# Patient Record
Sex: FEMALE | Race: WHITE | Employment: OTHER | URBAN - METROPOLITAN AREA
[De-identification: names, ages, dates, MRNs, and addresses within clinical notes are randomized per-mention and may not be internally consistent; named-entity substitution may affect disease eponyms.]

---

## 2019-05-09 ENCOUNTER — HOSPITAL ENCOUNTER (OUTPATIENT)
Age: 84
Discharge: HOME | End: 2019-05-11
Payer: COMMERCIAL

## 2019-05-09 LAB
ALBUMIN SERPL-MCNC: 4.2 G/DL (ref 3.5–5.2)
ALP BLD-CCNC: 136 U/L (ref 35–104)
ALT SERPL-CCNC: 20 U/L (ref 0–32)
ANION GAP SERPL CALCULATED.3IONS-SCNC: 18 MMOL/L (ref 7–16)
AST SERPL-CCNC: 20 U/L (ref 0–31)
BACTERIA: ABNORMAL /HPF
BASOPHILS ABSOLUTE: 0.06 E9/L (ref 0–0.2)
BASOPHILS RELATIVE PERCENT: 0.7 % (ref 0–2)
BILIRUB SERPL-MCNC: 0.5 MG/DL (ref 0–1.2)
BILIRUBIN URINE: NEGATIVE
BLOOD, URINE: NEGATIVE
BUN BLDV-MCNC: 28 MG/DL (ref 8–23)
CALCIUM SERPL-MCNC: 10.3 MG/DL (ref 8.6–10.2)
CHLORIDE BLD-SCNC: 107 MMOL/L (ref 98–107)
CHOLESTEROL, TOTAL: 168 MG/DL (ref 0–199)
CLARITY: CLEAR
CO2: 18 MMOL/L (ref 22–29)
COLOR: YELLOW
CREAT SERPL-MCNC: 1.4 MG/DL (ref 0.5–1)
EOSINOPHILS ABSOLUTE: 0.29 E9/L (ref 0.05–0.5)
EOSINOPHILS RELATIVE PERCENT: 3.5 % (ref 0–6)
EPITHELIAL CELLS, UA: ABNORMAL /HPF
FOLATE: 19.9 NG/ML (ref 4.8–24.2)
GFR AFRICAN AMERICAN: 43
GFR NON-AFRICAN AMERICAN: 36 ML/MIN/1.73
GLUCOSE BLD-MCNC: 261 MG/DL (ref 74–99)
GLUCOSE URINE: NEGATIVE MG/DL
HBA1C MFR BLD: 8.2 % (ref 4–5.6)
HCT VFR BLD CALC: 34.2 % (ref 34–48)
HDLC SERPL-MCNC: 37 MG/DL
HEMOGLOBIN: 11.4 G/DL (ref 11.5–15.5)
IMMATURE GRANULOCYTES #: 0.06 E9/L
IMMATURE GRANULOCYTES %: 0.7 % (ref 0–5)
KETONES, URINE: NEGATIVE MG/DL
LDL CHOLESTEROL CALCULATED: 86 MG/DL (ref 0–99)
LEUKOCYTE ESTERASE, URINE: ABNORMAL
LYMPHOCYTES ABSOLUTE: 1.54 E9/L (ref 1.5–4)
LYMPHOCYTES RELATIVE PERCENT: 18.4 % (ref 20–42)
MCH RBC QN AUTO: 29.9 PG (ref 26–35)
MCHC RBC AUTO-ENTMCNC: 33.3 % (ref 32–34.5)
MCV RBC AUTO: 89.8 FL (ref 80–99.9)
MONOCYTES ABSOLUTE: 0.67 E9/L (ref 0.1–0.95)
MONOCYTES RELATIVE PERCENT: 8 % (ref 2–12)
NEUTROPHILS ABSOLUTE: 5.73 E9/L (ref 1.8–7.3)
NEUTROPHILS RELATIVE PERCENT: 68.7 % (ref 43–80)
NITRITE, URINE: NEGATIVE
PDW BLD-RTO: 14.2 FL (ref 11.5–15)
PH UA: 5 (ref 5–9)
PLATELET # BLD: 226 E9/L (ref 130–450)
PMV BLD AUTO: 9.9 FL (ref 7–12)
POTASSIUM SERPL-SCNC: 5 MMOL/L (ref 3.5–5)
PROTEIN UA: NEGATIVE MG/DL
RBC # BLD: 3.81 E12/L (ref 3.5–5.5)
RBC UA: ABNORMAL /HPF (ref 0–2)
SODIUM BLD-SCNC: 143 MMOL/L (ref 132–146)
SPECIFIC GRAVITY UA: 1.01 (ref 1–1.03)
TOTAL PROTEIN: 7.7 G/DL (ref 6.4–8.3)
TRIGL SERPL-MCNC: 223 MG/DL (ref 0–149)
TSH SERPL DL<=0.05 MIU/L-ACNC: 6.28 UIU/ML (ref 0.27–4.2)
UROBILINOGEN, URINE: 0.2 E.U./DL
VITAMIN B-12: 650 PG/ML (ref 211–946)
VITAMIN D 25-HYDROXY: 50 NG/ML (ref 30–100)
VLDLC SERPL CALC-MCNC: 45 MG/DL
WBC # BLD: 8.4 E9/L (ref 4.5–11.5)
WBC UA: ABNORMAL /HPF (ref 0–5)

## 2019-05-09 PROCEDURE — 80053 COMPREHEN METABOLIC PANEL: CPT

## 2019-05-09 PROCEDURE — 81001 URINALYSIS AUTO W/SCOPE: CPT

## 2019-05-09 PROCEDURE — 82607 VITAMIN B-12: CPT

## 2019-05-09 PROCEDURE — 84443 ASSAY THYROID STIM HORMONE: CPT

## 2019-05-09 PROCEDURE — 85025 COMPLETE CBC W/AUTO DIFF WBC: CPT

## 2019-05-09 PROCEDURE — 82306 VITAMIN D 25 HYDROXY: CPT

## 2019-05-09 PROCEDURE — 80061 LIPID PANEL: CPT

## 2019-05-09 PROCEDURE — 82746 ASSAY OF FOLIC ACID SERUM: CPT

## 2019-05-09 PROCEDURE — 83036 HEMOGLOBIN GLYCOSYLATED A1C: CPT

## 2020-08-23 ENCOUNTER — HOSPITAL ENCOUNTER (INPATIENT)
Age: 85
LOS: 4 days | Discharge: NURSING, REHAB OR LONG-TERM CARE | DRG: 552 | End: 2020-08-27
Attending: EMERGENCY MEDICINE | Admitting: SURGERY
Payer: COMMERCIAL

## 2020-08-23 ENCOUNTER — HOSPITAL ENCOUNTER (EMERGENCY)
Age: 85
Discharge: ANOTHER HOSPITAL | End: 2020-08-23
Attending: EMERGENCY MEDICINE
Payer: COMMERCIAL

## 2020-08-23 ENCOUNTER — APPOINTMENT (OUTPATIENT)
Dept: GENERAL RADIOLOGY | Age: 85
DRG: 552 | End: 2020-08-23
Payer: COMMERCIAL

## 2020-08-23 ENCOUNTER — APPOINTMENT (OUTPATIENT)
Dept: CT IMAGING | Age: 85
DRG: 552 | End: 2020-08-23
Payer: COMMERCIAL

## 2020-08-23 ENCOUNTER — APPOINTMENT (OUTPATIENT)
Dept: CT IMAGING | Age: 85
End: 2020-08-23
Payer: COMMERCIAL

## 2020-08-23 ENCOUNTER — APPOINTMENT (OUTPATIENT)
Dept: GENERAL RADIOLOGY | Age: 85
End: 2020-08-23
Payer: COMMERCIAL

## 2020-08-23 VITALS
SYSTOLIC BLOOD PRESSURE: 156 MMHG | WEIGHT: 150 LBS | TEMPERATURE: 98.8 F | RESPIRATION RATE: 18 BRPM | BODY MASS INDEX: 24.11 KG/M2 | DIASTOLIC BLOOD PRESSURE: 82 MMHG | HEIGHT: 66 IN | OXYGEN SATURATION: 96 % | HEART RATE: 69 BPM
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
## 2020-08-23 PROBLEM — K76.89 LIVER CYST: Status: ACTIVE | Noted: 2020-08-23

## 2020-08-23 PROBLEM — R91.1 PULMONARY NODULE, RIGHT: Status: ACTIVE | Noted: 2020-08-23

## 2020-08-23 PROBLEM — S22.43XA CLOSED FRACTURE OF MULTIPLE RIBS OF BOTH SIDES: Status: ACTIVE | Noted: 2020-08-23

## 2020-08-23 LAB
ANION GAP SERPL CALCULATED.3IONS-SCNC: 15 MMOL/L (ref 7–16)
BASOPHILS ABSOLUTE: 0.06 E9/L (ref 0–0.2)
BASOPHILS RELATIVE PERCENT: 0.6 % (ref 0–2)
BUN BLDV-MCNC: 31 MG/DL (ref 8–23)
CALCIUM SERPL-MCNC: 10.1 MG/DL (ref 8.6–10.2)
CHLORIDE BLD-SCNC: 93 MMOL/L (ref 98–107)
CO2: 20 MMOL/L (ref 22–29)
CREAT SERPL-MCNC: 1.4 MG/DL (ref 0.5–1)
EOSINOPHILS ABSOLUTE: 0.2 E9/L (ref 0.05–0.5)
EOSINOPHILS RELATIVE PERCENT: 1.9 % (ref 0–6)
GFR AFRICAN AMERICAN: 43
GFR NON-AFRICAN AMERICAN: 36 ML/MIN/1.73
GLUCOSE BLD-MCNC: 180 MG/DL (ref 74–99)
HCT VFR BLD CALC: 38.2 % (ref 34–48)
HEMOGLOBIN: 13 G/DL (ref 11.5–15.5)
IMMATURE GRANULOCYTES #: 0.11 E9/L
IMMATURE GRANULOCYTES %: 1 % (ref 0–5)
LACTIC ACID: 1.4 MMOL/L (ref 0.5–2.2)
LYMPHOCYTES ABSOLUTE: 0.9 E9/L (ref 1.5–4)
LYMPHOCYTES RELATIVE PERCENT: 8.4 % (ref 20–42)
MCH RBC QN AUTO: 29.4 PG (ref 26–35)
MCHC RBC AUTO-ENTMCNC: 34 % (ref 32–34.5)
MCV RBC AUTO: 86.4 FL (ref 80–99.9)
METER GLUCOSE: 196 MG/DL (ref 74–99)
MONOCYTES ABSOLUTE: 0.7 E9/L (ref 0.1–0.95)
MONOCYTES RELATIVE PERCENT: 6.5 % (ref 2–12)
NEUTROPHILS ABSOLUTE: 8.74 E9/L (ref 1.8–7.3)
NEUTROPHILS RELATIVE PERCENT: 81.6 % (ref 43–80)
PDW BLD-RTO: 14.3 FL (ref 11.5–15)
PLATELET # BLD: 221 E9/L (ref 130–450)
PMV BLD AUTO: 8.6 FL (ref 7–12)
POTASSIUM SERPL-SCNC: 4 MMOL/L (ref 3.5–5)
RBC # BLD: 4.42 E12/L (ref 3.5–5.5)
SODIUM BLD-SCNC: 128 MMOL/L (ref 132–146)
WBC # BLD: 10.7 E9/L (ref 4.5–11.5)

## 2020-08-23 PROCEDURE — 83605 ASSAY OF LACTIC ACID: CPT

## 2020-08-23 PROCEDURE — 6360000002 HC RX W HCPCS: Performed by: STUDENT IN AN ORGANIZED HEALTH CARE EDUCATION/TRAINING PROGRAM

## 2020-08-23 PROCEDURE — 2500000003 HC RX 250 WO HCPCS: Performed by: STUDENT IN AN ORGANIZED HEALTH CARE EDUCATION/TRAINING PROGRAM

## 2020-08-23 PROCEDURE — 72128 CT CHEST SPINE W/O DYE: CPT

## 2020-08-23 PROCEDURE — 70450 CT HEAD/BRAIN W/O DYE: CPT

## 2020-08-23 PROCEDURE — 99285 EMERGENCY DEPT VISIT HI MDM: CPT

## 2020-08-23 PROCEDURE — 71250 CT THORAX DX C-: CPT

## 2020-08-23 PROCEDURE — 6360000002 HC RX W HCPCS: Performed by: EMERGENCY MEDICINE

## 2020-08-23 PROCEDURE — 2060000000 HC ICU INTERMEDIATE R&B

## 2020-08-23 PROCEDURE — 6370000000 HC RX 637 (ALT 250 FOR IP): Performed by: EMERGENCY MEDICINE

## 2020-08-23 PROCEDURE — 99223 1ST HOSP IP/OBS HIGH 75: CPT | Performed by: SURGERY

## 2020-08-23 PROCEDURE — 73030 X-RAY EXAM OF SHOULDER: CPT

## 2020-08-23 PROCEDURE — 71045 X-RAY EXAM CHEST 1 VIEW: CPT

## 2020-08-23 PROCEDURE — 72170 X-RAY EXAM OF PELVIS: CPT

## 2020-08-23 PROCEDURE — 85025 COMPLETE CBC W/AUTO DIFF WBC: CPT

## 2020-08-23 PROCEDURE — 72125 CT NECK SPINE W/O DYE: CPT

## 2020-08-23 PROCEDURE — 6370000000 HC RX 637 (ALT 250 FOR IP): Performed by: STUDENT IN AN ORGANIZED HEALTH CARE EDUCATION/TRAINING PROGRAM

## 2020-08-23 PROCEDURE — 96376 TX/PRO/DX INJ SAME DRUG ADON: CPT

## 2020-08-23 PROCEDURE — 72131 CT LUMBAR SPINE W/O DYE: CPT

## 2020-08-23 PROCEDURE — 6370000000 HC RX 637 (ALT 250 FOR IP): Performed by: SURGERY

## 2020-08-23 PROCEDURE — 80048 BASIC METABOLIC PNL TOTAL CA: CPT

## 2020-08-23 PROCEDURE — 96374 THER/PROPH/DIAG INJ IV PUSH: CPT

## 2020-08-23 PROCEDURE — 82962 GLUCOSE BLOOD TEST: CPT

## 2020-08-23 PROCEDURE — 2580000003 HC RX 258: Performed by: STUDENT IN AN ORGANIZED HEALTH CARE EDUCATION/TRAINING PROGRAM

## 2020-08-23 RX ORDER — SODIUM CHLORIDE 0.9 % (FLUSH) 0.9 %
10 SYRINGE (ML) INJECTION EVERY 12 HOURS SCHEDULED
Status: DISCONTINUED | OUTPATIENT
Start: 2020-08-23 | End: 2020-08-27 | Stop reason: HOSPADM

## 2020-08-23 RX ORDER — OXYCODONE HYDROCHLORIDE 5 MG/1
5 TABLET ORAL EVERY 4 HOURS PRN
Status: DISCONTINUED | OUTPATIENT
Start: 2020-08-23 | End: 2020-08-27 | Stop reason: HOSPADM

## 2020-08-23 RX ORDER — SODIUM CHLORIDE, SODIUM LACTATE, POTASSIUM CHLORIDE, CALCIUM CHLORIDE 600; 310; 30; 20 MG/100ML; MG/100ML; MG/100ML; MG/100ML
INJECTION, SOLUTION INTRAVENOUS CONTINUOUS
Status: DISCONTINUED | OUTPATIENT
Start: 2020-08-23 | End: 2020-08-26

## 2020-08-23 RX ORDER — METHOCARBAMOL 500 MG/1
750 TABLET, FILM COATED ORAL ONCE
Status: COMPLETED | OUTPATIENT
Start: 2020-08-23 | End: 2020-08-23

## 2020-08-23 RX ORDER — METHOCARBAMOL 750 MG/1
750 TABLET, FILM COATED ORAL 3 TIMES DAILY
Status: DISCONTINUED | OUTPATIENT
Start: 2020-08-23 | End: 2020-08-27 | Stop reason: HOSPADM

## 2020-08-23 RX ORDER — SODIUM CHLORIDE 0.9 % (FLUSH) 0.9 %
10 SYRINGE (ML) INJECTION PRN
Status: DISCONTINUED | OUTPATIENT
Start: 2020-08-23 | End: 2020-08-27 | Stop reason: HOSPADM

## 2020-08-23 RX ORDER — ACETAMINOPHEN 325 MG/1
650 TABLET ORAL EVERY 4 HOURS
Status: DISCONTINUED | OUTPATIENT
Start: 2020-08-23 | End: 2020-08-23

## 2020-08-23 RX ORDER — OXYCODONE HYDROCHLORIDE AND ACETAMINOPHEN 5; 325 MG/1; MG/1
2 TABLET ORAL EVERY 4 HOURS PRN
Status: DISCONTINUED | OUTPATIENT
Start: 2020-08-23 | End: 2020-08-23

## 2020-08-23 RX ORDER — BISACODYL 10 MG
10 SUPPOSITORY, RECTAL RECTAL DAILY PRN
Status: DISCONTINUED | OUTPATIENT
Start: 2020-08-23 | End: 2020-08-27 | Stop reason: HOSPADM

## 2020-08-23 RX ORDER — OXYCODONE HYDROCHLORIDE AND ACETAMINOPHEN 5; 325 MG/1; MG/1
1 TABLET ORAL EVERY 4 HOURS PRN
Status: DISCONTINUED | OUTPATIENT
Start: 2020-08-23 | End: 2020-08-23

## 2020-08-23 RX ORDER — OXYBUTYNIN CHLORIDE 5 MG/1
5 TABLET ORAL NIGHTLY
Status: DISCONTINUED | OUTPATIENT
Start: 2020-08-23 | End: 2020-08-27 | Stop reason: HOSPADM

## 2020-08-23 RX ORDER — FENTANYL CITRATE 50 UG/ML
25 INJECTION, SOLUTION INTRAMUSCULAR; INTRAVENOUS ONCE
Status: COMPLETED | OUTPATIENT
Start: 2020-08-23 | End: 2020-08-23

## 2020-08-23 RX ORDER — ACETAMINOPHEN 325 MG/1
650 TABLET ORAL EVERY 4 HOURS PRN
Status: DISCONTINUED | OUTPATIENT
Start: 2020-08-23 | End: 2020-08-23

## 2020-08-23 RX ORDER — OXYCODONE HYDROCHLORIDE 5 MG/1
10 TABLET ORAL EVERY 4 HOURS PRN
Status: DISCONTINUED | OUTPATIENT
Start: 2020-08-23 | End: 2020-08-26

## 2020-08-23 RX ORDER — LIDOCAINE 4 G/G
2 PATCH TOPICAL DAILY
Status: DISCONTINUED | OUTPATIENT
Start: 2020-08-23 | End: 2020-08-27 | Stop reason: HOSPADM

## 2020-08-23 RX ORDER — INSULIN GLARGINE 100 [IU]/ML
20 INJECTION, SOLUTION SUBCUTANEOUS NIGHTLY
Status: DISCONTINUED | OUTPATIENT
Start: 2020-08-23 | End: 2020-08-27 | Stop reason: HOSPADM

## 2020-08-23 RX ORDER — HEPARIN SODIUM 10000 [USP'U]/ML
5000 INJECTION, SOLUTION INTRAVENOUS; SUBCUTANEOUS EVERY 8 HOURS SCHEDULED
Status: DISCONTINUED | OUTPATIENT
Start: 2020-08-23 | End: 2020-08-27 | Stop reason: HOSPADM

## 2020-08-23 RX ORDER — ACETAMINOPHEN 325 MG/1
650 TABLET ORAL EVERY 6 HOURS PRN
Status: DISCONTINUED | OUTPATIENT
Start: 2020-08-23 | End: 2020-08-27 | Stop reason: HOSPADM

## 2020-08-23 RX ORDER — METHOCARBAMOL 500 MG/1
1000 TABLET, FILM COATED ORAL 3 TIMES DAILY
Status: DISCONTINUED | OUTPATIENT
Start: 2020-08-23 | End: 2020-08-23

## 2020-08-23 RX ORDER — ACETAMINOPHEN 500 MG
1000 TABLET ORAL ONCE
Status: COMPLETED | OUTPATIENT
Start: 2020-08-23 | End: 2020-08-23

## 2020-08-23 RX ORDER — ASPIRIN 81 MG/1
81 TABLET, CHEWABLE ORAL DAILY
Status: DISCONTINUED | OUTPATIENT
Start: 2020-08-23 | End: 2020-08-27 | Stop reason: HOSPADM

## 2020-08-23 RX ORDER — SPIRONOLACTONE 25 MG/1
50 TABLET ORAL DAILY
Status: DISCONTINUED | OUTPATIENT
Start: 2020-08-23 | End: 2020-08-23

## 2020-08-23 RX ORDER — CITALOPRAM 20 MG/1
20 TABLET ORAL DAILY
Status: DISCONTINUED | OUTPATIENT
Start: 2020-08-23 | End: 2020-08-27 | Stop reason: HOSPADM

## 2020-08-23 RX ORDER — ACETAMINOPHEN 325 MG/1
650 TABLET ORAL EVERY 6 HOURS PRN
COMMUNITY

## 2020-08-23 RX ORDER — PROMETHAZINE HYDROCHLORIDE 25 MG/1
12.5 TABLET ORAL EVERY 6 HOURS PRN
Status: DISCONTINUED | OUTPATIENT
Start: 2020-08-23 | End: 2020-08-23

## 2020-08-23 RX ORDER — GLIMEPIRIDE 4 MG/1
8 TABLET ORAL
Status: DISCONTINUED | OUTPATIENT
Start: 2020-08-24 | End: 2020-08-23

## 2020-08-23 RX ORDER — ONDANSETRON 2 MG/ML
4 INJECTION INTRAMUSCULAR; INTRAVENOUS EVERY 6 HOURS PRN
Status: DISCONTINUED | OUTPATIENT
Start: 2020-08-23 | End: 2020-08-27 | Stop reason: HOSPADM

## 2020-08-23 RX ORDER — FENTANYL CITRATE 50 UG/ML
25 INJECTION, SOLUTION INTRAMUSCULAR; INTRAVENOUS
Status: DISCONTINUED | OUTPATIENT
Start: 2020-08-23 | End: 2020-08-23

## 2020-08-23 RX ORDER — SENNA AND DOCUSATE SODIUM 50; 8.6 MG/1; MG/1
1 TABLET, FILM COATED ORAL 2 TIMES DAILY
Status: DISCONTINUED | OUTPATIENT
Start: 2020-08-23 | End: 2020-08-27 | Stop reason: HOSPADM

## 2020-08-23 RX ORDER — FENTANYL CITRATE 50 UG/ML
50 INJECTION, SOLUTION INTRAMUSCULAR; INTRAVENOUS ONCE
Status: COMPLETED | OUTPATIENT
Start: 2020-08-23 | End: 2020-08-23

## 2020-08-23 RX ADMIN — CITALOPRAM 20 MG: 20 TABLET, FILM COATED ORAL at 20:59

## 2020-08-23 RX ADMIN — INSULIN LISPRO 1 UNITS: 100 INJECTION, SOLUTION INTRAVENOUS; SUBCUTANEOUS at 21:24

## 2020-08-23 RX ADMIN — METHOCARBAMOL TABLETS 750 MG: 750 TABLET, COATED ORAL at 20:59

## 2020-08-23 RX ADMIN — INSULIN GLARGINE 20 UNITS: 100 INJECTION, SOLUTION SUBCUTANEOUS at 21:24

## 2020-08-23 RX ADMIN — ASPIRIN 81 MG CHEWABLE TABLET 81 MG: 81 TABLET CHEWABLE at 20:58

## 2020-08-23 RX ADMIN — DOCUSATE SODIUM 50 MG AND SENNOSIDES 8.6 MG 1 TABLET: 8.6; 5 TABLET, FILM COATED ORAL at 20:58

## 2020-08-23 RX ADMIN — FENTANYL CITRATE 25 MCG: 50 INJECTION, SOLUTION INTRAMUSCULAR; INTRAVENOUS at 14:16

## 2020-08-23 RX ADMIN — Medication 10 ML: at 21:08

## 2020-08-23 RX ADMIN — FENTANYL CITRATE 50 MCG: 50 INJECTION, SOLUTION INTRAMUSCULAR; INTRAVENOUS at 11:24

## 2020-08-23 RX ADMIN — FAMOTIDINE 20 MG: 10 INJECTION, SOLUTION INTRAVENOUS at 20:58

## 2020-08-23 RX ADMIN — HEPARIN SODIUM 5000 UNITS: 10000 INJECTION INTRAVENOUS; SUBCUTANEOUS at 20:57

## 2020-08-23 RX ADMIN — OXYCODONE 5 MG: 5 TABLET ORAL at 21:24

## 2020-08-23 RX ADMIN — ACETAMINOPHEN 1000 MG: 500 TABLET, FILM COATED ORAL at 09:11

## 2020-08-23 RX ADMIN — OXYBUTYNIN CHLORIDE 5 MG: 5 TABLET ORAL at 20:59

## 2020-08-23 RX ADMIN — FENTANYL CITRATE 25 MCG: 50 INJECTION, SOLUTION INTRAMUSCULAR; INTRAVENOUS at 15:38

## 2020-08-23 RX ADMIN — SODIUM CHLORIDE, POTASSIUM CHLORIDE, SODIUM LACTATE AND CALCIUM CHLORIDE: 600; 310; 30; 20 INJECTION, SOLUTION INTRAVENOUS at 20:47

## 2020-08-23 RX ADMIN — METHOCARBAMOL TABLETS 750 MG: 500 TABLET, COATED ORAL at 15:38

## 2020-08-23 ASSESSMENT — PAIN SCALES - GENERAL
PAINLEVEL_OUTOF10: 8
PAINLEVEL_OUTOF10: 7
PAINLEVEL_OUTOF10: 7
PAINLEVEL_OUTOF10: 5
PAINLEVEL_OUTOF10: 5
PAINLEVEL_OUTOF10: 7

## 2020-08-23 ASSESSMENT — ENCOUNTER SYMPTOMS
CHEST TIGHTNESS: 0
NAUSEA: 0
SHORTNESS OF BREATH: 0
COLOR CHANGE: 0
EYE REDNESS: 0
COUGH: 0
FACIAL SWELLING: 0
EYE PAIN: 0
ABDOMINAL DISTENTION: 0
DIARRHEA: 0
RHINORRHEA: 0
PHOTOPHOBIA: 0
BACK PAIN: 1
CONSTIPATION: 0

## 2020-08-23 ASSESSMENT — PAIN DESCRIPTION - FREQUENCY
FREQUENCY: CONTINUOUS
FREQUENCY: CONTINUOUS

## 2020-08-23 ASSESSMENT — PAIN DESCRIPTION - LOCATION
LOCATION: NECK
LOCATION: RIB CAGE
LOCATION: SHOULDER
LOCATION: GENERALIZED

## 2020-08-23 ASSESSMENT — PAIN DESCRIPTION - ORIENTATION
ORIENTATION: RIGHT
ORIENTATION: RIGHT

## 2020-08-23 ASSESSMENT — PAIN SCALES - WONG BAKER
WONGBAKER_NUMERICALRESPONSE: 6
WONGBAKER_NUMERICALRESPONSE: 6

## 2020-08-23 ASSESSMENT — PAIN DESCRIPTION - DESCRIPTORS
DESCRIPTORS: ACHING
DESCRIPTORS: STABBING

## 2020-08-23 ASSESSMENT — PAIN DESCRIPTION - PAIN TYPE
TYPE: ACUTE PAIN

## 2020-08-23 NOTE — ED NOTES
Continue with cervical collar, alert, moves all ext. Ems here to transport patient.       Patricia Lott RN  08/23/20 [id number]

## 2020-08-23 NOTE — ED PROVIDER NOTES
Department of Emergency Medicine   ED  Provider Note  Admit Date/RoomTime: 8/23/2020  1:28 PM  ED Room: 22/22          History of Present Illness:  8/23/20, Time: 2:34 PM EDT  Chief Complaint   Patient presents with    Trauma     Transfer from [address] [place] [address]. Fall a couple days ago. C2 fx and multiple rib fx. Cristo Carlson is a 81 y.o. female presenting to the ED for trauma consultation, beginning several days. The complaint has been persistent, moderate in severity, and worsened by any movement and deep breathing. .  Patient presents as a trauma transfer. She was seen at [facility], she sustained a fall a few days ago, was seen initially at her [facility] discharged home. At [facility] she had CT scans which revealed a c2 fracture as well as multiple rib fractures. She was transferred here for trauma consultation. She states she still in pain but denies numbness tingling weakness. Denies nausea vomiting loss of consciousness. Review of Systems:   Pertinent positives and negatives are stated within HPI, all other systems reviewed and are negative.        --------------------------------------------- PAST HISTORY ---------------------------------------------  Past Medical History:  has a past medical history of CAD (coronary artery disease), COPD (chronic obstructive pulmonary disease) ([facility] Diabetes mellitus ([facility] Hyperlipidemia, and Hypertension. Past Surgical History:  has a past surgical history that includes Cardiac surgery; Hysterectomy; Coronary artery bypass graft (april 2013); Achilles tendon surgery (Left); Colonoscopy; eye surgery (Right); and other surgical history (Right, 7 7 14). Social History:  reports that she quit smoking about 7 years ago. She quit after 30.00 years of use. She has never used smokeless tobacco. She reports that she does not drink alcohol or use drugs. Family History: family history is not on file. . Unless otherwise noted, family history is non contributory    The patients home medications have been reviewed. Allergies: Naproxen; Nsaids; and Vicodin [hydrocodone-acetaminophen]        ---------------------------------------------------PHYSICAL EXAM--------------------------------------    Constitutional/General: Alert and oriented x3  Head: Normocephalic and atraumatic  Eyes: PERRL, EOMI, sclera non icteric  Mouth: Oropharynx clear, handling secretions, no trismus, no asymmetry of the posterior oropharynx or uvular edema  Neck: Immobilized in cervical collar  Respiratory: Diminished throughout,Not in respiratory distress  Cardiovascular:  Regular rate. Regular rhythm. 2+ distal pulses. Equal extremity pulses. Chest: There is right anterior as well as mild left chest wall pain. There is no crepitance or deformity  GI:  Abdomen Soft, Non tender, Non distended. No rebound, guarding, or rigidity. Musculoskeletal: Moves all extremities x 4. Warm and well perfused, no clubbing, cyanosis, or edema. Capillary refill <3 seconds  Integument: skin warm and dry. No rashes. Neurologic: GCS 15, no focal deficits, symmetric strength 5/5 in the upper and lower extremities bilaterally  Psychiatric: Normal Affect         -------------------------------------------------- RESULTS -------------------------------------------------  I have personally reviewed all laboratory and imaging results for this patient. Results are listed below.      LABS: (Lab results interpreted by me)  No results found for this visit on 08/23/20.,       RADIOLOGY:  Interpreted by Radiologist unless otherwise specified  No orders to display                    ------------------------- NURSING NOTES AND VITALS REVIEWED ---------------------------   The nursing notes within the ED encounter and vital signs as below have been reviewed by myself  /72   Pulse 67   Temp 98.8 °F (37.1 °C)   Resp 18   SpO2 95%     Oxygen Saturation Interpretation: Normal         The patients available past medical records and past encounters were reviewed. ------------------------------ ED COURSE/MEDICAL DECISION MAKING----------------------  Medications   fentaNYL (SUBLIMAZE) injection 25 mcg (25 mcg Intravenous Given 8/23/20 1416)                    Medical Decision Making:     IDr. Radha in the primary provider of record    Patient will be transitioned from EMS collar to [place] collar. SMI was ordered, pain medications ordered. Trauma service was notified arrival of the patient      Re-Evaluations:          Re-evaluation. Patients symptoms show no change  Repeat physical examination is not changed        This patient's ED course included: a personal history and physicial examination, re-evaluation prior to disposition, IV medications, cardiac monitoring, continuous pulse oximetry and complex medical decision making and emergency management    This patient has remained hemodynamically stable during their ED course. Consultations:  Trauma       Critical Care:         Counseling: The emergency provider has spoken with the patient and discussed todays results, in addition to providing specific details for the plan of care and counseling regarding the diagnosis and prognosis. Questions are answered at this time and they are agreeable with the plan.       --------------------------------- IMPRESSION AND DISPOSITION ---------------------------------    IMPRESSION  1. Closed nondisplaced fracture of second cervical vertebra, unspecified fracture morphology, initial encounter ([facility]    2. Closed fracture of multiple ribs of both sides, initial encounter        DISPOSITION  Disposition: Admit per consultation  Patient condition is stable        NOTE: This report was transcribed using voice recognition software.  Every effort was made to ensure accuracy; however, inadvertent computerized transcription errors may be present       Varun Amanda DO  08/23/20 1517

## 2020-08-23 NOTE — ED PROVIDER NOTES
up to 1.1 cm. Recommendations described below. 3. Multivessel coronary artery disease. Stable dilation of the ascending   thoracic aorta measuring up to 4.4 cm, previously 4.3 cm.   4. Hypodensity posterior to the cervical esophagus measuring up to 2.2 cm. Finding could represent a Zenker's diverticulum. 5. Large cyst centered within the right hepatic lobe now demonstrates   layering hyperdensity which could be related to proteinaceous versus   hemorrhagic content. RECOMMENDATIONS:   11 mm solid suspicious pulmonary nodule within the upper lobe. Consider a   non-contrast Chest CT at 3 months, a PET/CT, or tissue sampling. These guidelines do not apply to immunocompromised patients and patients with   cancer. Follow up in patients with significant comorbidities as clinically   warranted. For lung cancer screening, adhere to Lung-RADS guidelines. Reference: Radiology. 2017; 284(1):228-43. XR SHOULDER RIGHT (MIN 2 VIEWS)   Final Result   No acute fractures or dislocations of right shoulder. ------------------------- NURSING NOTES AND VITALS REVIEWED ---------------------------  Date / Time Roomed:  8/23/2020  8:22 AM  ED Bed Assignment:  JANE/JANE    The nursing notes within the ED encounter and vital signs as below have been reviewed. No data found. Oxygen Saturation Interpretation: Normal      ------------------------------------------ PROGRESS NOTES ------------------------------------------  Re-evaluation(s):  Time: 0930. Patients symptoms are worsening  Repeat physical examination is not changed    Time: 1030. Patients symptoms are improving  Repeat physical examination is improved    I have spoken with the patient and discussed todays results, in addition to providing specific details for the plan of care and counseling regarding the diagnosis and prognosis. Their questions are answered at this time and they are agreeable with the plan.   I have discussed the clinical information. I have reviewed my findings and recommendations with Jin Farfan and members of family present at the time of disposition. I, Dr. Silvio Tompkins am the primary physician of record for this patient. MDM: The patient is 81 y.o. female  with a past medical history of       Diagnosis Date    CAD (coronary artery disease)     COPD (chronic obstructive pulmonary disease) ([facility]     early stage    Diabetes mellitus ([facility]     Hyperlipidemia     Hypertension      presenting to the emergency department with a chief complaint of fall. The patient did have imaging which revealed cervical spine fracture as well as multiple rib fractures. Patient transferred to [facility] for trauma services. Critical care:  Critical Care: Please note that the withdrawal or failure to initiate urgent interventions for this patient would likely result in a life threatening deterioration or permanent disability. Accordingly this patient received 30 minutes of critical care time, excluding separately billable procedures. My findings/plan: The primary encounter diagnosis was Other closed nondisplaced fracture of second cervical vertebra, initial encounter ([facility] A diagnosis of Closed fracture of multiple ribs of right side, initial encounter was also pertinent to this visit.   Discharge Medication List as of 8/23/2020  1:09 PM        Clarence Meehan, [address], DO  08/24/20 220 Daniel Landeros, DO  09/13/20 [id number]

## 2020-08-23 NOTE — ED NOTES
Bed: 10  Expected date:   Expected time:   Means of arrival:   Comments:  ubaldo Regan RN  08/23/20 [id number]

## 2020-08-23 NOTE — H&P
TRAUMA HISTORY & PHYSICAL  Resident   8/23/2020  4:56 PM    Chief Complaint   Patient presents with    Trauma     Transfer from [address] [place] [address]. Fall a couple days ago. C2 fx and multiple rib fx. HPI: Fletcher Esquivel is a 81 y.o. female with a past medical history of COPD not on home oxygen, coronary artery disease, diabetes, hyper lipidemia, hypertension, and a past surgical history of a CABG x3 in 2013 and a laparoscopic hysterectomy with a 30-pack-year smoking history presents after a fall 2 days ago. She admits to hitting her head and has a large knot on the back of her head. Patient denies any blood thinners. Patient denies loss of consciousness. No nausea or vomiting, change in vision, changes in hearing, changes in motor function or sensory function since the incident. Main complaint is neck, chest wall, and back pain.       PRIMARY SURVEY    AIRWAY:   Airway normal  EMS ETT Absent   Noisy respirations Absent   Retractions: Absent   Vomiting/bleeding: Absent     BREATHING:    Midaxillary breath sound left:  Present  Midaxillary breath sound right: Present  Cough sound intensity:  Poor  Respiratory rate: 16  FiO2: RA   SpO2: 97  SMI: 1000    CIRCULATION:   Femerol pulse rate: within normal limits  Femerol pulse intensity: present  Palpebral conjunctiva: Pink     BP      P     SpO2       T      F    Patient Vitals for the past 8 hrs:   BP Temp Pulse Resp SpO2   08/23/20 1614 122/78 -- 67 16 97 %   08/23/20 1551 (!) 140/57 98.7 °F (37.1 °C) 72 18 97 %   08/23/20 1331 137/72 98.8 °F (37.1 °C) 67 18 95 %       FAST EXAM: Not performed    Central Nervous System    GCS Initial 15 minutes   Eye  Motor  Verbal 4 - Opens eyes on own  6 - Follows simple motor commands  5 - Alert and oriented 4 - Opens eyes on own  6 - Follows simple motor commands  5 - Alert and oriented     Neuromuscular blockade: No  Pupil size:  Left 3 mm    Right 3 mm  Pupil reaction: Yes  Wiggles fingers: Left Yes Right Yes  Wiggles toes: Substance Use Topics    Alcohol use: No       Past Surgical History:   has a past surgical history that includes Cardiac surgery; Hysterectomy; Coronary artery bypass graft (april 2013); Achilles tendon surgery (Left); Colonoscopy; eye surgery (Right); and other surgical history (Right, 7 7 14). NSAID use in last 72 hours: no  Taken PCN in past:  no  Last food/drink:   Last tetanus: unknown    Complaints:     Head: mild  Neck: moderate  Chest: moderate  Back: moderate  Abdomen: none  Extremities: mild  Comments:       SECONDARY SURVEY  Head/scalp: Large bruise on back of the right scalp    Face: No soft tissue injuries    Eyes/ears/nose: EOMI, PEERL, no soft tissue injuries    Pharynx/mouth:  No soft tissue injury, no malocclusion    Neck: No soft tissue injuries   Cervical spine tenderness: moderate  ROM:  Cervical collar in place    Chest wall:  CTAB, no crepitus appreciated, no soft tissue injuries     Heart:  RRR    Abdomen: Soft, non-distended, no soft tissue injuries   Tenderness:  none    Pelvis: Stable, no soft tissue injuries, no pain with hip flexion   Tenderness: none    Thoracolumbar spine: No soft tissue injuries, no step-offs  Tenderness:  moderate    Genitourinary:  no traumatic injuries    Rectum: no traumatic injuries    Perineum: no traumatic injuries    Extremities:   Sensory normal  Motor normal    Distal Pulses  Left arm Normal  Right arm Normal  Left leg Normal  Right leg Normal    Capillary refill   Left arm normal  Right arm normal  Left leg normal   Right leg normal      Procedures in ED:  None      Radiology:     Xr Pelvis (1-2 Views)    Result Date: 8/23/2020  Single view of the pelvis. There is diffuse bone demineralization. Degenerative spondylosis and facet arthropathy are identified in the lower lumbar spine. Osteophytosis and eburnation of sacroiliac joints and hips. Catheter overlies the lower pelvis. Prior internal fixation of proximal femur fracture bilaterally.  Otherwise the regional soft tissue and osseous structures are unremarkable. No acute fracture is identified. Osteopenia. Degenerative disc disease. Prior internal fixation of proximal femur fracture bilaterally. Xr Shoulder Right (min 2 Views)    Result Date: 8/23/2020  EXAMINATION: THREE XRAY VIEWS OF THE RIGHT SHOULDER 8/23/2020 9:26 am COMPARISON: None. HISTORY: ORDERING SYSTEM PROVIDED HISTORY: fall TECHNOLOGIST PROVIDED HISTORY: Reason for exam:->fall FINDINGS: Glenohumeral joint is normally aligned. No evidence of acute fracture or dislocation. No abnormal periarticular calcifications. Moderate osteoarthrosis the AC joint. Visualized lung is unremarkable. No acute fractures or dislocations of right shoulder. Ct Head Wo Contrast    Result Date: 8/23/2020  EXAMINATION: CT OF THE HEAD WITHOUT CONTRAST  8/23/2020 9:23 am TECHNIQUE: CT of the head was performed without the administration of intravenous contrast. Dose modulation, iterative reconstruction, and/or weight based adjustment of the mA/kV was utilized to reduce the radiation dose to as low as reasonably achievable. COMPARISON: CT head January 16, 2015 HISTORY: ORDERING SYSTEM PROVIDED HISTORY: fall TECHNOLOGIST PROVIDED HISTORY: Reason for exam:->fall Has a \"code stroke\" or \"stroke alert\" been called? ->No FINDINGS: BRAIN/VENTRICLES: There is no acute intracranial hemorrhage, mass effect or midline shift. No abnormal extra-axial fluid collection. The gray-white differentiation is maintained without evidence of an acute infarct. There is no evidence of hydrocephalus. ORBITS: The visualized portion of the orbits demonstrate no acute abnormality. SINUSES: Again seen is the right sphenoid sinus inspissated mucus. The visualized paranasal sinuses and mastoid air cells demonstrate no acute abnormality. SOFT TISSUES/SKULL:  No acute abnormality of the visualized skull or soft tissues. No acute intracranial abnormality.  Right sphenoid sinus is aerated mucous unchanged. Ct Chest Wo Contrast    Result Date: 8/23/2020  EXAMINATION: CT OF THE CHEST WITHOUT CONTRAST 8/23/2020 9:23 am TECHNIQUE: CT of the chest was performed without the administration of intravenous contrast. Multiplanar reformatted images are provided for review. Dose modulation, iterative reconstruction, and/or weight based adjustment of the mA/kV was utilized to reduce the radiation dose to as low as reasonably achievable. COMPARISON: CT chest performed January 16, 2015 HISTORY: ORDERING SYSTEM PROVIDED HISTORY: Fall TECHNOLOGIST PROVIDED HISTORY: Reason for exam:->Fall FINDINGS: Mediastinum: Multiple grossly stable thyroid gland nodules, not significantly changed from prior examination. Follow-up as clinically indicated. Hypodensity along the posterior left lateral aspect of the cervical esophagus measuring up to 2.2 cm. No significant mediastinal or hilar lymphadenopathy. Multivessel coronary artery disease. Grossly stable dilation of the ascending thoracic aorta measuring up to 4.4 cm in diameter, previously 4.3 cm. Diffuse atherosclerotic disease of the visualized aorta. Lungs/pleura: New spiculated nodule within the right upper lobe (axial image 20) measures 1.1 x 0.8 cm. Bibasilar atelectasis. No pleural effusion or pneumothorax. The central airways are grossly patent. Upper Abdomen: Large cyst centered within the right hepatic lobe now demonstrates some dependently layering hyperdensity which could represent proteinaceous versus hemorrhagic content. The adrenal glands are normal. Soft Tissues/Bones: Diffuse osteopenia. Multilevel degenerative changes of the visualized spine. Mildly displaced fractures of the right lateral 3rd, 4th, and 5th ribs. Mildly displaced fracture of the posterolateral left 9th rib. .     1. Mildly displaced right 3rd-5th rib and left 9th rib fractures. 2. The spiculated nodule in the right upper lobe measuring up to 1.1 cm.  Recommendations described below. 3. Multivessel coronary artery disease. Stable dilation of the ascending thoracic aorta measuring up to 4.4 cm, previously 4.3 cm. 4. Hypodensity posterior to the cervical esophagus measuring up to 2.2 cm. Finding could represent a Zenker's diverticulum. 5. Large cyst centered within the right hepatic lobe now demonstrates layering hyperdensity which could be related to proteinaceous versus hemorrhagic content. RECOMMENDATIONS: 11 mm solid suspicious pulmonary nodule within the upper lobe. Consider a non-contrast Chest CT at 3 months, a PET/CT, or tissue sampling. These guidelines do not apply to immunocompromised patients and patients with cancer. Follow up in patients with significant comorbidities as clinically warranted. For lung cancer screening, adhere to Lung-RADS guidelines. Reference: Radiology. 2017; 284(1):228-43. Ct Cervical Spine Wo Contrast    Result Date: 8/23/2020  EXAMINATION: CT OF THE CERVICAL SPINE WITHOUT CONTRAST 8/23/2020 9:23 am TECHNIQUE: CT of the cervical spine was performed without the administration of intravenous contrast. Multiplanar reformatted images are provided for review. Dose modulation, iterative reconstruction, and/or weight based adjustment of the mA/kV was utilized to reduce the radiation dose to as low as reasonably achievable. COMPARISON: None. HISTORY: ORDERING SYSTEM PROVIDED HISTORY: fall TECHNOLOGIST PROVIDED HISTORY: Reason for exam:->fall FINDINGS: BONES/ALIGNMENT: There is a nondisplaced fracture involving the left superior articular facet, and appears to extend into the inferior articular facet on the left. DEGENERATIVE CHANGES: Multilevel degenerative changes throughout the cervical spine. SOFT TISSUES: There is no prevertebral soft tissue swelling. There is a 9 mm spiculated lesion in the right lung apex. Nondisplaced fracture left articular facet of C2. No compression deformities.      Xr Chest Portable    Result Date: 8/23/2020  Clinical indications: Rib fractures. TECHNIQUE: Single frontal projection of the chest (1 view). COMPARISON: January 16, 2015. FINDINGS: Prior median sternotomy wires. There is calcification within thoracic aorta. Acromioclavicular arthropathy. The heart, lungs, mediastinum and regional skeleton are otherwise unremarkable. No evidence for acute cardiopulmonary process. Consultations: Neurosurgery consult    Admission/Diagnosis:   81 y.o. female s/p fall 2 days ago. Patient found to have C2 facet fracture and right 3 through 5, left ninth rib fractures. Trauma will admit. Further labs and scans ongoing. Code Status: Full    Plan of Treatment:      Follow-up labs and scans  Admit to trauma service  Neurosurgery consult    Plan discussed with Dr. Jean Alvarez.     Kiana Romero on 8/23/2020 at 4:56 PM

## 2020-08-24 LAB
ANION GAP SERPL CALCULATED.3IONS-SCNC: 15 MMOL/L (ref 7–16)
BASOPHILS ABSOLUTE: 0.04 E9/L (ref 0–0.2)
BASOPHILS RELATIVE PERCENT: 0.5 % (ref 0–2)
BUN BLDV-MCNC: 30 MG/DL (ref 8–23)
CALCIUM SERPL-MCNC: 10.2 MG/DL (ref 8.6–10.2)
CHLORIDE BLD-SCNC: 96 MMOL/L (ref 98–107)
CO2: 20 MMOL/L (ref 22–29)
CREAT SERPL-MCNC: 1.3 MG/DL (ref 0.5–1)
EOSINOPHILS ABSOLUTE: 0.19 E9/L (ref 0.05–0.5)
EOSINOPHILS RELATIVE PERCENT: 2.2 % (ref 0–6)
GFR AFRICAN AMERICAN: 47
GFR NON-AFRICAN AMERICAN: 39 ML/MIN/1.73
GLUCOSE BLD-MCNC: 185 MG/DL (ref 74–99)
HCT VFR BLD CALC: 38.7 % (ref 34–48)
HEMOGLOBIN: 13 G/DL (ref 11.5–15.5)
IMMATURE GRANULOCYTES #: 0.07 E9/L
IMMATURE GRANULOCYTES %: 0.8 % (ref 0–5)
LYMPHOCYTES ABSOLUTE: 1.42 E9/L (ref 1.5–4)
LYMPHOCYTES RELATIVE PERCENT: 16.2 % (ref 20–42)
MCH RBC QN AUTO: 29.4 PG (ref 26–35)
MCHC RBC AUTO-ENTMCNC: 33.6 % (ref 32–34.5)
MCV RBC AUTO: 87.6 FL (ref 80–99.9)
METER GLUCOSE: 186 MG/DL (ref 74–99)
METER GLUCOSE: 198 MG/DL (ref 74–99)
METER GLUCOSE: 199 MG/DL (ref 74–99)
METER GLUCOSE: 230 MG/DL (ref 74–99)
MONOCYTES ABSOLUTE: 0.71 E9/L (ref 0.1–0.95)
MONOCYTES RELATIVE PERCENT: 8.1 % (ref 2–12)
NEUTROPHILS ABSOLUTE: 6.34 E9/L (ref 1.8–7.3)
NEUTROPHILS RELATIVE PERCENT: 72.2 % (ref 43–80)
PDW BLD-RTO: 14.3 FL (ref 11.5–15)
PLATELET # BLD: 214 E9/L (ref 130–450)
PMV BLD AUTO: 8.8 FL (ref 7–12)
POTASSIUM REFLEX MAGNESIUM: 4.6 MMOL/L (ref 3.5–5)
RBC # BLD: 4.42 E12/L (ref 3.5–5.5)
SODIUM BLD-SCNC: 131 MMOL/L (ref 132–146)
WBC # BLD: 8.8 E9/L (ref 4.5–11.5)

## 2020-08-24 PROCEDURE — 2580000003 HC RX 258: Performed by: STUDENT IN AN ORGANIZED HEALTH CARE EDUCATION/TRAINING PROGRAM

## 2020-08-24 PROCEDURE — 85025 COMPLETE CBC W/AUTO DIFF WBC: CPT

## 2020-08-24 PROCEDURE — 97166 OT EVAL MOD COMPLEX 45 MIN: CPT

## 2020-08-24 PROCEDURE — 97162 PT EVAL MOD COMPLEX 30 MIN: CPT

## 2020-08-24 PROCEDURE — 6370000000 HC RX 637 (ALT 250 FOR IP): Performed by: STUDENT IN AN ORGANIZED HEALTH CARE EDUCATION/TRAINING PROGRAM

## 2020-08-24 PROCEDURE — 82962 GLUCOSE BLOOD TEST: CPT

## 2020-08-24 PROCEDURE — 97530 THERAPEUTIC ACTIVITIES: CPT

## 2020-08-24 PROCEDURE — 6370000000 HC RX 637 (ALT 250 FOR IP): Performed by: SURGERY

## 2020-08-24 PROCEDURE — 36415 COLL VENOUS BLD VENIPUNCTURE: CPT

## 2020-08-24 PROCEDURE — 80048 BASIC METABOLIC PNL TOTAL CA: CPT

## 2020-08-24 PROCEDURE — 99232 SBSQ HOSP IP/OBS MODERATE 35: CPT | Performed by: SURGERY

## 2020-08-24 PROCEDURE — 2060000000 HC ICU INTERMEDIATE R&B

## 2020-08-24 PROCEDURE — 6360000002 HC RX W HCPCS: Performed by: STUDENT IN AN ORGANIZED HEALTH CARE EDUCATION/TRAINING PROGRAM

## 2020-08-24 RX ADMIN — ONDANSETRON 4 MG: 2 INJECTION INTRAMUSCULAR; INTRAVENOUS at 02:02

## 2020-08-24 RX ADMIN — METHOCARBAMOL TABLETS 750 MG: 750 TABLET, COATED ORAL at 14:58

## 2020-08-24 RX ADMIN — ASPIRIN 81 MG CHEWABLE TABLET 81 MG: 81 TABLET CHEWABLE at 08:37

## 2020-08-24 RX ADMIN — INSULIN LISPRO 2 UNITS: 100 INJECTION, SOLUTION INTRAVENOUS; SUBCUTANEOUS at 08:38

## 2020-08-24 RX ADMIN — DOCUSATE SODIUM 50 MG AND SENNOSIDES 8.6 MG 1 TABLET: 8.6; 5 TABLET, FILM COATED ORAL at 08:36

## 2020-08-24 RX ADMIN — ONDANSETRON 4 MG: 2 INJECTION INTRAMUSCULAR; INTRAVENOUS at 15:08

## 2020-08-24 RX ADMIN — OXYBUTYNIN CHLORIDE 5 MG: 5 TABLET ORAL at 20:24

## 2020-08-24 RX ADMIN — SODIUM CHLORIDE, POTASSIUM CHLORIDE, SODIUM LACTATE AND CALCIUM CHLORIDE: 600; 310; 30; 20 INJECTION, SOLUTION INTRAVENOUS at 15:13

## 2020-08-24 RX ADMIN — INSULIN LISPRO 4 UNITS: 100 INJECTION, SOLUTION INTRAVENOUS; SUBCUTANEOUS at 17:39

## 2020-08-24 RX ADMIN — HEPARIN SODIUM 5000 UNITS: 10000 INJECTION INTRAVENOUS; SUBCUTANEOUS at 14:57

## 2020-08-24 RX ADMIN — OXYCODONE 5 MG: 5 TABLET ORAL at 04:43

## 2020-08-24 RX ADMIN — CITALOPRAM 20 MG: 20 TABLET, FILM COATED ORAL at 08:36

## 2020-08-24 RX ADMIN — METHOCARBAMOL TABLETS 750 MG: 750 TABLET, COATED ORAL at 08:36

## 2020-08-24 RX ADMIN — METFORMIN HYDROCHLORIDE 1000 MG: 1000 TABLET ORAL at 17:39

## 2020-08-24 RX ADMIN — INSULIN LISPRO 1 UNITS: 100 INJECTION, SOLUTION INTRAVENOUS; SUBCUTANEOUS at 20:24

## 2020-08-24 RX ADMIN — METFORMIN HYDROCHLORIDE 1000 MG: 1000 TABLET ORAL at 12:21

## 2020-08-24 RX ADMIN — OXYCODONE 10 MG: 5 TABLET ORAL at 10:56

## 2020-08-24 RX ADMIN — HEPARIN SODIUM 5000 UNITS: 10000 INJECTION INTRAVENOUS; SUBCUTANEOUS at 22:57

## 2020-08-24 RX ADMIN — INSULIN LISPRO 2 UNITS: 100 INJECTION, SOLUTION INTRAVENOUS; SUBCUTANEOUS at 12:21

## 2020-08-24 RX ADMIN — HEPARIN SODIUM 5000 UNITS: 10000 INJECTION INTRAVENOUS; SUBCUTANEOUS at 05:36

## 2020-08-24 RX ADMIN — METHOCARBAMOL TABLETS 750 MG: 750 TABLET, COATED ORAL at 20:24

## 2020-08-24 RX ADMIN — INSULIN GLARGINE 20 UNITS: 100 INJECTION, SOLUTION SUBCUTANEOUS at 20:24

## 2020-08-24 ASSESSMENT — PAIN DESCRIPTION - DESCRIPTORS
DESCRIPTORS: ACHING;DISCOMFORT;SORE
DESCRIPTORS: ACHING;CONSTANT;DISCOMFORT

## 2020-08-24 ASSESSMENT — PAIN SCALES - GENERAL
PAINLEVEL_OUTOF10: 2
PAINLEVEL_OUTOF10: 6
PAINLEVEL_OUTOF10: 0
PAINLEVEL_OUTOF10: 0
PAINLEVEL_OUTOF10: 7
PAINLEVEL_OUTOF10: 3
PAINLEVEL_OUTOF10: 0

## 2020-08-24 ASSESSMENT — PAIN DESCRIPTION - LOCATION
LOCATION: GENERALIZED
LOCATION: SHOULDER

## 2020-08-24 ASSESSMENT — PAIN DESCRIPTION - PAIN TYPE
TYPE: ACUTE PAIN

## 2020-08-24 ASSESSMENT — PAIN SCALES - WONG BAKER: WONGBAKER_NUMERICALRESPONSE: 0

## 2020-08-24 ASSESSMENT — PAIN DESCRIPTION - FREQUENCY: FREQUENCY: INTERMITTENT

## 2020-08-24 NOTE — PROGRESS NOTES
Physical Therapy  Physical Therapy Initial Assessment   Name: Zandra Mcburney  : 1935  MRN: [id number]    Referring Provider:  Kain Meraz MD    Date of Service: 2020    Evaluating PT:  Marwhit Netta, PT, DPT [id number]    Room #:  7121/2569-D  Diagnosis:  Fall from standing, initial encounter  PMHx/PSHx:  DM, CAD, COPD, HTN, HLD  Precautions:  Falls, cervical precautions for C2 fx, aspen collar  Equipment Needs:  TBD     SUBJECTIVE:    Pt lives at [facility]. Pt ambulated with rollator PTA. Pt reports she receives assistance with ADLs as needed. Pt reports 4 falls at [facility] recently. She reports multiple falls in her past as well. OBJECTIVE:   Initial Evaluation  Date: 2020 Treatment Short Term/ Long Term   Goals   AM-PAC 6 Clicks      Was pt agreeable to Eval/treatment? yes     Does pt have pain? 8/10 beck and shoulder pain     Bed Mobility  Rolling: Ana  Supine to sit: Ana  Sit to supine: NT  Scooting: Ana  Rolling: Independent  Supine to sit: Independent  Sit to supine: Independent  Scooting: Independent   Transfers Sit to stand: Ana  Stand to sit: Ana  Stand pivot: modA front [address]  Sit to stand: SBA  Stand to sit: SBA  Stand pivot: SBA front [address]   Ambulation    NT, pt declined  25 feet with front [address] [employer] negotiation: ascended and descended  NT  NA   ROM BUE:  Per OT note  BLE:  WNL except L ankle 5 degrees of DF with firm end feel     Strength BUE:  Per OT note  BLE:  WNL except L ankle 2+ df     Balance Sitting EOB:  SBA  Dynamic Standing:  modA front [address]  Sitting EOB:  Independent  Dynamic Standing:  Ana front [address]     Pt is A & O x 3 (self, time, situation, thought she was in [facility])  Sensation:  Pt denies numbness and tingling to extremities  Edema:  unremarkable    Patient education  Pt educated on role of PT intervention. Pt educated on safety in room with utilization of call light for assistance with mobility.   Pt educated on importance of aspen collar and all other cervical precautions. Lengthy discussion regarding need for further rehabilitation and assistance with walking in the future for safety and fall prevention. Patient response to education:   Pt verbalized understanding Pt demonstrated skill Pt requires further education in this area   yes yes yes     ASSESSMENT:    Comments:  RN cleared pt for activity prior to session. Pt received supine in bed and agreeable to PT intervention with OT collaboration at this time. Pt performed all functional mobility as noted above. Pt very fearful of falling and reporting many falls in her past with 4 happening within the past 2 months. Pt declined ambulation and states she does not want to walk anymore to avoid falling. Lengthy discussion regarding the importance of continued mobility with the appropriate level of assistance and safety precautions taken. At end of session, pt transferred to bedside chair and left with all needs met and call light in reach. Pt requires continued skilled PT intervention for the purposes of maximizing functional mobility and independence. Treatment:  Patient practiced and was instructed in the following treatment:     Therapeutic Activities Completed:  o Functional mobility as noted above:   - Bed mobility: Ana to complete. Max VC for log roll and cervical precautions. - Transfer training: Ana STS and modA stand pivot with front [employer]. Max VC for proper hand placement, sequencing, and front [employer] management.    o Skilled repositioning in seated position for comfort.  o Pt education as noted above. Pt's/ family goals   1. Not stated     Patient and or family understand(s) diagnosis, prognosis, and plan of care. yes    PLAN:    PT care will be provided in accordance with the objectives noted above. Exercises and functional mobility practice will be used as well as appropriate assistive devices or modalities to obtain goals.  Patient and family education will also be administered as needed. Frequency of treatments: 2-5x/week x 1-2 weeks. Time in  1145  Time out  1215    Total Treatment Time  25 minutes     Evaluation Time includes thorough review of current medical information, gathering information on past medical history/social history and prior level of function, completion of standardized testing/informal observation of tasks, assessment of data and education on plan of care and goals.     CPT codes:  [] Low Complexity PT evaluation 36045  [x] Moderate Complexity PT evaluation 58342  [] High Complexity PT evaluation 23093  [] PT Re-evaluation 93079  [] Gait training 39911 0 minutes  [] Manual therapy 73540 0 minutes  [x] Therapeutic activities 46286 25 minutes  [] Therapeutic exercises 40568 0 minutes  [] Neuromuscular reeducation 70112 0 minutes     Shelby Donato, PT, DPT  [id number]

## 2020-08-24 NOTE — PROGRESS NOTES
OCCUPATIONAL THERAPY INITIAL EVALUATION      Date:2020  Patient Name: Estrella Hutton  MRN: [id number]  : 1935  Room: [address] [place] [address]    Referring Provider: Duarte Cordero MD    Evaluating OT: Louie Pitts OTR/L #[id number]    AM-PAC Daily Activity Raw Score:     Recommended Adaptive Equipment: To be further assessed      Diagnosis:    1. Closed nondisplaced fracture of second cervical vertebra, unspecified fracture morphology, initial encounter ([address]    2. Closed fracture of multiple ribs of both sides, initial encounter          Pertinent Medical History: hx of L shoulder fx, COPD, CAD, DM,      Precautions:  Falls, cervical precautions for C2 fx, aspen collar     Home Living: Pt lives at an [facility]. Prior Level of Function: mostly indep  with ADLs, assist with baths.  , assist  with IADLs; using ww for ambulation. Pt reported she has a recent hx of multiple falls at [facility]    Pain Level: 8/10 back and shoulder pain   Cognition: A&O: 3/4; Follows 2 step directions   Memory:  fair    Sequencing:  fair    Problem solving:  fair    Judgement/safety:  fair      Functional Assessment:   Initial Eval Status  Date: 20 Treatment Status  Date: STG/LTG  Frequency/duration  2-3x/week, 5-7 days   Feeding Stand by Assist   Independent    Grooming Minimal Assist   Modified Divide    UB Dressing Moderate Assist   Supervision    LB Dressing Maximal Assist   Minimal Assist    Bathing Maximal Assist  Minimal Assist    Toileting Maximal Assist   Minimal Assist    Bed Mobility  Supine to sit: Minimal Assist   Sit to supine: NT   Supine to sit: Modified [place]ide   Sit to supine: Modified [place]    Functional Transfers Sit to stand: Minimal Assist   Stand to sit: Minimal Assist   Stand pivot: Moderate Assist with ww  Stand by Assist    Functional Mobility  (Ambulation) Mod A with ww 2-3 small side steps bed to chair  Pt reported fear of falling forward during mobility.    SBA with ww  Short functional distance   Balance Sitting:     Static:  SBA    Dynamic:min A  Standing: mod A     Activity Tolerance Poor for OOB activity  Good for OOB ADLs   Visual/  Perceptual Glasses: yes          BUE  ROM/Strength/  Fine motor Coordination RUE: ROM grossly WFL     Strength: grossly 3+/5      Strength:  WFL     Coordination: WFL    LUE: ROM minimal shoulder, partial elbow ROM     Strength: NT      Strength: fair     Coordination: poor+  Increase LUE AROM to partial ROM for improved indep with ADLs     Hand dominance: Left    Hearing: WFL  Sensation:  No c/o numbness or tingling  Tone:  WFL  Edema: None noted                          Treatment: OT treatment provided this date includes:    Mobility-  Instruction/training on safety and improved independence with bed mobility/functional transfers  and functional mobility.   Sitting EOB x 15 minutes to improve dynamic sitting balance and activity tolerance during ADLs.          Comments: Upon arrival, patient in bed. Pt education on cervical spinal precautions prior mobility. .  At end of session, patient up in chair, with call light and phone within reach, all lines and tubes intact. Pt demonstrating fair+ understanding of education/techniques. Patient would benefit from continued skilled OT  to improve safety, functional independence and quality of life.     Assessment of current deficits   Functional mobility [x]  ADLs [x] Strength [x]  Cognition []  Functional transfers  [x] IADLs [] Safety Awareness [x]  Endurance [x]  Fine Motor Coordination [x] Balance [x] Vision/perception [] Sensation []   Gross Motor Coordination [x] ROM [x] Delirium []                  Motor Control []    Plan of Care:   ADL retraining [x]    Equipment needs [x]   Neuromuscular re-education [x]  Energy Conservation Techniques [x]  Functional Transfer training [x]  Patient and/or Family Education [x]  Functional Mobility training [x]              Environmental Modifications [x]  Cognitive

## 2020-08-24 NOTE — PROGRESS NOTES
TRAUMA SURGERY  ATTENDING PROGRESS NOTE      CC: fall     S:   feeling ok, some neck pain,     O:   @/63   Pulse 72   Temp 97.5 °F (36.4 °C) (Temporal)   Resp 18   Ht 5' 6\" (1.676 m)   Wt 150 lb (68 kg)   SpO2 93%   BMI 24.21 kg/m² @    Gen - no apparent distress   Neuro - Awake, alert, attentive    HEENT - PERRL 3mm, posterior cephalohematoma   Lungs - non labored, BS clear + chest wall tenderness    Heart - RR   Abdomen - snt   Spine -   c spine tenderness   Ext- nvi rom wnl     A/P: 80 yo female s/p mechanical fall with C2 fx and b/l rib fx       Active Problems:    Fall from standing    Closed nondisplaced fracture of second cervical vertebra (HCC)    right 3rd-5th rib and left 9th rib fractures. Pulmonary nodule, right- Incidental     Liver cyst- Incidental   Resolved Problems:    * No resolved hospital problems. *      - C2 fx c collar, NS following, given low velocity mechanism elevated Cr, will forgo screening cta neck.    - Rib fx smi deep breathing pain control   - Home meds, DM SSI, restart metformin, carb cont diet    DVT prophylaxis: SCD, heparin tid     Radha Coffey MD FACS

## 2020-08-24 NOTE — DISCHARGE SUMMARY
Physician Discharge Summary     Patient ID:  Zaida Fang  [id number]  45 y.o.  1935    Admit date: 8/23/2020    Discharge date and time: 8/27/2020  3:01 PM     Admitting Physician: Duke Jeronimo MD     Admission Diagnoses: Fall from standing, initial encounter [W19. XXXA]    Discharge Diagnoses: Active Problems:    Fall from standing    Closed nondisplaced fracture of second cervical vertebra (HCC)    right 3rd-5th rib and left 9th rib fractures. Pulmonary nodule, right- Incidental     Liver cyst- Incidental   Resolved Problems:    * No resolved hospital problems. *      Admission Condition: fair    Discharged Condition: stable    Indication for Admission: Fall from standing height. C2 fracture. Bilateral rib fractures. Hospital Course/Procedures/Operation/treatments:     8/23: Fall from standing height. Found to have C2 fracture. CTA neck with held due to creatinine of 1.4 and low GFR of 28. Bilateral rib fractures SMI around 1000. Admitted to the general floor. 8/24: No new findings on tertiary exam.  [facility] around 850-1000. We will follow-up creatinine and see if she needs CTA this morning.  8/25: NAEO. Eating okay. Passing gas and stool. -600. MRI deferred d/t possible mechanical heart valve. Need to contact daughter about this. Plan is for NADER on DC. 13/24 with PT yesterday. 8/26: Remove rock, dc IV pain meds, obtain MRI  C spine  8/27: NAEO. Very sleeping this morning so not responding to questions. 16/24 with PT yesterday. Awaiting [facility] recs regarding results of cervical MRI. NADER on DC. Consults:   IP CONSULT TO TRAUMA SURGERY  IP CONSULT TO SOCIAL WORK  IP CONSULT TO NEUROSURGERY    Significant Diagnostic Studies:   Xr Pelvis (1-2 Views)    Result Date: 8/23/2020  Single view of the pelvis. There is diffuse bone demineralization. Degenerative spondylosis and facet arthropathy are identified in the lower lumbar spine.  Osteophytosis and eburnation of sacroiliac joints and hips. Catheter overlies the lower pelvis. Prior internal fixation of proximal femur fracture bilaterally. Otherwise the regional soft tissue and osseous structures are unremarkable. No acute fracture is identified. Osteopenia. Degenerative disc disease. Prior internal fixation of proximal femur fracture bilaterally. Xr Shoulder Right (min 2 Views)    Result Date: 8/23/2020  EXAMINATION: THREE XRAY VIEWS OF THE RIGHT SHOULDER 8/23/2020 9:26 am COMPARISON: None. HISTORY: ORDERING SYSTEM PROVIDED HISTORY: fall TECHNOLOGIST PROVIDED HISTORY: Reason for exam:->fall FINDINGS: Glenohumeral joint is normally aligned. No evidence of acute fracture or dislocation. No abnormal periarticular calcifications. Moderate osteoarthrosis the AC joint. Visualized lung is unremarkable. No acute fractures or dislocations of right shoulder. Ct Head Wo Contrast    Result Date: 8/23/2020  EXAMINATION: CT OF THE HEAD WITHOUT CONTRAST  8/23/2020 9:23 am TECHNIQUE: CT of the head was performed without the administration of intravenous contrast. Dose modulation, iterative reconstruction, and/or weight based adjustment of the mA/kV was utilized to reduce the radiation dose to as low as reasonably achievable. COMPARISON: CT head January 16, 2015 HISTORY: ORDERING SYSTEM PROVIDED HISTORY: fall TECHNOLOGIST PROVIDED HISTORY: Reason for exam:->fall Has a \"code stroke\" or \"stroke alert\" been called? ->No FINDINGS: BRAIN/VENTRICLES: There is no acute intracranial hemorrhage, mass effect or midline shift. No abnormal extra-axial fluid collection. The gray-white differentiation is maintained without evidence of an acute infarct. There is no evidence of hydrocephalus. ORBITS: The visualized portion of the orbits demonstrate no acute abnormality. SINUSES: Again seen is the right sphenoid sinus inspissated mucus. The visualized paranasal sinuses and mastoid air cells demonstrate no acute abnormality.  SOFT TISSUES/SKULL:  No acute abnormality of the visualized skull or soft tissues. No acute intracranial abnormality. Right sphenoid sinus is aerated mucous unchanged. Ct Chest Wo Contrast    Result Date: 8/23/2020  EXAMINATION: CT OF THE CHEST WITHOUT CONTRAST 8/23/2020 9:23 am TECHNIQUE: CT of the chest was performed without the administration of intravenous contrast. Multiplanar reformatted images are provided for review. Dose modulation, iterative reconstruction, and/or weight based adjustment of the mA/kV was utilized to reduce the radiation dose to as low as reasonably achievable. COMPARISON: CT chest performed January 16, 2015 HISTORY: ORDERING SYSTEM PROVIDED HISTORY: Fall TECHNOLOGIST PROVIDED HISTORY: Reason for exam:->Fall FINDINGS: Mediastinum: Multiple grossly stable thyroid gland nodules, not significantly changed from prior examination. Follow-up as clinically indicated. Hypodensity along the posterior left lateral aspect of the cervical esophagus measuring up to 2.2 cm. No significant mediastinal or hilar lymphadenopathy. Multivessel coronary artery disease. Grossly stable dilation of the ascending thoracic aorta measuring up to 4.4 cm in diameter, previously 4.3 cm. Diffuse atherosclerotic disease of the visualized aorta. Lungs/pleura: New spiculated nodule within the right upper lobe (axial image 20) measures 1.1 x 0.8 cm. Bibasilar atelectasis. No pleural effusion or pneumothorax. The central airways are grossly patent. Upper Abdomen: Large cyst centered within the right hepatic lobe now demonstrates some dependently layering hyperdensity which could represent proteinaceous versus hemorrhagic content. The adrenal glands are normal. Soft Tissues/Bones: Diffuse osteopenia. Multilevel degenerative changes of the visualized spine. Mildly displaced fractures of the right lateral 3rd, 4th, and 5th ribs. Mildly displaced fracture of the posterolateral left 9th rib. .     1. Mildly displaced right 3rd-5th rib fracture left articular facet of C2. No compression deformities. Ct Thoracic Spine Wo Contrast    Result Date: 2020  Patient Mrn: [id number] : 1935 Age:  81 years Gender: Female Order Date: 2020 6:20 PM Exam: CT THORACIC SPINE WO CONTRAST NUMBER OF IMAGES:  908 INDICATION: Trauma, advanced age COMPARISON: Thoracic CT images 529 TECHNIQUE: Helical images were extended through the thoracic spine without contrast, and reformatted at bone window and soft tissue window settings in all 3 imaging planes. Low-dose CT  acquisition technique included one of following options; 1 . Automated exposure control, 2. Adjustment of MA and or KV according to patient's size or 3. Use of iterative reconstruction. Findings: Thoracic vertebrae show diffuse demineralization, and flowing ventral osteophytes suggests a spectrum of DISH-type change. There is a kyphotic curvature with the apex at about the T7 level. The central spinal canal and foramina appear patent, and the posterior elements appear intact throughout. Coronal images show no lateral deviation of the thoracic spine. Axial images show atherosclerotic vertebral arterial calcifications, but the medial ribs and thoracic vertebrae appear intact throughout. Standard CT imaging included more the anterior and lateral chest wall, and reported fractures of the right third through fifth and left ninth ribs. There is ectasia and intimal calcification of the thoracic aorta, and coronary artery calcifications are present. Lung window images show a posteromedial right pulmonary apical irregularly marginated nodular density of 13 mm diameter which is not further characterized on this study. It could be evaluated by PET CT imaging, short-term interval 3 month chest CT follow-up, or less desirable in this age range, percutaneous biopsy. There are small pleural effusions with basilar atelectasis.      There is no evidence of chest wall trauma, particularly, no evidence of trauma involving the thoracic spine. Bilateral rib fractures documented on standard CT imaging were not included in the spinal images. There is a 13 mm diameter mass in the posterior medial right pulmonary apical region, which could be further characterized with PET CT imaging if it has not been previously evaluated. 3 month CT chest follow-up evaluation may document stability in this patient of advanced age, it PET CT imaging is not feasible. Dependent atelectasis and pleural thickening in both lungs is noted. Ct Lumbar Spine Wo Contrast    Result Date: 8/23/2020  This examination and all examinations utilizing ionizing radiation at this facility are done so according to the ALARA (as low as reasonably achievable) principal for radiation dose reduction. COMPARISON: None. TECHNIQUE: Axial, sagittal, and coronal computed tomography of the lumbar spine was performed without contrast. FINDINGS: There is no evidence of acute displaced cortical disruption or spondylolisthesis. The vertebral bodies and disks are normal in vertical dimension and alignment is maintained. There is diffuse degenerative spondylosis and facet arthropathy. Circumferential disc bulge, ligamentum flavum hypertrophy and degenerative facet arthropathy result in central spinal canal stenosis at L2-3, L3-4 and L4-5. Posterior lateral disc and osteophyte complex and degenerative facet arthropathy result in foraminal stenosis on the right at L3-4 and L4-5, on the left at L4-5 and L5-S1. Calcification and ectasia of the abdominal aorta and its branches. Fusiform aneurysmal dilation of the abdominal aorta measures 3.6 cm in the infrarenal distribution. Otherwise the regional soft tissue and osseous structures are unremarkable. No acute fracture or spondylolisthesis is identified on CT of lumbar spine. Diffuse degenerative disc and facet disease result in multilevel central and foraminal stenosis.  3.6 cm infrarenal abdominal aortic aneurysm. Xr Chest Portable    Result Date: 8/23/2020  Clinical indications: Rib fractures. TECHNIQUE: Single frontal projection of the chest (1 view). COMPARISON: January 16, 2015. FINDINGS: Prior median sternotomy wires. There is calcification within thoracic aorta. Acromioclavicular arthropathy. The heart, lungs, mediastinum and regional skeleton are otherwise unremarkable. No evidence for acute cardiopulmonary process. Discharge Exam:  TRAUMA SURGERY  ATTENDING PROGRESS NOTE        CC: fall      S:  No issues pain is controlled      O:          @/66   Pulse 75   Temp 97.6 °F (36.4 °C) (Temporal)   Resp 16   Ht 5' 6\" (1.676 m)   Wt 150 lb (68 kg)   SpO2 94%   BMI 24.21 kg/m² @     Gen - no apparent distress   Neuro - Awake, alert, attentive    HEENT - PERRL 3mm,   Lungs - non labored, BS clear + chest wall tenderness    Heart - RR   Abdomen - snt   Spine -   c spine tenderness mild/moderate    Ext- nvi rom wnl      A/P: 82 yo female s/p mechanical fall with C2 fx and b/l rib fx        Active Problems:    Fall from standing    Closed nondisplaced fracture of second cervical vertebra (HCC)    right 3rd-5th rib and left 9th rib fractures. Pulmonary nodule, right- Incidental     Liver cyst- Incidental   Resolved Problems:    * No resolved hospital problems. *       - C2 fx c collar, NS following, given low velocity mechanism elevated Cr, will forgo screening cta neck. Soft collar in bed and aspen when out of bed per NS    - Rib fx smi deep breathing pain control   - Home meds, DM SSI,metformin, carb cont diet  - PTOT placement      DVT prophylaxis: SCD, heparin tid      Abimael Kraus MD FACS                      Disposition: SNF    In process/preliminary results:  Outstanding Order Results     No orders found from 7/25/2020 to 8/24/2020.           Patient Instructions:   Discharge Medication List as of 8/27/2020 12:10 PM           Details   aspirin 81 MG chewable tablet Take 1 tablet by care provider. Follow up with Dr. Karan Luis as directed. ACTIVITY INSTRUCTIONS  Increase activity as tolerated  No heavy lifting or strenuous activity  Take your incentive spirometer home and use 4-6 times/day      WOUND/DRESSING INSTRUCTIONS:  You may shower. No sitting in bath tub, hot tub or swimming until cleared by physician. Ice to areas of pain for first 24 hours. Heat to areas of pain after that. Wash areas of lacerations/abrasions with soap & water. Rinse well. Pat dry with clean towel. Apply thin layer of Bacitracin, Neosporin, or triple antibiotic cream to affected area 2-3 times per day. Keep wounds clean and dry. MEDICATION INSTRUCTIONS  Take medication as prescribed. When taking pain medications, you may experience dizziness or drowsiness. Do not drink alcohol or drive when taking these medications. You may experience constipation while taking pain medication. You may take over the counter stool softeners such as docusate (Colace), sennosides S (Senokot-S), or Miralax. OPIOID MEDICATION INSTRUCTIONS  Read the medication guide that is included with your prescription. Take your medication exactly as prescribed. Store medication away from children and in a safe place. Do NOT share your medication with others. Do NOT take medication unless it is prescribed for you. Do NOT drink alcohol while taking opioids (I.e., Norco, Percocet, Oxycodone, etc). Discuss with the Trauma Clinic staff if the dose of medication you are taking does not control your pain and any side effects that you may be having. CALL 911 OR YOUR LOCAL EMERGENCY SERVICE:  --If you take too much medication  --If you have trouble breathing or shortness of breath  --A child has taken this medication. WORK:  You may not return to work until you receive follow-up with the Trauma Clinic or clearance by all consultants.     Call the trauma clinic for any of the following or for questions/concerns;  --fever over 101F  --redness, swelling, hardness or warmth at the wound site(s). --Unrelieved nausea/vomiting  --Foul smelling or cloudy drainage at the wound site(s)  --Unrelieved pain or increase in pain  --Increase in shortness of breath    Follow-up:  Trauma Clinic: [phone] option 2  [address]    Your ribs are curved bones in your chest that protect inner structures like your lungs and heart. The ribs expand and contract when you breathe. Pain can result making it hard to cough or breathe deeply. The difficulty increases if several ribs are broken on both sides. You are likely to heal in 6 to 8 weeks, but may take longer if you are elderly. Most rib fractures heal on their own with no lasting effects. Treatment:    Take the medications given to you as prescribed. Your pain may not go completely away after discharge from the hospital, but we want your pain tolerable so you can take deep breaths.  As your pain becomes controlled you may switch to taking over-the-counter medications such as Tylenol and or Ibuprofen as directed. o Tylenol (acetaminophen) 1000mg every 6 hours or you may take 650mg every 4 hours. o Ibuprofen up to 800mg every 8 hours. (Please take with food or milk).  Over the counter creams and patches such as Salon Pas, [employer], Aspercream, can be useful as well.  You were likely given a breathing device called an incentive spirometer while in the hospital to practice deep breathing. It is advised to continue this several times a day while at home to prevent pneumonia. Prevent Complications:  Try to take 5-10 deep breaths every hour while awake. Use the incentive spirometer often. Set goals of deeper breathing every couple of days until reaching normal adult level of about 2500 ml on the printed scale. Activity:  Avoid further chest injury.   Plan quiet activity for the first few days. Do not stay in bed. Walk around and move. No rough activities with family, friends or pets. No sports, jumping, jogging or gymnastics. Ask your doctor or the trauma clinic when you will be able to resume these activities. Symptoms to Report:  Call your doctor right away if you notice any of these symptoms:    Increased chest pain   Shortness of breath   Fever   Coughing up blood    Call 911 immediately if these symptoms are severe.     Follow-up:  Trauma Clinic: [phone] option 2  [address] davis, [address] [place]       Follow up:   [address]  [address] [id number]  [address], [address] [place] [address] [address] [place] [address] [id number]    In 2 weeks  For follow up    [address]  [address] [place] [address] Bernard Oneydaaré [id number]  Call in 1 week  1 week after discharged home       Signed:  Marissa Schafer MD  8/28/2020  8:22 PM

## 2020-08-24 NOTE — PROGRESS NOTES
TRAUMA TERTIARY    Admit Date: 8/23/2020    [facility]    CC:    Chief Complaint   Patient presents with   [facility] Trauma     Transfer from [facility]. Fall a couple days ago. C2 fx and multiple rib fx. Alcohol pre-screening:  How many times in the past year have you had 4-5 or more drinks in a day?  none    Subjective: Fall from standing. Neck pain and shoulder pain. No new findings. . Objective:     Patient Vitals for the past 8 hrs:   BP Temp Temp src Pulse Resp SpO2   08/23/20 2300 121/64 97.8 °F (36.6 °C) Temporal 70 16 94 %       I/O last 3 completed shifts:  In: -   Out: 400 [Urine:400]  I/O this shift:  In: 650 [P.O.:120; I.V.:530]  Out: 250 [Urine:250]    Radiology:  CT THORACIC SPINE WO CONTRAST   Final Result   There is no evidence of chest wall trauma, particularly, no evidence   of trauma involving the thoracic spine. Bilateral rib fractures   documented on standard CT imaging were not included in the spinal   images. There is a 13 mm diameter mass in the posterior medial right pulmonary   apical region, which could be further characterized with PET CT   imaging if it has not been previously evaluated. 3 month CT chest   follow-up evaluation may document stability in this patient of   advanced age, it PET CT imaging is not feasible. Dependent atelectasis   and pleural thickening in both lungs is noted. CT LUMBAR SPINE WO CONTRAST   Final Result      No acute fracture or spondylolisthesis is identified on CT of lumbar   spine. Diffuse degenerative disc and facet disease result in multilevel   central and foraminal stenosis. 3.6 cm infrarenal abdominal aortic aneurysm. XR PELVIS (1-2 VIEWS)   Final Result      No acute fracture is identified. Osteopenia. Degenerative disc disease. Prior internal fixation of proximal femur fracture bilaterally. XR CHEST PORTABLE   Final Result      No evidence for acute cardiopulmonary process. MRI CERVICAL SPINE WO CONTRAST    (Results Pending)       PHYSICAL EXAM:   GCS:  4 - Opens eyes on own   6 - Follows simple motor commands  5 - Alert and oriented    Pupil size: Left 4 mm     Right 4 mm  Pupil reaction: Yes  Wiggles fingers: Left Yes     Right Yes  Wiggles toes: Left Yes     Right Yes  Plantar flexion: Left normal     Right normal    GENERAL:  NAD. A&Ox3. HEAD:  Normocephalic, bruise posterior right scalp  LUNGS:  No increased work of breathing. CTAB. CARDIOVASCULAR: RR  ABDOMEN:  Soft, non-distended, non-tender. No guarding, rigidity, rebound. EXTREMITIES:  MAEx4. No LE edema. Atraumatic. SKIN:  Warm and dry      Spine:       Spine Tenderness ROM   Cervical 5 /10 Normal   Thoracic 0 /10 Normal   Lumbar 0 /10 Normal     Musculoskeletal:    Joint Tenderness Swelling/Deformity ROM   Right shoulder absent absent normal   Left shoulder absent absent normal   Right elbow absent absent normal   Left elbow absent absent normal   Right wrist absent absent normal   Left wrist absent absent normal   Right hand grasp absent absent normal   Left hand grasp absent absent normal   Right hip absent absent normal   Left hip absent absent normal   Right knee absent absent normal   Left knee absent absent normal   Right ankle absent absent normal   Left ankle absent absent normal   Right foot absent absent normal   Left foot absent absent normal         CONSULTS: Neurosurgery      Active Problems:    Fall from standing    Closed nondisplaced fracture of second cervical vertebra (HCC)    right 3rd-5th rib and left 9th rib fractures. Pulmonary nodule, right- Incidental     Liver cyst- Incidental   Resolved Problems:    * No resolved hospital problems. *        Assessment/Plan:     Neuro: C2 fracture. Will consider CTA neck if creatinine better this morning. Tommas Baptise GCS 15. Pain control. CV: No acute issues. Pulm: Bilateral rib fractures. Encourage IS/SMI. GI: No acute issues. Bowel regimen. Zofran PRN.    Renal: No acute issues. Endocrine: No acute issues. MSK: No acute issues. PT/OT. Heme: No acute issues. ID: No acute issues.     Pain/Analgesia: Percocet/Dilaudid/Robaxin  Bowel Regimen: Senokot/MiraLAX  DVT PPx:  SCDs, heparin 5000  GI PPx:  Famotidine     Code status:  Full Code    Disposition: Continue care monitor floor    Ruddy Briones MD  Resident, PGY-2  8/24/2020  6:19 AM

## 2020-08-24 NOTE — PLAN OF CARE
Problem: Skin Integrity:  Goal: Will show no infection signs and symptoms  Description: Will show no infection signs and symptoms  Outcome: Met This Shift  Goal: Absence of new skin breakdown  Description: Absence of new skin breakdown  Outcome: Met This Shift     Problem: Falls - Risk of:  Goal: Will remain free from falls  Description: Will remain free from falls  Outcome: Met This Shift

## 2020-08-24 NOTE — CARE COORDINATION
Attempted to meet with the patient at the bedside. Patient currently receiving nursing care and is reported to be confused at times. Call placed to the patient's daughter Juan Carlos Powell to discuss transition of care planning. Patient does not have a POA. Patient has had multiple falls at her [address], at least 4 this month. Patient had been ambulating with a walker PTA. Patient has a history of inpatient rehab [facility] in [place]. Per Juan Carlos Powell, either facility is fine for the patient to transition to depending on what is appropriate. Spoke with Therapy. Patient is appropriate for NADER at discharge. Call placed to Paige, liaison with [facility]. They can accept the patient and she can transition to their facility [facility] under [address] if scores are appropriate. NO COVID test needed as long as the patient has no s/s. CXR negative. CT chest shows new pulmonary nodule, f/u in 3 months for repeat scans. Await MRI of the cervical spine and further input from Neurosurgery. HENS completed and envelope and transfer paperwork in the soft chart. Will continue to follow.      Karen Lopes.  P:  [phone]

## 2020-08-25 LAB
ANION GAP SERPL CALCULATED.3IONS-SCNC: 13 MMOL/L (ref 7–16)
BASOPHILS ABSOLUTE: 0.04 E9/L (ref 0–0.2)
BASOPHILS RELATIVE PERCENT: 0.4 % (ref 0–2)
BUN BLDV-MCNC: 29 MG/DL (ref 8–23)
CALCIUM SERPL-MCNC: 10.1 MG/DL (ref 8.6–10.2)
CHLORIDE BLD-SCNC: 96 MMOL/L (ref 98–107)
CO2: 23 MMOL/L (ref 22–29)
CREAT SERPL-MCNC: 1.3 MG/DL (ref 0.5–1)
EOSINOPHILS ABSOLUTE: 0.22 E9/L (ref 0.05–0.5)
EOSINOPHILS RELATIVE PERCENT: 2 % (ref 0–6)
GFR AFRICAN AMERICAN: 47
GFR NON-AFRICAN AMERICAN: 39 ML/MIN/1.73
GLUCOSE BLD-MCNC: 98 MG/DL (ref 74–99)
HCT VFR BLD CALC: 35.8 % (ref 34–48)
HEMOGLOBIN: 12.1 G/DL (ref 11.5–15.5)
IMMATURE GRANULOCYTES #: 0.08 E9/L
IMMATURE GRANULOCYTES %: 0.7 % (ref 0–5)
LYMPHOCYTES ABSOLUTE: 1.32 E9/L (ref 1.5–4)
LYMPHOCYTES RELATIVE PERCENT: 12.2 % (ref 20–42)
MCH RBC QN AUTO: 29.7 PG (ref 26–35)
MCHC RBC AUTO-ENTMCNC: 33.8 % (ref 32–34.5)
MCV RBC AUTO: 88 FL (ref 80–99.9)
METER GLUCOSE: 107 MG/DL (ref 74–99)
METER GLUCOSE: 129 MG/DL (ref 74–99)
METER GLUCOSE: 97 MG/DL (ref 74–99)
METER GLUCOSE: 98 MG/DL (ref 74–99)
MONOCYTES ABSOLUTE: 0.92 E9/L (ref 0.1–0.95)
MONOCYTES RELATIVE PERCENT: 8.5 % (ref 2–12)
NEUTROPHILS ABSOLUTE: 8.28 E9/L (ref 1.8–7.3)
NEUTROPHILS RELATIVE PERCENT: 76.2 % (ref 43–80)
PDW BLD-RTO: 14.6 FL (ref 11.5–15)
PLATELET # BLD: 215 E9/L (ref 130–450)
PMV BLD AUTO: 8.9 FL (ref 7–12)
POTASSIUM REFLEX MAGNESIUM: 4.3 MMOL/L (ref 3.5–5)
RBC # BLD: 4.07 E12/L (ref 3.5–5.5)
SODIUM BLD-SCNC: 132 MMOL/L (ref 132–146)
WBC # BLD: 10.9 E9/L (ref 4.5–11.5)

## 2020-08-25 PROCEDURE — 6370000000 HC RX 637 (ALT 250 FOR IP): Performed by: SURGERY

## 2020-08-25 PROCEDURE — 6370000000 HC RX 637 (ALT 250 FOR IP): Performed by: STUDENT IN AN ORGANIZED HEALTH CARE EDUCATION/TRAINING PROGRAM

## 2020-08-25 PROCEDURE — 82962 GLUCOSE BLOOD TEST: CPT

## 2020-08-25 PROCEDURE — 2580000003 HC RX 258: Performed by: STUDENT IN AN ORGANIZED HEALTH CARE EDUCATION/TRAINING PROGRAM

## 2020-08-25 PROCEDURE — 80048 BASIC METABOLIC PNL TOTAL CA: CPT

## 2020-08-25 PROCEDURE — 97530 THERAPEUTIC ACTIVITIES: CPT

## 2020-08-25 PROCEDURE — 97535 SELF CARE MNGMENT TRAINING: CPT

## 2020-08-25 PROCEDURE — 99232 SBSQ HOSP IP/OBS MODERATE 35: CPT | Performed by: SURGERY

## 2020-08-25 PROCEDURE — 36415 COLL VENOUS BLD VENIPUNCTURE: CPT

## 2020-08-25 PROCEDURE — 85025 COMPLETE CBC W/AUTO DIFF WBC: CPT

## 2020-08-25 PROCEDURE — 6360000002 HC RX W HCPCS: Performed by: STUDENT IN AN ORGANIZED HEALTH CARE EDUCATION/TRAINING PROGRAM

## 2020-08-25 PROCEDURE — 2060000000 HC ICU INTERMEDIATE R&B

## 2020-08-25 RX ADMIN — DOCUSATE SODIUM 50 MG AND SENNOSIDES 8.6 MG 1 TABLET: 8.6; 5 TABLET, FILM COATED ORAL at 09:27

## 2020-08-25 RX ADMIN — ASPIRIN 81 MG CHEWABLE TABLET 81 MG: 81 TABLET CHEWABLE at 09:26

## 2020-08-25 RX ADMIN — HEPARIN SODIUM 5000 UNITS: 10000 INJECTION INTRAVENOUS; SUBCUTANEOUS at 05:35

## 2020-08-25 RX ADMIN — METFORMIN HYDROCHLORIDE 1000 MG: 1000 TABLET ORAL at 09:27

## 2020-08-25 RX ADMIN — SODIUM CHLORIDE, POTASSIUM CHLORIDE, SODIUM LACTATE AND CALCIUM CHLORIDE: 600; 310; 30; 20 INJECTION, SOLUTION INTRAVENOUS at 22:01

## 2020-08-25 RX ADMIN — CITALOPRAM 20 MG: 20 TABLET, FILM COATED ORAL at 09:27

## 2020-08-25 RX ADMIN — METHOCARBAMOL TABLETS 750 MG: 750 TABLET, COATED ORAL at 09:27

## 2020-08-25 RX ADMIN — SODIUM CHLORIDE, POTASSIUM CHLORIDE, SODIUM LACTATE AND CALCIUM CHLORIDE: 600; 310; 30; 20 INJECTION, SOLUTION INTRAVENOUS at 05:36

## 2020-08-25 ASSESSMENT — PAIN SCALES - GENERAL
PAINLEVEL_OUTOF10: 4
PAINLEVEL_OUTOF10: 0

## 2020-08-25 ASSESSMENT — PAIN DESCRIPTION - LOCATION: LOCATION: SHOULDER

## 2020-08-25 ASSESSMENT — PAIN DESCRIPTION - PAIN TYPE: TYPE: ACUTE PAIN

## 2020-08-25 ASSESSMENT — PAIN DESCRIPTION - ORIENTATION: ORIENTATION: RIGHT

## 2020-08-25 NOTE — PROGRESS NOTES
Attempting to find out if the patient's mechanical valve is compatible for an MRI. I called Dr. Mclaughlin Signs office, the doctor who performed her heart surgery. I was told that they would have no record of the mechanical valve being MRI compatible or not. The office told me to call [facility], where the procedure was performed. Received operative notes from [facility], and there is no information regarding whether the valve is mri compatible or not.

## 2020-08-25 NOTE — PROGRESS NOTES
Doctor who performed the patient's CABG was Dr. Ileen Sanders, not Dr. Yamilet Masters. I spoke to the  at Dr. Nico Pimentel office regarding information on valve replacement. She said she will look into the patient's records and call us tomorrow.

## 2020-08-25 NOTE — PROGRESS NOTES
Spoke with the patient at length. Patient is unsure that she had a valve replacement. Patient just knew that she had triple bypass surgery. The surgical report from [facility] does not indicate that she had any valve replacements, just stents. Will inform the [facility] of this infromation to see if MRI can be done.

## 2020-08-25 NOTE — PROGRESS NOTES
TRAUMA SURGERY  ATTENDING PROGRESS NOTE      CC: fall     S:   feeling ok wants to leave    O:   @BP (!) 144/70   Pulse 68   Temp 96.3 °F (35.7 °C) (Temporal)   Resp 18   Ht 5' 6\" (1.676 m)   Wt 150 lb (68 kg)   SpO2 93%   BMI 24.21 kg/m² @    Gen - no apparent distress   Neuro - Awake, alert, attentive    HEENT - PERRL 3mm, posterior cephalohematoma   Lungs - non labored, BS clear + chest wall tenderness    Heart - RR   Abdomen - snt   Spine -   c spine tenderness mild/moderate    Ext- nvi rom wnl     A/P: 82 yo female s/p mechanical fall with C2 fx and b/l rib fx       Active Problems:    Fall from standing    Closed nondisplaced fracture of second cervical vertebra (HCC)    right 3rd-5th rib and left 9th rib fractures. Pulmonary nodule, right- Incidental     Liver cyst- Incidental   Resolved Problems:    * No resolved hospital problems. *      - C2 fx c collar, NS following, given low velocity mechanism elevated Cr, will forgo screening cta neck.  Soft collar per NS    - Rib fx smi deep breathing pain control   - Home meds, DM SSI,metformin, carb cont diet  - PTOT placement     DVT prophylaxis: SCD, heparin tid     Gigi Blair MD FACS

## 2020-08-25 NOTE — PROGRESS NOTES
A  Washing off face & combing hair  seated in chair  Modified [place]    UB Dressing Moderate Assist   Mod A  Managing gown  Supervision    LB Dressing Maximal Assist   Max A  Donning socks bed level, limited reach noted  Minimal Assist    Bathing Maximal Assist Max A  Simulated  Minimal Assist    Toileting Maximal Assist   Dep  Gonzalez present  Minimal Assist    Bed Mobility  Supine to sit: Minimal Assist   Sit to supine: NT  Mod A  Supine to sit with cues for technique & safety   Supine to sit: Modified [place]   Sit to supine: Modified [place]    Functional Transfers Sit to stand: Minimal Assist   Stand to sit: Minimal Assist   Stand pivot: Moderate Assist with ww Mod A  Cues for hand placement & safety  Stand by Assist    Functional Mobility  (Ambulation) Mod A with ww 2-3 small side steps bed to chair  Pt reported fear of falling forward during mobility.   Mod A   Less than household distances with walker, moderate time to complete short distance, small shuffling steps  SBA with ww  Short functional distance   Balance Sitting:     Static:  SBA    Dynamic:min A  Standing: mod A Sitting:     Static:  SBA    Dynamic: Min A  Standing:  Mod A  With walker      Activity Tolerance Poor for OOB activity  Fair  No SOB or increase of pain  Good for OOB ADLs   Visual/  Perceptual Glasses: yes             BUE  ROM/Strength/  Fine motor Coordination RUE: ROM grossly WFL     Strength: grossly 3+/5      Strength:  WFL     Coordination: WFL     LUE: ROM minimal shoulder, partial elbow ROM     Strength: NT      Strength: fair     Coordination: poor+  No change, minimal movement of L UE    Instructed pt on L UE AROM exercises with Fair understanding  Increase LUE AROM to partial ROM for improved indep with      Education:  Pt was educated through out treatment regarding proper technique & safety with bed mobility, functional transfers & mobility, walker safety management & ADL compensatory strategies to ease tasks to improve safety & prevent falls and allow pt to return home safely. Max education on cervical precautions & wearing collar due to C-2 fracture with Fair understanding but decreased desire to follow instructions. Comments: Upon arrival pt was in bed, no aspen present collar present (staff member reporting pt removed it). Educated pt on importance of wearing collar, skyla when OOB. Pt agreeable to wearing collar & participating in therapy. At end of session pt was seated in chair, staff informed, TSM present, all lines and tubes intact & call light within reach. · Pt has made Fair progress towards set goals. · Continue with current plan of care      Treatment Time In: 1:20           Treatment Time Out: 1:50         Treatment Charges: Mins Units   Ther Ex  65035     Manual Therapy 07067     Thera Activities 45606 15 1   ADL/Home Mgt 39377 15 1   Neuro Re-ed 57216     Group Therapy      Orthotic manage/training  13695     Non-Billable Time     Total Timed Treatment 30 2       Elzbieta RICHMOND  [address] [place] [address], [address] [place] [address]

## 2020-08-25 NOTE — PROGRESS NOTES
Telesittter in place to prevent patient from taking aspen cervical collar off. Patient continues to refuse and remove the aspen cervical collar. Patient crying and upset.

## 2020-08-25 NOTE — PROGRESS NOTES
Spoke to [email or web address] and she said they are going to get a hold of patients daughter today to see if she can find out who heart doctor is to be able to get more info on patients heart valve replacement. Without that information patients MRI is on hold. Chaparrita Rausch

## 2020-08-25 NOTE — PROGRESS NOTES
measures 1.1 x 0.8 cm. Bibasilar atelectasis. No pleural effusion or pneumothorax. The central airways are grossly patent. Upper Abdomen: Large cyst centered within the right hepatic lobe now demonstrates some dependently layering hyperdensity which could represent proteinaceous versus hemorrhagic content. The adrenal glands are normal. Soft Tissues/Bones: Diffuse osteopenia. Multilevel degenerative changes of the visualized spine. Mildly displaced fractures of the right lateral 3rd, 4th, and 5th ribs. Mildly displaced fracture of the posterolateral left 9th rib. .     1. Mildly displaced right 3rd-5th rib and left 9th rib fractures. 2. The spiculated nodule in the right upper lobe measuring up to 1.1 cm. Recommendations described below. 3. Multivessel coronary artery disease. Stable dilation of the ascending thoracic aorta measuring up to 4.4 cm, previously 4.3 cm. 4. Hypodensity posterior to the cervical esophagus measuring up to 2.2 cm. Finding could represent a Zenker's diverticulum. 5. Large cyst centered within the right hepatic lobe now demonstrates layering hyperdensity which could be related to proteinaceous versus hemorrhagic content. RECOMMENDATIONS: 11 mm solid suspicious pulmonary nodule within the upper lobe. Consider a non-contrast Chest CT at 3 months, a PET/CT, or tissue sampling. These guidelines do not apply to immunocompromised patients and patients with cancer. Follow up in patients with significant comorbidities as clinically warranted. For lung cancer screening, adhere to Lung-RADS guidelines. Reference: Radiology. 2017; 284(1):228-43. Ct Cervical Spine Wo Contrast    Result Date: 8/23/2020  EXAMINATION: CT OF THE CERVICAL SPINE WITHOUT CONTRAST 8/23/2020 9:23 am TECHNIQUE: CT of the cervical spine was performed without the administration of intravenous contrast. Multiplanar reformatted images are provided for review.  Dose modulation, iterative reconstruction, and/or weight based adjustment of the mA/kV was utilized to reduce the radiation dose to as low as reasonably achievable. COMPARISON: None. HISTORY: ORDERING SYSTEM PROVIDED HISTORY: fall TECHNOLOGIST PROVIDED HISTORY: Reason for exam:->fall FINDINGS: BONES/ALIGNMENT: There is a nondisplaced fracture involving the left superior articular facet, and appears to extend into the inferior articular facet on the left. DEGENERATIVE CHANGES: Multilevel degenerative changes throughout the cervical spine. SOFT TISSUES: There is no prevertebral soft tissue swelling. There is a 9 mm spiculated lesion in the right lung apex. Nondisplaced fracture left articular facet of C2. No compression deformities. Ct Thoracic Spine Wo Contrast    Result Date: 2020  Patient Mrn: [id number] : 1935 Age:  81 years Gender: Female Order Date: 2020 6:20 PM Exam: CT THORACIC SPINE WO CONTRAST NUMBER OF IMAGES:  [id number] INDICATION: Trauma, advanced age COMPARISON: Thoracic CT images [id number] TECHNIQUE: Helical images were extended through the thoracic spine without contrast, and reformatted at bone window and soft tissue window settings in all 3 imaging planes. Low-dose CT  acquisition technique included one of following options; 1 . Automated exposure control, 2. Adjustment of MA and or KV according to patient's size or 3. Use of iterative reconstruction. Findings: Thoracic vertebrae show diffuse demineralization, and flowing ventral osteophytes suggests a spectrum of DISH-type change. There is a kyphotic curvature with the apex at about the T7 level. The central spinal canal and foramina appear patent, and the posterior elements appear intact throughout. Coronal images show no lateral deviation of the thoracic spine. Axial images show atherosclerotic vertebral arterial calcifications, but the medial ribs and thoracic vertebrae appear intact throughout.  Standard CT imaging included more the anterior and lateral chest wall, and reported fractures of the right third through fifth and left ninth ribs. There is ectasia and intimal calcification of the thoracic aorta, and coronary artery calcifications are present. Lung window images show a posteromedial right pulmonary apical irregularly marginated nodular density of 13 mm diameter which is not further characterized on this study. It could be evaluated by PET CT imaging, short-term interval 3 month chest CT follow-up, or less desirable in this age range, percutaneous biopsy. There are small pleural effusions with basilar atelectasis. There is no evidence of chest wall trauma, particularly, no evidence of trauma involving the thoracic spine. Bilateral rib fractures documented on standard CT imaging were not included in the spinal images. There is a 13 mm diameter mass in the posterior medial right pulmonary apical region, which could be further characterized with PET CT imaging if it has not been previously evaluated. 3 month CT chest follow-up evaluation may document stability in this patient of advanced age, it PET CT imaging is not feasible. Dependent atelectasis and pleural thickening in both lungs is noted. Ct Lumbar Spine Wo Contrast    Result Date: 8/23/2020  This examination and all examinations utilizing ionizing radiation at this facility are done so according to the ALARA (as low as reasonably achievable) principal for radiation dose reduction. COMPARISON: None. TECHNIQUE: Axial, sagittal, and coronal computed tomography of the lumbar spine was performed without contrast. FINDINGS: There is no evidence of acute displaced cortical disruption or spondylolisthesis. The vertebral bodies and disks are normal in vertical dimension and alignment is maintained. There is diffuse degenerative spondylosis and facet arthropathy. Circumferential disc bulge, ligamentum flavum hypertrophy and degenerative facet arthropathy result in central spinal canal stenosis at L2-3, L3-4 and L4-5.  Posterior lateral disc and osteophyte complex and degenerative facet arthropathy result in foraminal stenosis on the right at L3-4 and L4-5, on the left at L4-5 and L5-S1. Calcification and ectasia of the abdominal aorta and its branches. Fusiform aneurysmal dilation of the abdominal aorta measures 3.6 cm in the infrarenal distribution. Otherwise the regional soft tissue and osseous structures are unremarkable. No acute fracture or spondylolisthesis is identified on CT of lumbar spine. Diffuse degenerative disc and facet disease result in multilevel central and foraminal stenosis. 3.6 cm infrarenal abdominal aortic aneurysm. Xr Chest Portable    Result Date: 8/23/2020  Clinical indications: Rib fractures. TECHNIQUE: Single frontal projection of the chest (1 view). COMPARISON: January 16, 2015. FINDINGS: Prior median sternotomy wires. There is calcification within thoracic aorta. Acromioclavicular arthropathy. The heart, lungs, mediastinum and regional skeleton are otherwise unremarkable. No evidence for acute cardiopulmonary process.      CBC:   Lab Results   Component Value Date    WBC 10.9 08/25/2020    RBC 4.07 08/25/2020    HGB 12.1 08/25/2020    HCT 35.8 08/25/2020    MCV 88.0 08/25/2020    MCH 29.7 08/25/2020    MCHC 33.8 08/25/2020    RDW 14.6 08/25/2020     08/25/2020    MPV 8.9 08/25/2020     BMP:    Lab Results   Component Value Date     08/25/2020    K 4.3 08/25/2020    CL 96 08/25/2020    CO2 23 08/25/2020    BUN 29 08/25/2020    LABALBU 4.2 05/09/2019    CREATININE 1.3 08/25/2020    CALCIUM 10.1 08/25/2020    GFRAA 47 08/25/2020    LABGLOM 39 08/25/2020    GLUCOSE 98 08/25/2020      metFORMIN  1,000 mg Oral BID WC    aspirin  81 mg Oral Daily    citalopram  20 mg Oral Daily    insulin glargine  20 Units Subcutaneous Nightly    oxybutynin  5 mg Oral Nightly    insulin lispro  0-12 Units Subcutaneous TID WC    insulin lispro  0-6 Units Subcutaneous Nightly    sodium chloride flush  10 mL Intravenous 2 times per day    sennosides-docusate sodium  1 tablet Oral BID    heparin (porcine)  5,000 Units Subcutaneous 3 times per day    lidocaine  2 patch Transdermal Daily    methocarbamol  750 mg Oral TID     Awake and alert, collar in place perrl eomi motor full  Assessment:  Patient Active Problem List   Diagnosis    Fracture of left humerus    Hypercholesterolemia    Systolic hypertension    History of coronary artery bypass graft    COPD (chronic obstructive pulmonary disease) ([address]    Non-insulin dependent type 2 diabetes mellitus ([address]    Fall from standing    Closed nondisplaced fracture of second cervical vertebra ([address]    right 3rd-5th rib and left 9th rib fractures.     Pulmonary nodule, right- Incidental     Liver cyst- Incidental      Plan:await MRI   Denisha Dunn M.D.

## 2020-08-25 NOTE — PROGRESS NOTES
[facility] and [facility] have no information on whether the mitral valve replacement that the patient has is MRI compatible. Told the resident that that we can obtain the records from the [address] [place] [address] and let our MRI department determine if the valve is MRI compatible.

## 2020-08-25 NOTE — PLAN OF CARE
Problem: Skin Integrity:  Goal: Will show no infection signs and symptoms  Description: Will show no infection signs and symptoms  Outcome: Met This Shift  Goal: Absence of new skin breakdown  Description: Absence of new skin breakdown  Outcome: Met This Shift     Problem: Falls - Risk of:  Goal: Will remain free from falls  Description: Will remain free from falls  Outcome: Met This Shift  Goal: Absence of physical injury  Description: Absence of physical injury  Outcome: Met This Shift     Problem: Pain:  Goal: Pain level will decrease  Description: Pain level will decrease  Outcome: Met This Shift  Goal: Control of acute pain  Description: Control of acute pain  Outcome: Met This Shift

## 2020-08-25 NOTE — PROGRESS NOTES
Notified [facility] of multiple attempts of identifying if the patients heart valve is MRI compatible from patient, family, and [facility]. Although, attempts have been unsuccessful.

## 2020-08-25 NOTE — PROGRESS NOTES
Physical Therapy  Treatment Note    Name: Bridgette Odell  : 1935  MRN: [id number]    Referring Provider:  Harpreet Walker MD    Date of Service: 2020    Evaluating PT:  Pancho Geronimo, PT, DPT [id number]    Room #:  6636/0973-V  Diagnosis:  Fall from standing, initial encounter  PMHx/PSHx:  DM, CAD, COPD, HTN, HLD  Precautions:  Falls, cervical precautions for C2 fx, aspen collar  Equipment Needs:  TBD     SUBJECTIVE:    Pt lives at [facility]. Pt ambulated with rollator PTA. Pt reports she receives assistance with ADLs as needed. Pt reports 4 falls at [facility] recently. She reports multiple falls in her past as well. OBJECTIVE:   Initial Evaluation  Date: 2020 Treatment  Date: 2020 Short Term/ Long Term   Goals   AM-PAC 6 Clicks 88/17 79/82    Was pt agreeable to Eval/treatment? yes yes    Does pt have pain? 8/10 neck and shoulder pain Neck and shoulder pain    Bed Mobility  Rolling: Ana  Supine to sit: Ana  Sit to supine: NT  Scooting: Ana Rolling: Ana  Supine to sit: modA  Sit to supine: NT  Scooting: Ana Rolling: Independent  Supine to sit:  Independent  Sit to supine: Independent  Scooting: Independent   Transfers Sit to stand: Ana  Stand to sit: Ana  Stand pivot: modA front [employer] Sit<>stand: modA  Stand pivot: modA front [employer], second person for front [employer] Sit to stand: SBA  Stand to sit: SBA  Stand pivot: SBA front [employer]   Ambulation    NT, pt declined 10 feet with front Cleda Crick progressing to nAa  retropulsive in standing, improved with cueing and assistance 25 feet with front [employer] negotiation: ascended and descended  NT NT NA   ROM BUE:  Per OT note  BLE:  WNL except L ankle 5 degrees of DF with firm end feel     Strength BUE:  Per OT note  BLE:  WNL except L ankle 2+ df     Balance Sitting EOB:  SBA  Dynamic Standing:  modA front [employer] Sitting EOB: SBA  Dynamic standing: min/modA Sitting EOB:  Independent  Dynamic Standing:  Ana front [employer]      Pt is A & O x 3 (self,

## 2020-08-25 NOTE — PROGRESS NOTES
Spoke with Dewayne Mejia in [address]. Informed her that operative report from [facility] only showed she had a CABG, not a valve replacement. Faxed a copy of the operative report to MRI department. Informed her that Dr. Mahsa Jara office will call back tomorrow with more info.

## 2020-08-26 ENCOUNTER — APPOINTMENT (OUTPATIENT)
Dept: MRI IMAGING | Age: 85
DRG: 552 | End: 2020-08-26
Payer: COMMERCIAL

## 2020-08-26 LAB
ANION GAP SERPL CALCULATED.3IONS-SCNC: 14 MMOL/L (ref 7–16)
BASOPHILS ABSOLUTE: 0.05 E9/L (ref 0–0.2)
BASOPHILS RELATIVE PERCENT: 0.6 % (ref 0–2)
BUN BLDV-MCNC: 21 MG/DL (ref 8–23)
CALCIUM SERPL-MCNC: 9.9 MG/DL (ref 8.6–10.2)
CHLORIDE BLD-SCNC: 99 MMOL/L (ref 98–107)
CO2: 23 MMOL/L (ref 22–29)
CREAT SERPL-MCNC: 1.2 MG/DL (ref 0.5–1)
EOSINOPHILS ABSOLUTE: 0.34 E9/L (ref 0.05–0.5)
EOSINOPHILS RELATIVE PERCENT: 4 % (ref 0–6)
GFR AFRICAN AMERICAN: 52
GFR NON-AFRICAN AMERICAN: 43 ML/MIN/1.73
GLUCOSE BLD-MCNC: 79 MG/DL (ref 74–99)
HCT VFR BLD CALC: 35.5 % (ref 34–48)
HEMOGLOBIN: 11.9 G/DL (ref 11.5–15.5)
IMMATURE GRANULOCYTES #: 0.06 E9/L
IMMATURE GRANULOCYTES %: 0.7 % (ref 0–5)
LYMPHOCYTES ABSOLUTE: 1.12 E9/L (ref 1.5–4)
LYMPHOCYTES RELATIVE PERCENT: 13.3 % (ref 20–42)
MCH RBC QN AUTO: 29.3 PG (ref 26–35)
MCHC RBC AUTO-ENTMCNC: 33.5 % (ref 32–34.5)
MCV RBC AUTO: 87.4 FL (ref 80–99.9)
METER GLUCOSE: 87 MG/DL (ref 74–99)
METER GLUCOSE: 94 MG/DL (ref 74–99)
METER GLUCOSE: 94 MG/DL (ref 74–99)
MONOCYTES ABSOLUTE: 0.79 E9/L (ref 0.1–0.95)
MONOCYTES RELATIVE PERCENT: 9.4 % (ref 2–12)
NEUTROPHILS ABSOLUTE: 6.07 E9/L (ref 1.8–7.3)
NEUTROPHILS RELATIVE PERCENT: 72 % (ref 43–80)
PDW BLD-RTO: 14.7 FL (ref 11.5–15)
PLATELET # BLD: 224 E9/L (ref 130–450)
PMV BLD AUTO: 8.6 FL (ref 7–12)
POTASSIUM REFLEX MAGNESIUM: 4.3 MMOL/L (ref 3.5–5)
RBC # BLD: 4.06 E12/L (ref 3.5–5.5)
SODIUM BLD-SCNC: 136 MMOL/L (ref 132–146)
WBC # BLD: 8.4 E9/L (ref 4.5–11.5)

## 2020-08-26 PROCEDURE — 2580000003 HC RX 258: Performed by: STUDENT IN AN ORGANIZED HEALTH CARE EDUCATION/TRAINING PROGRAM

## 2020-08-26 PROCEDURE — 85025 COMPLETE CBC W/AUTO DIFF WBC: CPT

## 2020-08-26 PROCEDURE — 6360000002 HC RX W HCPCS: Performed by: STUDENT IN AN ORGANIZED HEALTH CARE EDUCATION/TRAINING PROGRAM

## 2020-08-26 PROCEDURE — 99232 SBSQ HOSP IP/OBS MODERATE 35: CPT | Performed by: SURGERY

## 2020-08-26 PROCEDURE — 6370000000 HC RX 637 (ALT 250 FOR IP): Performed by: SURGERY

## 2020-08-26 PROCEDURE — 80048 BASIC METABOLIC PNL TOTAL CA: CPT

## 2020-08-26 PROCEDURE — 82962 GLUCOSE BLOOD TEST: CPT

## 2020-08-26 PROCEDURE — 72141 MRI NECK SPINE W/O DYE: CPT

## 2020-08-26 PROCEDURE — 97530 THERAPEUTIC ACTIVITIES: CPT

## 2020-08-26 PROCEDURE — 2060000000 HC ICU INTERMEDIATE R&B

## 2020-08-26 PROCEDURE — 6370000000 HC RX 637 (ALT 250 FOR IP): Performed by: STUDENT IN AN ORGANIZED HEALTH CARE EDUCATION/TRAINING PROGRAM

## 2020-08-26 PROCEDURE — 36415 COLL VENOUS BLD VENIPUNCTURE: CPT

## 2020-08-26 RX ADMIN — METHOCARBAMOL TABLETS 750 MG: 750 TABLET, COATED ORAL at 09:03

## 2020-08-26 RX ADMIN — CITALOPRAM 20 MG: 20 TABLET, FILM COATED ORAL at 09:03

## 2020-08-26 RX ADMIN — ASPIRIN 81 MG CHEWABLE TABLET 81 MG: 81 TABLET CHEWABLE at 09:03

## 2020-08-26 RX ADMIN — METFORMIN HYDROCHLORIDE 1000 MG: 1000 TABLET ORAL at 09:03

## 2020-08-26 RX ADMIN — HEPARIN SODIUM 5000 UNITS: 10000 INJECTION INTRAVENOUS; SUBCUTANEOUS at 05:49

## 2020-08-26 RX ADMIN — DOCUSATE SODIUM 50 MG AND SENNOSIDES 8.6 MG 1 TABLET: 8.6; 5 TABLET, FILM COATED ORAL at 09:03

## 2020-08-26 RX ADMIN — OXYBUTYNIN CHLORIDE 5 MG: 5 TABLET ORAL at 20:08

## 2020-08-26 RX ADMIN — HEPARIN SODIUM 5000 UNITS: 10000 INJECTION INTRAVENOUS; SUBCUTANEOUS at 20:08

## 2020-08-26 RX ADMIN — Medication 10 ML: at 21:15

## 2020-08-26 RX ADMIN — METHOCARBAMOL TABLETS 750 MG: 750 TABLET, COATED ORAL at 20:08

## 2020-08-26 RX ADMIN — DOCUSATE SODIUM 50 MG AND SENNOSIDES 8.6 MG 1 TABLET: 8.6; 5 TABLET, FILM COATED ORAL at 20:08

## 2020-08-26 ASSESSMENT — PAIN SCALES - GENERAL
PAINLEVEL_OUTOF10: 0
PAINLEVEL_OUTOF10: 0

## 2020-08-26 ASSESSMENT — PAIN SCALES - WONG BAKER: WONGBAKER_NUMERICALRESPONSE: 0

## 2020-08-26 NOTE — PROCEDURES
8/26/20 11 am - Rn contacted [facility] for operative reports of previous heart surgeries and they faxed them to us. No mention of any heart valve replacements in the reports.

## 2020-08-26 NOTE — PROGRESS NOTES
Physical Therapy  Treatment Note    Name: Sal aJy  : 1935  MRN: [id number]    Referring Provider:  Kenneth Ruffin MD    Date of Service: 2020    Evaluating PT:  Leigh Ann Borrego, PT, DPT [id number]    Room #:  3377/8354-D  Diagnosis:  Fall from standing, initial encounter  PMHx/PSHx:  DM, CAD, COPD, HTN, HLD  Precautions:  Falls, cervical precautions for C2 fx, aspen collar  Equipment Needs:  TBD     SUBJECTIVE:    Pt lives at [facility]. Pt ambulated with rollator PTA. Pt reports she receives assistance with ADLs as needed. Pt reports 4 falls at [facility] recently. She reports multiple falls in her past as well. OBJECTIVE:   Initial Evaluation  Date: 2020 Treatment  Date: 2020 Short Term/ Long Term   Goals   AM-PAC 6 Clicks 58/51 78/96    Was pt agreeable to Eval/treatment? yes yes    Does pt have pain? 8/10 neck and shoulder pain No c/o pain    Bed Mobility  Rolling: Ana  Supine to sit: Ana  Sit to supine: NT  Scooting: Ana Rolling: Ana  Supine to sit: Ana  Sit to supine: NT  Scooting: Ana Rolling: Independent  Supine to sit:  Independent  Sit to supine: Independent  Scooting: Independent   Transfers Sit to stand: Ana  Stand to sit: Ana  Stand pivot: modA front [address] Sit<>stand: Ana  Stand pivot: Ana front [address] Sit to stand: SBA  Stand to sit: SBA  Stand pivot: SBA front [address]   Ambulation    NT, pt declined 25 feet front [address] Ana 25 feet with front [address] [employer] negotiation: ascended and descended  NT NT NA   ROM BUE:  Per OT note  BLE:  WNL except L ankle 5 degrees of DF with firm end feel     Strength BUE:  Per OT note  BLE:  WNL except L ankle 2+ df     Balance Sitting EOB:  SBA  Dynamic Standing:  modA front [address] Sitting EOB: SBA  Dynamic standing: Ana front [address] Sitting EOB:  Independent  Dynamic Standing:  Ana front [address]      Pt is A & O x 3 (self, hospital, month and year)  Sensation:  Pt denies numbness and tingling to extremities  Edema:  unremarkable    Patient education  Pt educated on role of PT intervention. Pt educated on safety in room with utilization of call light for assistance with mobility. Reinforced importance of c-collar and spinal precautions. Educated on importance of maximizing OOB time with assistance from nursing staff. Patient response to education:   Pt verbalized understanding Pt demonstrated skill Pt requires further education in this area   yes yes yes     ASSESSMENT:    Comments:  RN cleared pt for activity prior to session. Pt received supine in bed and agreeable to PT intervention at this time. Pt had soft c-collar donned upon entering room. Pt performed all functional mobility as noted above. Pt demonstrating significantly improved cognition and mobility this date. Pt tolerated multiple bouts of ambulation and had few complaints. At end of session, pt transferred to bedside chair and left with all needs met and call light in reach. Pt requires continued skilled PT intervention for the purposes of maximizing functional mobility and independence. Treatment:  Patient practiced and was instructed in the following treatment:     Therapeutic Activities Completed:  o Functional mobility as noted above:   - Bed mobility: Ana all aspects. Mod VC for log roll technique and use of bed rail.  - Transfer training: sit<>stand from chair, EOB, and toilet Ana. Stand pivot transfer with front [employer] Ana with cueing for proper sequencing when turning to sit. - Ambulation: 25 feet x 3 reps with Ana for balance and mod VC for obstacle negotiation and sequencing with front [employer]. Pt with improved step length and balance on this date.  o Skilled repositioning in seated position for comfort.  o Pt education as noted above. PLAN:    Patient is making fair progress towards established goals. Will continue with current POC.       Time in  1456  Time out  1520    Total Treatment Time  24 minutes     CPT codes:  [] Gait training 38824 0 minutes  [] Manual therapy 92760 0 minutes  [x] Therapeutic activities 19242 24 minutes  [] Therapeutic exercises 92532 0 minutes  [] Neuromuscular reeducation 15295 0 minutes    Cecelia Jeffries PT, DPT  [id number]

## 2020-08-26 NOTE — PROGRESS NOTES
Neurosurg progress note  VITALS:  BP (!) 120/56   Pulse 69   Temp 97.3 °F (36.3 °C) (Temporal)   Resp 18   Ht 5' 6\" (1.676 m)   Wt 150 lb (68 kg)   SpO2 92%   BMI 24.21 kg/m²   24HR INTAKE/OUTPUT:    Intake/Output Summary (Last 24 hours) at 8/26/2020 0824  Last data filed at 8/26/2020 8085  Gross per 24 hour   Intake 660 ml   Output 2275 ml   Net -1615 ml     Xr Pelvis (1-2 Views)    Result Date: 8/23/2020  Single view of the pelvis. There is diffuse bone demineralization. Degenerative spondylosis and facet arthropathy are identified in the lower lumbar spine. Osteophytosis and eburnation of sacroiliac joints and hips. Catheter overlies the lower pelvis. Prior internal fixation of proximal femur fracture bilaterally. Otherwise the regional soft tissue and osseous structures are unremarkable. No acute fracture is identified. Osteopenia. Degenerative disc disease. Prior internal fixation of proximal femur fracture bilaterally. Xr Shoulder Right (min 2 Views)    Result Date: 8/23/2020  EXAMINATION: THREE XRAY VIEWS OF THE RIGHT SHOULDER 8/23/2020 9:26 am COMPARISON: None. HISTORY: ORDERING SYSTEM PROVIDED HISTORY: fall TECHNOLOGIST PROVIDED HISTORY: Reason for exam:->fall FINDINGS: Glenohumeral joint is normally aligned. No evidence of acute fracture or dislocation. No abnormal periarticular calcifications. Moderate osteoarthrosis the AC joint. Visualized lung is unremarkable. No acute fractures or dislocations of right shoulder. Ct Head Wo Contrast    Result Date: 8/23/2020  EXAMINATION: CT OF THE HEAD WITHOUT CONTRAST  8/23/2020 9:23 am TECHNIQUE: CT of the head was performed without the administration of intravenous contrast. Dose modulation, iterative reconstruction, and/or weight based adjustment of the mA/kV was utilized to reduce the radiation dose to as low as reasonably achievable.  COMPARISON: CT head January 16, 2015 HISTORY: ORDERING SYSTEM PROVIDED HISTORY: fall TECHNOLOGIST PROVIDED HISTORY: Reason for exam:->fall Has a \"code stroke\" or \"stroke alert\" been called? ->No FINDINGS: BRAIN/VENTRICLES: There is no acute intracranial hemorrhage, mass effect or midline shift. No abnormal extra-axial fluid collection. The gray-white differentiation is maintained without evidence of an acute infarct. There is no evidence of hydrocephalus. ORBITS: The visualized portion of the orbits demonstrate no acute abnormality. SINUSES: Again seen is the right sphenoid sinus inspissated mucus. The visualized paranasal sinuses and mastoid air cells demonstrate no acute abnormality. SOFT TISSUES/SKULL:  No acute abnormality of the visualized skull or soft tissues. No acute intracranial abnormality. Right sphenoid sinus is aerated mucous unchanged. Ct Chest Wo Contrast    Result Date: 8/23/2020  EXAMINATION: CT OF THE CHEST WITHOUT CONTRAST 8/23/2020 9:23 am TECHNIQUE: CT of the chest was performed without the administration of intravenous contrast. Multiplanar reformatted images are provided for review. Dose modulation, iterative reconstruction, and/or weight based adjustment of the mA/kV was utilized to reduce the radiation dose to as low as reasonably achievable. COMPARISON: CT chest performed January 16, 2015 HISTORY: ORDERING SYSTEM PROVIDED HISTORY: Fall TECHNOLOGIST PROVIDED HISTORY: Reason for exam:->Fall FINDINGS: Mediastinum: Multiple grossly stable thyroid gland nodules, not significantly changed from prior examination. Follow-up as clinically indicated. Hypodensity along the posterior left lateral aspect of the cervical esophagus measuring up to 2.2 cm. No significant mediastinal or hilar lymphadenopathy. Multivessel coronary artery disease. Grossly stable dilation of the ascending thoracic aorta measuring up to 4.4 cm in diameter, previously 4.3 cm. Diffuse atherosclerotic disease of the visualized aorta.  Lungs/pleura: New spiculated nodule within the right upper lobe (axial image 20) measures 1.1 x 0.8 cm. Bibasilar atelectasis. No pleural effusion or pneumothorax. The central airways are grossly patent. Upper Abdomen: Large cyst centered within the right hepatic lobe now demonstrates some dependently layering hyperdensity which could represent proteinaceous versus hemorrhagic content. The adrenal glands are normal. Soft Tissues/Bones: Diffuse osteopenia. Multilevel degenerative changes of the visualized spine. Mildly displaced fractures of the right lateral 3rd, 4th, and 5th ribs. Mildly displaced fracture of the posterolateral left 9th rib. .     1. Mildly displaced right 3rd-5th rib and left 9th rib fractures. 2. The spiculated nodule in the right upper lobe measuring up to 1.1 cm. Recommendations described below. 3. Multivessel coronary artery disease. Stable dilation of the ascending thoracic aorta measuring up to 4.4 cm, previously 4.3 cm. 4. Hypodensity posterior to the cervical esophagus measuring up to 2.2 cm. Finding could represent a Zenker's diverticulum. 5. Large cyst centered within the right hepatic lobe now demonstrates layering hyperdensity which could be related to proteinaceous versus hemorrhagic content. RECOMMENDATIONS: 11 mm solid suspicious pulmonary nodule within the upper lobe. Consider a non-contrast Chest CT at 3 months, a PET/CT, or tissue sampling. These guidelines do not apply to immunocompromised patients and patients with cancer. Follow up in patients with significant comorbidities as clinically warranted. For lung cancer screening, adhere to Lung-RADS guidelines. Reference: Radiology. 2017; 284(1):228-43. Ct Cervical Spine Wo Contrast    Result Date: 8/23/2020  EXAMINATION: CT OF THE CERVICAL SPINE WITHOUT CONTRAST 8/23/2020 9:23 am TECHNIQUE: CT of the cervical spine was performed without the administration of intravenous contrast. Multiplanar reformatted images are provided for review.  Dose modulation, iterative reconstruction, and/or weight based adjustment of the mA/kV was utilized to reduce the radiation dose to as low as reasonably achievable. COMPARISON: None. HISTORY: ORDERING SYSTEM PROVIDED HISTORY: fall TECHNOLOGIST PROVIDED HISTORY: Reason for exam:->fall FINDINGS: BONES/ALIGNMENT: There is a nondisplaced fracture involving the left superior articular facet, and appears to extend into the inferior articular facet on the left. DEGENERATIVE CHANGES: Multilevel degenerative changes throughout the cervical spine. SOFT TISSUES: There is no prevertebral soft tissue swelling. There is a 9 mm spiculated lesion in the right lung apex. Nondisplaced fracture left articular facet of C2. No compression deformities. Ct Thoracic Spine Wo Contrast    Result Date: 2020  Patient Mrn: [id number] : 1935 Age:  81 years Gender: Female Order Date: 2020 6:20 PM Exam: CT THORACIC SPINE WO CONTRAST NUMBER OF IMAGES:  908 INDICATION: Trauma, advanced age COMPARISON: Thoracic CT images 6290 TECHNIQUE: Helical images were extended through the thoracic spine without contrast, and reformatted at bone window and soft tissue window settings in all 3 imaging planes. Low-dose CT  acquisition technique included one of following options; 1 . Automated exposure control, 2. Adjustment of MA and or KV according to patient's size or 3. Use of iterative reconstruction. Findings: Thoracic vertebrae show diffuse demineralization, and flowing ventral osteophytes suggests a spectrum of DISH-type change. There is a kyphotic curvature with the apex at about the T7 level. The central spinal canal and foramina appear patent, and the posterior elements appear intact throughout. Coronal images show no lateral deviation of the thoracic spine. Axial images show atherosclerotic vertebral arterial calcifications, but the medial ribs and thoracic vertebrae appear intact throughout.  Standard CT imaging included more the anterior and lateral chest wall, and reported fractures of the right third through fifth and left ninth ribs. There is ectasia and intimal calcification of the thoracic aorta, and coronary artery calcifications are present. Lung window images show a posteromedial right pulmonary apical irregularly marginated nodular density of 13 mm diameter which is not further characterized on this study. It could be evaluated by PET CT imaging, short-term interval 3 month chest CT follow-up, or less desirable in this age range, percutaneous biopsy. There are small pleural effusions with basilar atelectasis. There is no evidence of chest wall trauma, particularly, no evidence of trauma involving the thoracic spine. Bilateral rib fractures documented on standard CT imaging were not included in the spinal images. There is a 13 mm diameter mass in the posterior medial right pulmonary apical region, which could be further characterized with PET CT imaging if it has not been previously evaluated. 3 month CT chest follow-up evaluation may document stability in this patient of advanced age, it PET CT imaging is not feasible. Dependent atelectasis and pleural thickening in both lungs is noted. Ct Lumbar Spine Wo Contrast    Result Date: 8/23/2020  This examination and all examinations utilizing ionizing radiation at this facility are done so according to the ALARA (as low as reasonably achievable) principal for radiation dose reduction. COMPARISON: None. TECHNIQUE: Axial, sagittal, and coronal computed tomography of the lumbar spine was performed without contrast. FINDINGS: There is no evidence of acute displaced cortical disruption or spondylolisthesis. The vertebral bodies and disks are normal in vertical dimension and alignment is maintained. There is diffuse degenerative spondylosis and facet arthropathy. Circumferential disc bulge, ligamentum flavum hypertrophy and degenerative facet arthropathy result in central spinal canal stenosis at L2-3, L3-4 and L4-5. Posterior lateral disc and osteophyte complex and degenerative facet arthropathy result in foraminal stenosis on the right at L3-4 and L4-5, on the left at L4-5 and L5-S1. Calcification and ectasia of the abdominal aorta and its branches. Fusiform aneurysmal dilation of the abdominal aorta measures 3.6 cm in the infrarenal distribution. Otherwise the regional soft tissue and osseous structures are unremarkable. No acute fracture or spondylolisthesis is identified on CT of lumbar spine. Diffuse degenerative disc and facet disease result in multilevel central and foraminal stenosis. 3.6 cm infrarenal abdominal aortic aneurysm. Xr Chest Portable    Result Date: 8/23/2020  Clinical indications: Rib fractures. TECHNIQUE: Single frontal projection of the chest (1 view). COMPARISON: January 16, 2015. FINDINGS: Prior median sternotomy wires. There is calcification within thoracic aorta. Acromioclavicular arthropathy. The heart, lungs, mediastinum and regional skeleton are otherwise unremarkable. No evidence for acute cardiopulmonary process.      CBC:   Lab Results   Component Value Date    WBC 8.4 08/26/2020    RBC 4.06 08/26/2020    HGB 11.9 08/26/2020    HCT 35.5 08/26/2020    MCV 87.4 08/26/2020    MCH 29.3 08/26/2020    MCHC 33.5 08/26/2020    RDW 14.7 08/26/2020     08/26/2020    MPV 8.6 08/26/2020     BMP:    Lab Results   Component Value Date     08/25/2020    K 4.3 08/25/2020    CL 96 08/25/2020    CO2 23 08/25/2020    BUN 29 08/25/2020    LABALBU 4.2 05/09/2019    CREATININE 1.3 08/25/2020    CALCIUM 10.1 08/25/2020    GFRAA 47 08/25/2020    LABGLOM 39 08/25/2020    GLUCOSE 98 08/25/2020      metFORMIN  1,000 mg Oral BID WC    aspirin  81 mg Oral Daily    citalopram  20 mg Oral Daily    insulin glargine  20 Units Subcutaneous Nightly    oxybutynin  5 mg Oral Nightly    insulin lispro  0-12 Units Subcutaneous TID WC    insulin lispro  0-6 Units Subcutaneous Nightly    sodium chloride flush  10 mL Intravenous 2 times per day    sennosides-docusate sodium  1 tablet Oral BID    heparin (porcine)  5,000 Units Subcutaneous 3 times per day    lidocaine  2 patch Transdermal Daily    methocarbamol  750 mg Oral TID     Soft collar in place perrl eomi follows commands  Assessment:  Patient Active Problem List   Diagnosis    Fracture of left humerus    Hypercholesterolemia    Systolic hypertension    History of coronary artery bypass graft    COPD (chronic obstructive pulmonary disease) ([address]    Non-insulin dependent type 2 diabetes mellitus ([address]    Fall from standing    Closed nondisplaced fracture of second cervical vertebra ([address]    right 3rd-5th rib and left 9th rib fractures.     Pulmonary nodule, right- Incidental     Liver cyst- Incidental      Plan:await MRI cervical Continue current care  Delma Arce M.D.

## 2020-08-27 VITALS
SYSTOLIC BLOOD PRESSURE: 116 MMHG | HEIGHT: 66 IN | DIASTOLIC BLOOD PRESSURE: 66 MMHG | RESPIRATION RATE: 16 BRPM | WEIGHT: 150 LBS | OXYGEN SATURATION: 94 % | TEMPERATURE: 97.6 F | HEART RATE: 75 BPM | BODY MASS INDEX: 24.11 KG/M2

## 2020-08-27 LAB
ANION GAP SERPL CALCULATED.3IONS-SCNC: 15 MMOL/L (ref 7–16)
BASOPHILS ABSOLUTE: 0.08 E9/L (ref 0–0.2)
BASOPHILS RELATIVE PERCENT: 0.9 % (ref 0–2)
BUN BLDV-MCNC: 24 MG/DL (ref 8–23)
CALCIUM SERPL-MCNC: 10.3 MG/DL (ref 8.6–10.2)
CHLORIDE BLD-SCNC: 97 MMOL/L (ref 98–107)
CO2: 20 MMOL/L (ref 22–29)
CREAT SERPL-MCNC: 1.3 MG/DL (ref 0.5–1)
EOSINOPHILS ABSOLUTE: 0.39 E9/L (ref 0.05–0.5)
EOSINOPHILS RELATIVE PERCENT: 4.3 % (ref 0–6)
GFR AFRICAN AMERICAN: 47
GFR NON-AFRICAN AMERICAN: 39 ML/MIN/1.73
GLUCOSE BLD-MCNC: 78 MG/DL (ref 74–99)
HCT VFR BLD CALC: 38.6 % (ref 34–48)
HEMOGLOBIN: 12.6 G/DL (ref 11.5–15.5)
IMMATURE GRANULOCYTES #: 0.11 E9/L
IMMATURE GRANULOCYTES %: 1.2 % (ref 0–5)
LYMPHOCYTES ABSOLUTE: 1.7 E9/L (ref 1.5–4)
LYMPHOCYTES RELATIVE PERCENT: 18.5 % (ref 20–42)
MCH RBC QN AUTO: 29.3 PG (ref 26–35)
MCHC RBC AUTO-ENTMCNC: 32.6 % (ref 32–34.5)
MCV RBC AUTO: 89.8 FL (ref 80–99.9)
METER GLUCOSE: 100 MG/DL (ref 74–99)
METER GLUCOSE: 96 MG/DL (ref 74–99)
MONOCYTES ABSOLUTE: 0.94 E9/L (ref 0.1–0.95)
MONOCYTES RELATIVE PERCENT: 10.3 % (ref 2–12)
NEUTROPHILS ABSOLUTE: 5.95 E9/L (ref 1.8–7.3)
NEUTROPHILS RELATIVE PERCENT: 64.8 % (ref 43–80)
PDW BLD-RTO: 15 FL (ref 11.5–15)
PLATELET # BLD: 228 E9/L (ref 130–450)
PMV BLD AUTO: 8.9 FL (ref 7–12)
POTASSIUM REFLEX MAGNESIUM: 5 MMOL/L (ref 3.5–5)
RBC # BLD: 4.3 E12/L (ref 3.5–5.5)
SODIUM BLD-SCNC: 132 MMOL/L (ref 132–146)
WBC # BLD: 9.2 E9/L (ref 4.5–11.5)

## 2020-08-27 PROCEDURE — 6370000000 HC RX 637 (ALT 250 FOR IP): Performed by: SURGERY

## 2020-08-27 PROCEDURE — 82962 GLUCOSE BLOOD TEST: CPT

## 2020-08-27 PROCEDURE — 36415 COLL VENOUS BLD VENIPUNCTURE: CPT

## 2020-08-27 PROCEDURE — 97535 SELF CARE MNGMENT TRAINING: CPT

## 2020-08-27 PROCEDURE — 85025 COMPLETE CBC W/AUTO DIFF WBC: CPT

## 2020-08-27 PROCEDURE — 6370000000 HC RX 637 (ALT 250 FOR IP): Performed by: STUDENT IN AN ORGANIZED HEALTH CARE EDUCATION/TRAINING PROGRAM

## 2020-08-27 PROCEDURE — 2580000003 HC RX 258: Performed by: STUDENT IN AN ORGANIZED HEALTH CARE EDUCATION/TRAINING PROGRAM

## 2020-08-27 PROCEDURE — 97530 THERAPEUTIC ACTIVITIES: CPT

## 2020-08-27 PROCEDURE — 6360000002 HC RX W HCPCS: Performed by: STUDENT IN AN ORGANIZED HEALTH CARE EDUCATION/TRAINING PROGRAM

## 2020-08-27 PROCEDURE — 80048 BASIC METABOLIC PNL TOTAL CA: CPT

## 2020-08-27 PROCEDURE — 99232 SBSQ HOSP IP/OBS MODERATE 35: CPT | Performed by: SURGERY

## 2020-08-27 RX ORDER — ASPIRIN 81 MG/1
81 TABLET, CHEWABLE ORAL DAILY
Qty: 30 TABLET | Refills: 3 | Status: ON HOLD | DISCHARGE
Start: 2020-08-28 | End: 2020-09-03 | Stop reason: HOSPADM

## 2020-08-27 RX ORDER — SENNA AND DOCUSATE SODIUM 50; 8.6 MG/1; MG/1
1 TABLET, FILM COATED ORAL 2 TIMES DAILY
DISCHARGE
Start: 2020-08-27

## 2020-08-27 RX ORDER — HEPARIN SODIUM 10000 [USP'U]/ML
5000 INJECTION, SOLUTION INTRAVENOUS; SUBCUTANEOUS EVERY 8 HOURS SCHEDULED
Status: ON HOLD | DISCHARGE
Start: 2020-08-27 | End: 2022-04-26 | Stop reason: HOSPADM

## 2020-08-27 RX ORDER — LIDOCAINE 4 G/G
2 PATCH TOPICAL DAILY
Status: ON HOLD | DISCHARGE
Start: 2020-08-28 | End: 2020-09-03 | Stop reason: HOSPADM

## 2020-08-27 RX ORDER — METHOCARBAMOL 750 MG/1
750 TABLET, FILM COATED ORAL 3 TIMES DAILY
Qty: 30 TABLET | Refills: 0 | Status: ON HOLD | DISCHARGE
Start: 2020-08-27 | End: 2020-09-03 | Stop reason: HOSPADM

## 2020-08-27 RX ORDER — BISACODYL 10 MG
10 SUPPOSITORY, RECTAL RECTAL DAILY PRN
Status: ON HOLD | DISCHARGE
Start: 2020-08-27 | End: 2020-09-03 | Stop reason: HOSPADM

## 2020-08-27 RX ADMIN — METHOCARBAMOL TABLETS 750 MG: 750 TABLET, COATED ORAL at 08:46

## 2020-08-27 RX ADMIN — DOCUSATE SODIUM 50 MG AND SENNOSIDES 8.6 MG 1 TABLET: 8.6; 5 TABLET, FILM COATED ORAL at 08:45

## 2020-08-27 RX ADMIN — HEPARIN SODIUM 5000 UNITS: 10000 INJECTION INTRAVENOUS; SUBCUTANEOUS at 06:05

## 2020-08-27 RX ADMIN — CITALOPRAM 20 MG: 20 TABLET, FILM COATED ORAL at 08:46

## 2020-08-27 RX ADMIN — METFORMIN HYDROCHLORIDE 1000 MG: 1000 TABLET ORAL at 08:45

## 2020-08-27 RX ADMIN — Medication 10 ML: at 08:45

## 2020-08-27 RX ADMIN — ASPIRIN 81 MG CHEWABLE TABLET 81 MG: 81 TABLET CHEWABLE at 08:46

## 2020-08-27 ASSESSMENT — PAIN SCALES - GENERAL: PAINLEVEL_OUTOF10: 0

## 2020-08-27 NOTE — CARE COORDINATION
HEAVEN spoke with Farrah Cordero at [facility]. She states ok for patient to come today. Have arranged transport for 2:30p via [facility]. Have notified daughter, Mejia Yeh [phone]. Have placed all discharge paperwork on soft chart.

## 2020-08-27 NOTE — PROGRESS NOTES
Physical Therapy  Treatment Note    Name: Tyron Martinez  : 1935  MRN: [id number]    Referring Provider:  Darnell Pandey MD    Date of Service: 2020    Evaluating PT:  Carina Smileyin, PT, DPT [id number]    Room #:  8791/5728-D  Diagnosis:  Fall from standing, initial encounter  PMHx/PSHx:  DM, CAD, COPD, HTN, HLD  Precautions:  Falls, cervical precautions for C2 fx, aspen collar  Equipment Needs:  TBD     SUBJECTIVE:    Pt lives at [facility]. Pt ambulated with rollator PTA. Pt reports she receives assistance with ADLs as needed. Pt reports 4 falls at [facility] recently. She reports multiple falls in her past as well. OBJECTIVE:   Initial Evaluation  Date: 2020 Treatment  Date: 2020 Short Term/ Long Term   Goals   AM-PAC 6 Clicks  33    Was pt agreeable to Eval/treatment? yes yes    Does pt have pain? 8/10 neck and shoulder pain R shoulder pain, does not quantify. States it is severe. Bed Mobility  Rolling: Ana  Supine to sit: Ana  Sit to supine: NT  Scooting: Ana Rolling: Ana  Supine to sit: Ana  Sit to supine: NT  Scooting: Ana Rolling: Independent  Supine to sit:  Independent  Sit to supine: Independent  Scooting: Independent   Transfers Sit to stand: Ana  Stand to sit: Aan  Stand pivot: modA front [employer] Sit<>stand: Ana  Stand pivot: Ana front [employer] Sit to stand: SBA  Stand to sit: SBA  Stand pivot: SBA front [employer]   Ambulation    NT, pt declined 25 feet front [employer] Ana 25 feet with front [employer] negotiation: ascended and descended  NT NT NA   ROM BUE:  Per OT note  BLE:  WNL except L ankle 5 degrees of DF with firm end feel     Strength BUE:  Per OT note  BLE:  WNL except L ankle 2+ df     Balance Sitting EOB:  SBA  Dynamic Standing:  modA front [employer] Sitting EOB: SBA  Dynamic standing: Ana front [employer] Sitting EOB:  Independent  Dynamic Standing:  Ana front [employer]      Pt is A & O x 3 (self, hospital, month and year)  Sensation:  Pt denies numbness and tingling to extremities  Edema: unremarkable    Patient education  Pt educated on role of PT intervention. Pt educated on safety in room with utilization of call light for assistance with mobility. Reinforced importance of c-collar and spinal precautions. Educated on importance of maximizing OOB time with assistance from nursing staff. Patient response to education:   Pt verbalized understanding Pt demonstrated skill Pt requires further education in this area   yes yes yes     ASSESSMENT:    Comments:  RN cleared pt for activity prior to session. Pt received supine in bed and agreeable to PT intervention at this time. Upon entering c-collar found on floor across room as well as IV. Pt upset verbalizing multiple complaints stating no one has been by to see her and no one will tell her why she is here. Pt more confused on this date. Pt performed all functional mobility as noted above. Pt with short shuffling gait pattern and multiple bouts of freezing during ambulation requiring max VC and min physical assistance to correct. At end of session, pt transferred to bedside chair and left with all needs met and call light in reach. Pt requires continued skilled PT intervention for the purposes of maximizing functional mobility and independence. Treatment:  Patient practiced and was instructed in the following treatment:     Therapeutic Activities Completed:  o Functional mobility as noted above:   - Bed mobility: Ana all aspects. Mod VC for log roll technique and use of bed rail.  - Transfer training: sit<>stand from [address] Stand pivot transfer with front [employer] nAa with cueing for proper sequencing when turning to sit. TC for guidance of front [employer] when turning as well. - Ambulation: 25 feet x 2 reps with Ana for balance and mod VC for obstacle negotiation and sequencing with front [employer]. Pt with decreased step length and shuffling gait pattern.   Pt had difficulty clearing feet from ground despite max VC.  o Skilled repositioning

## 2020-08-27 NOTE — PROGRESS NOTES
Occupational Therapy  OT BEDSIDE TREATMENT NOTE      Date:2020  Patient Name: Juliette Herrera  MRN: [id number]  : 1935  Room: [address] [place] [address]       Per OT Eval:      Referring Jose L Rodriguez MD     Evaluating OT: Cem Alvarenga OTR/L #[id number]     AM-PAC Daily Activity Raw Score:      Recommended Adaptive Equipment:  continue to assess  RECOMMENDING SPEECH CONSULT 20     Diagnosis:    1. Closed nondisplaced fracture of second cervical vertebra, unspecified fracture morphology, initial encounter ([facility]    2. Closed fracture of multiple ribs of both sides, initial encounter       Pertinent Medical History: hx of L shoulder fx, COPD, CAD, DM,      Precautions:  Falls, cervical precautions for C2 fx, aspen collar (soft collar ordered per trauma dr note, nsg ordered waiting for delivery), incontinent, TSM, bed/chair alarm, B rib fx's     Home Living: Pt lives at an [facility]. Prior Level of Function: mostly indep  with ADLs, assist with baths.  , assist  with IADLs; using ww for ambulation. Pt reported she has a recent hx of multiple falls at [facility]     Pain Level: Pt c/o shoulder pain this session; unable to quantify. Reinforced pain management/protection strategies.        Cognition: A&O: 2/4 (self and place); Follows 1 step directions with verbal cues & delayed processing. Pt emotional (tearful) throughout session - unable to verbalize reason.  Verbal consoling and encouragement provided.               Memory:  fair-              Sequencing:  fair              Problem solving:  fair-              Judgement/safety:  Poor+                 Functional Assessment:    Initial Eval Status  Date: 20 Treatment Status  Date:  20 STG/LTG  Frequency/duration  2-3x/week, 5-7 days   Feeding Stand by Assist  Min A Independent    Grooming Minimal Assist   Mod A  Washed face and B hands Modified [place]    UB Dressing Moderate Assist   Max A  Donned/doffed gown  Supervision    LB Dressing Maximal Assist  Dependent  B socks  Minimal Assist    Bathing Maximal Assist Max A  Simulated  Minimal Assist    Toileting Maximal Assist   Dependent  Including hygiene - incontinent of urine Minimal Assist    Bed Mobility  Supine to sit: Minimal Assist   Sit to supine: NT  Rolling: Max A  Sit>supine (utilizing logroll technique): Max A   Supine to sit: Modified [place]   Sit to supine: Modified [place]    Functional Transfers Sit to stand: Minimal Assist   Stand to sit: Minimal Assist   Stand pivot: Moderate Assist with ww Sit>stand chair: Max A  Stand>sit EOB: Mod A   Stand by Assist    Functional Mobility  (Ambulation) Mod A with ww 2-3 small side steps bed to chair  Pt reported fear of falling forward during mobility.   Mod A w/ w/w  Steps from chair>EOB w/ max cues for sequencing, attention and w/w management SBA with ww  Short functional distance   Balance Sitting:     Static:  SBA    Dynamic:min A  Standing: mod A Sitting:     Static:  SBA    Dynamic: Min A  Standing: Mod A><Max A w/ w/w     Activity Tolerance Poor for OOB activity Poor+  Noted fatigue and weakness w/ all function tasks Good for OOB ADLs   Visual/  Perceptual Glasses: yes             BUE  ROM/Strength/  Fine motor Coordination RUE: ROM grossly WFL     Strength: grossly 3+/5      Strength:  WFL     Coordination: WFL     LUE: ROM minimal shoulder, partial elbow ROM     Strength: NT      Strength: fair     Coordination: poor+ Noted L UE weakness during UB self-care tasks. Cues required to utilize UE while protecting ribs Increase LUE AROM to partial ROM for improved indep with           Comments: Upon arrival pt seated in chair (chair alarm on ). Pt agreeable to OT session this date. Prior to activity, educated/reinforced cervical precautions (soft collar on). Pt verbalized understanding however required cues to maintain during session.   Therapist facilitated self-care retraining including: UB self-care tasks (donned/doffed gown), seated grooming tasks (while seated in chair) and toilet hygiene (incontinent of urine) while educating/cuing pt on modified techniques within precautions, sequencing, attention, posture and safety. Also facilitated un/supported sitting balance, functional transfers (various surfaces-chair, EOB w/ max cues for safety/hand placement and sequencing), standing balance/tolerance tasks w/ w/w (addressing posture, balance and safety), steps from chair>EOB with w/w (w/ education and cuing on posture, w/w management, sequencing, body mechanics and safety. Pt required increased assist for w/w management during task) and bed mobility (sit>supine utilizing logroll technique, rolling w/ cues for precautions, body mechanics and sequencing). Skilled monitoring of HR, O2 sats and pts response to treatment. At end of session lying in bed (bed alarm on, TSM) all lines and tubes intact, call light within reach. · Pt has made limited progress towards set goals.    · Continue with current plan of care    Treatment Time In:13:21            Treatment Time Out: 13:38             Treatment Charges: Mins Units   Ther Ex  [id number]     Manual Therapy 27391     Thera Activities 22712 7 0   ADL/Home Mgt [id number] 10 1   Neuro Re-ed [id number]     Group Therapy      Orthotic manage/training  08840     Non-Billable Time     Total Timed Treatment [address] [place] [address], OTR/L #[id number]

## 2020-08-27 NOTE — DISCHARGE INSTR - COC
 Closed nondisplaced fracture of second cervical vertebra ([address] S12.101A    right 3rd-5th rib and left 9th rib fractures. S22.43XA    Pulmonary nodule, right- Incidental  R91.1    Liver cyst- Incidental  K76.89       Isolation/Infection:   Isolation          No Isolation        Patient Infection Status     None to display          Nurse Assessment:  Last Vital Signs: /66   Pulse 75   Temp 97.6 °F (36.4 °C) (Temporal)   Resp 16   Ht 5' 6\" (1.676 m)   Wt 150 lb (68 kg)   SpO2 94%   BMI 24.21 kg/m²     Last documented pain score (0-10 scale): Pain Level: 0  Last Weight:   Wt Readings from Last 1 Encounters:   08/23/20 150 lb (68 kg)     Mental Status:  disoriented and alert    IV Access:  - None    Nursing Mobility/ADLs:  Walking   Assisted  Transfer  Assisted  Bathing  Assisted  Dressing  Assisted  Toileting  Assisted  Feeding  Independent  Med Admin  Assisted  Med Delivery   whole    Wound Care Documentation and Therapy:        Elimination:  Continence:   · Bowel: No  · Bladder: No  Urinary Catheter: Removal Date 8/26/2020   Colostomy/Ileostomy/Ileal Conduit: No       Date of Last BM: 8/27/2020    Intake/Output Summary (Last 24 hours) at 8/27/2020 1159  Last data filed at 8/27/2020 1150  Gross per 24 hour   Intake 610 ml   Output --   Net 610 ml     I/O last 3 completed shifts: In: 360 [P.O.:360]  Out: -     Safety Concerns:     History of Falls (last 30 days) and At Risk for Falls    Impairments/Disabilities:      None    Nutrition Therapy:  Current Nutrition Therapy:   - Oral Diet:  Carb Control 4 carbs/meal (1800kcals/day)    Routes of Feeding: Oral  Liquids: No Restrictions  Daily Fluid Restriction: no  Last Modified Barium Swallow with Video (Video Swallowing Test): not done    Treatments at the Time of Hospital Discharge:   Respiratory Treatments: N/A  Oxygen Therapy:  is not on home oxygen therapy.   Ventilator:    - No ventilator support    Rehab Therapies: Physical Therapy and Occupational

## 2020-08-27 NOTE — PROGRESS NOTES
Neurosurg progress note  VITALS:  /66   Pulse 75   Temp 97.6 °F (36.4 °C) (Temporal)   Resp 16   Ht 5' 6\" (1.676 m)   Wt 150 lb (68 kg)   SpO2 94%   BMI 24.21 kg/m²   24HR INTAKE/OUTPUT:    Intake/Output Summary (Last 24 hours) at 8/27/2020 0754  Last data filed at 8/27/2020 0424  Gross per 24 hour   Intake 360 ml   Output --   Net 360 ml     Xr Pelvis (1-2 Views)    Result Date: 8/23/2020  Single view of the pelvis. There is diffuse bone demineralization. Degenerative spondylosis and facet arthropathy are identified in the lower lumbar spine. Osteophytosis and eburnation of sacroiliac joints and hips. Catheter overlies the lower pelvis. Prior internal fixation of proximal femur fracture bilaterally. Otherwise the regional soft tissue and osseous structures are unremarkable. No acute fracture is identified. Osteopenia. Degenerative disc disease. Prior internal fixation of proximal femur fracture bilaterally. Xr Shoulder Right (min 2 Views)    Result Date: 8/23/2020  EXAMINATION: THREE XRAY VIEWS OF THE RIGHT SHOULDER 8/23/2020 9:26 am COMPARISON: None. HISTORY: ORDERING SYSTEM PROVIDED HISTORY: fall TECHNOLOGIST PROVIDED HISTORY: Reason for exam:->fall FINDINGS: Glenohumeral joint is normally aligned. No evidence of acute fracture or dislocation. No abnormal periarticular calcifications. Moderate osteoarthrosis the AC joint. Visualized lung is unremarkable. No acute fractures or dislocations of right shoulder. Ct Head Wo Contrast    Result Date: 8/23/2020  EXAMINATION: CT OF THE HEAD WITHOUT CONTRAST  8/23/2020 9:23 am TECHNIQUE: CT of the head was performed without the administration of intravenous contrast. Dose modulation, iterative reconstruction, and/or weight based adjustment of the mA/kV was utilized to reduce the radiation dose to as low as reasonably achievable.  COMPARISON: CT head January 16, 2015 HISTORY: ORDERING SYSTEM PROVIDED HISTORY: fall TECHNOLOGIST PROVIDED HISTORY: Reason for exam:->fall Has a \"code stroke\" or \"stroke alert\" been called? ->No FINDINGS: BRAIN/VENTRICLES: There is no acute intracranial hemorrhage, mass effect or midline shift. No abnormal extra-axial fluid collection. The gray-white differentiation is maintained without evidence of an acute infarct. There is no evidence of hydrocephalus. ORBITS: The visualized portion of the orbits demonstrate no acute abnormality. SINUSES: Again seen is the right sphenoid sinus inspissated mucus. The visualized paranasal sinuses and mastoid air cells demonstrate no acute abnormality. SOFT TISSUES/SKULL:  No acute abnormality of the visualized skull or soft tissues. No acute intracranial abnormality. Right sphenoid sinus is aerated mucous unchanged. Ct Chest Wo Contrast    Result Date: 8/23/2020  EXAMINATION: CT OF THE CHEST WITHOUT CONTRAST 8/23/2020 9:23 am TECHNIQUE: CT of the chest was performed without the administration of intravenous contrast. Multiplanar reformatted images are provided for review. Dose modulation, iterative reconstruction, and/or weight based adjustment of the mA/kV was utilized to reduce the radiation dose to as low as reasonably achievable. COMPARISON: CT chest performed January 16, 2015 HISTORY: ORDERING SYSTEM PROVIDED HISTORY: Fall TECHNOLOGIST PROVIDED HISTORY: Reason for exam:->Fall FINDINGS: Mediastinum: Multiple grossly stable thyroid gland nodules, not significantly changed from prior examination. Follow-up as clinically indicated. Hypodensity along the posterior left lateral aspect of the cervical esophagus measuring up to 2.2 cm. No significant mediastinal or hilar lymphadenopathy. Multivessel coronary artery disease. Grossly stable dilation of the ascending thoracic aorta measuring up to 4.4 cm in diameter, previously 4.3 cm. Diffuse atherosclerotic disease of the visualized aorta.  Lungs/pleura: New spiculated nodule within the right upper lobe (axial image 20) measures 1.1 x 0. 8 cm. Bibasilar atelectasis. No pleural effusion or pneumothorax. The central airways are grossly patent. Upper Abdomen: Large cyst centered within the right hepatic lobe now demonstrates some dependently layering hyperdensity which could represent proteinaceous versus hemorrhagic content. The adrenal glands are normal. Soft Tissues/Bones: Diffuse osteopenia. Multilevel degenerative changes of the visualized spine. Mildly displaced fractures of the right lateral 3rd, 4th, and 5th ribs. Mildly displaced fracture of the posterolateral left 9th rib. .     1. Mildly displaced right 3rd-5th rib and left 9th rib fractures. 2. The spiculated nodule in the right upper lobe measuring up to 1.1 cm. Recommendations described below. 3. Multivessel coronary artery disease. Stable dilation of the ascending thoracic aorta measuring up to 4.4 cm, previously 4.3 cm. 4. Hypodensity posterior to the cervical esophagus measuring up to 2.2 cm. Finding could represent a Zenker's diverticulum. 5. Large cyst centered within the right hepatic lobe now demonstrates layering hyperdensity which could be related to proteinaceous versus hemorrhagic content. RECOMMENDATIONS: 11 mm solid suspicious pulmonary nodule within the upper lobe. Consider a non-contrast Chest CT at 3 months, a PET/CT, or tissue sampling. These guidelines do not apply to immunocompromised patients and patients with cancer. Follow up in patients with significant comorbidities as clinically warranted. For lung cancer screening, adhere to Lung-RADS guidelines. Reference: Radiology. 2017; 284(1):228-43. Ct Cervical Spine Wo Contrast    Result Date: 8/23/2020  EXAMINATION: CT OF THE CERVICAL SPINE WITHOUT CONTRAST 8/23/2020 9:23 am TECHNIQUE: CT of the cervical spine was performed without the administration of intravenous contrast. Multiplanar reformatted images are provided for review.  Dose modulation, iterative reconstruction, and/or weight based adjustment of the mA/kV was utilized to reduce the radiation dose to as low as reasonably achievable. COMPARISON: None. HISTORY: ORDERING SYSTEM PROVIDED HISTORY: fall TECHNOLOGIST PROVIDED HISTORY: Reason for exam:->fall FINDINGS: BONES/ALIGNMENT: There is a nondisplaced fracture involving the left superior articular facet, and appears to extend into the inferior articular facet on the left. DEGENERATIVE CHANGES: Multilevel degenerative changes throughout the cervical spine. SOFT TISSUES: There is no prevertebral soft tissue swelling. There is a 9 mm spiculated lesion in the right lung apex. Nondisplaced fracture left articular facet of C2. No compression deformities. Ct Thoracic Spine Wo Contrast    Result Date: 2020  Patient Mrn: [id number] : 1935 Age:  81 years Gender: Female Order Date: 2020 6:20 PM Exam: CT THORACIC SPINE WO CONTRAST NUMBER OF IMAGES:  [id number] INDICATION: Trauma, advanced age COMPARISON: Thoracic CT images [id number] TECHNIQUE: Helical images were extended through the thoracic spine without contrast, and reformatted at bone window and soft tissue window settings in all 3 imaging planes. Low-dose CT  acquisition technique included one of following options; 1 . Automated exposure control, 2. Adjustment of MA and or KV according to patient's size or 3. Use of iterative reconstruction. Findings: Thoracic vertebrae show diffuse demineralization, and flowing ventral osteophytes suggests a spectrum of DISH-type change. There is a kyphotic curvature with the apex at about the T7 level. The central spinal canal and foramina appear patent, and the posterior elements appear intact throughout. Coronal images show no lateral deviation of the thoracic spine. Axial images show atherosclerotic vertebral arterial calcifications, but the medial ribs and thoracic vertebrae appear intact throughout.  Standard CT imaging included more the anterior and lateral chest wall, and reported fractures of the right third through fifth and left ninth ribs. There is ectasia and intimal calcification of the thoracic aorta, and coronary artery calcifications are present. Lung window images show a posteromedial right pulmonary apical irregularly marginated nodular density of 13 mm diameter which is not further characterized on this study. It could be evaluated by PET CT imaging, short-term interval 3 month chest CT follow-up, or less desirable in this age range, percutaneous biopsy. There are small pleural effusions with basilar atelectasis. There is no evidence of chest wall trauma, particularly, no evidence of trauma involving the thoracic spine. Bilateral rib fractures documented on standard CT imaging were not included in the spinal images. There is a 13 mm diameter mass in the posterior medial right pulmonary apical region, which could be further characterized with PET CT imaging if it has not been previously evaluated. 3 month CT chest follow-up evaluation may document stability in this patient of advanced age, it PET CT imaging is not feasible. Dependent atelectasis and pleural thickening in both lungs is noted. Ct Lumbar Spine Wo Contrast    Result Date: 8/23/2020  This examination and all examinations utilizing ionizing radiation at this facility are done so according to the ALARA (as low as reasonably achievable) principal for radiation dose reduction. COMPARISON: None. TECHNIQUE: Axial, sagittal, and coronal computed tomography of the lumbar spine was performed without contrast. FINDINGS: There is no evidence of acute displaced cortical disruption or spondylolisthesis. The vertebral bodies and disks are normal in vertical dimension and alignment is maintained. There is diffuse degenerative spondylosis and facet arthropathy. Circumferential disc bulge, ligamentum flavum hypertrophy and degenerative facet arthropathy result in central spinal canal stenosis at L2-3, L3-4 and L4-5.  Posterior lateral disc and times per day    sennosides-docusate sodium  1 tablet Oral BID    heparin (porcine)  5,000 Units Subcutaneous 3 times per day    lidocaine  2 patch Transdermal Daily    methocarbamol  750 mg Oral TID     Soft collar in place, awakens and follows commands  MRI results noted  Assessment:  Patient Active Problem List   Diagnosis    Fracture of left humerus    Hypercholesterolemia    Systolic hypertension    History of coronary artery bypass graft    COPD (chronic obstructive pulmonary disease) ([address]    Non-insulin dependent type 2 diabetes mellitus ([address]    Fall from standing    Closed nondisplaced fracture of second cervical vertebra ([address]    right 3rd-5th rib and left 9th rib fractures.     Pulmonary nodule, right- Incidental     Liver cyst- Incidental      Plan:continue soft collar when in bed, aspen collar when OOB, follow up office with video visit two weeks OK for discharge  Sherman Elizabeth M.D.

## 2020-08-27 NOTE — PROGRESS NOTES
TRAUMA SURGERY  ATTENDING PROGRESS NOTE      CC: fall     S:  No issues pain is controlled     O:   @/66   Pulse 75   Temp 97.6 °F (36.4 °C) (Temporal)   Resp 16   Ht 5' 6\" (1.676 m)   Wt 150 lb (68 kg)   SpO2 94%   BMI 24.21 kg/m² @    Gen - no apparent distress   Neuro - Awake, alert, attentive    HEENT - PERRL 3mm,   Lungs - non labored, BS clear + chest wall tenderness    Heart - RR   Abdomen - snt   Spine -   c spine tenderness mild/moderate    Ext- nvi rom wnl     A/P: 80 yo female s/p mechanical fall with C2 fx and b/l rib fx       Active Problems:    Fall from standing    Closed nondisplaced fracture of second cervical vertebra (HCC)    right 3rd-5th rib and left 9th rib fractures. Pulmonary nodule, right- Incidental     Liver cyst- Incidental   Resolved Problems:    * No resolved hospital problems. *      - C2 fx c collar, NS following, given low velocity mechanism elevated Cr, will forgo screening cta neck.  Soft collar in bed and aspen when out of bed per NS    - Rib fx smi deep breathing pain control   - Home meds, DM SSI,metformin, carb cont diet  - PTOT placement     DVT prophylaxis: SCD, heparin tid     Zoie Gary MD FACS

## 2020-08-29 ENCOUNTER — APPOINTMENT (OUTPATIENT)
Dept: GENERAL RADIOLOGY | Age: 85
DRG: 871 | End: 2020-08-29
Payer: COMMERCIAL

## 2020-08-29 ENCOUNTER — ANESTHESIA EVENT (OUTPATIENT)
Dept: ENDOSCOPY | Age: 85
DRG: 871 | End: 2020-08-29
Payer: COMMERCIAL

## 2020-08-29 ENCOUNTER — APPOINTMENT (OUTPATIENT)
Dept: CT IMAGING | Age: 85
DRG: 871 | End: 2020-08-29
Payer: COMMERCIAL

## 2020-08-29 ENCOUNTER — HOSPITAL ENCOUNTER (INPATIENT)
Age: 85
LOS: 5 days | Discharge: NURSING, REHAB OR LONG-TERM CARE | DRG: 871 | End: 2020-09-03
Attending: EMERGENCY MEDICINE | Admitting: INTERNAL MEDICINE
Payer: COMMERCIAL

## 2020-08-29 PROBLEM — K92.2 GI BLEED: Status: ACTIVE | Noted: 2020-08-29

## 2020-08-29 LAB
ABO/RH: NORMAL
ACANTHOCYTES: ABNORMAL
ALBUMIN SERPL-MCNC: 3.4 G/DL (ref 3.5–5.2)
ALP BLD-CCNC: 136 U/L (ref 35–104)
ALT SERPL-CCNC: 18 U/L (ref 0–32)
ANION GAP SERPL CALCULATED.3IONS-SCNC: 15 MMOL/L (ref 7–16)
ANION GAP SERPL CALCULATED.3IONS-SCNC: 19 MMOL/L (ref 7–16)
ANION GAP SERPL CALCULATED.3IONS-SCNC: 20 MMOL/L (ref 7–16)
ANION GAP SERPL CALCULATED.3IONS-SCNC: 23 MMOL/L (ref 7–16)
ANION GAP SERPL CALCULATED.3IONS-SCNC: 30 MMOL/L (ref 7–16)
ANISOCYTOSIS: ABNORMAL
ANTIBODY SCREEN: NORMAL
AST SERPL-CCNC: 34 U/L (ref 0–31)
BASOPHILS ABSOLUTE: 0 E9/L (ref 0–0.2)
BASOPHILS ABSOLUTE: 0.22 E9/L (ref 0–0.2)
BASOPHILS RELATIVE PERCENT: 0.1 % (ref 0–2)
BASOPHILS RELATIVE PERCENT: 0.9 % (ref 0–2)
BETA-HYDROXYBUTYRATE: 0.52 MMOL/L (ref 0.02–0.27)
BILIRUB SERPL-MCNC: 0.6 MG/DL (ref 0–1.2)
BUN BLDV-MCNC: 105 MG/DL (ref 8–23)
BUN BLDV-MCNC: 109 MG/DL (ref 8–23)
BUN BLDV-MCNC: 110 MG/DL (ref 8–23)
BUN BLDV-MCNC: 110 MG/DL (ref 8–23)
BUN BLDV-MCNC: 119 MG/DL (ref 8–23)
BURR CELLS: ABNORMAL
BURR CELLS: ABNORMAL
CALCIUM SERPL-MCNC: 8.3 MG/DL (ref 8.6–10.2)
CALCIUM SERPL-MCNC: 8.4 MG/DL (ref 8.6–10.2)
CALCIUM SERPL-MCNC: 8.6 MG/DL (ref 8.6–10.2)
CALCIUM SERPL-MCNC: 8.9 MG/DL (ref 8.6–10.2)
CALCIUM SERPL-MCNC: 9.9 MG/DL (ref 8.6–10.2)
CHLORIDE BLD-SCNC: 100 MMOL/L (ref 98–107)
CHLORIDE BLD-SCNC: 101 MMOL/L (ref 98–107)
CHLORIDE BLD-SCNC: 104 MMOL/L (ref 98–107)
CHLORIDE BLD-SCNC: 89 MMOL/L (ref 98–107)
CHLORIDE BLD-SCNC: 99 MMOL/L (ref 98–107)
CO2: 10 MMOL/L (ref 22–29)
CO2: 12 MMOL/L (ref 22–29)
CO2: 12 MMOL/L (ref 22–29)
CO2: 14 MMOL/L (ref 22–29)
CO2: 9 MMOL/L (ref 22–29)
CREAT SERPL-MCNC: 2.2 MG/DL (ref 0.5–1)
CREAT SERPL-MCNC: 2.3 MG/DL (ref 0.5–1)
CREATININE URINE: 58 MG/DL (ref 29–226)
EKG ATRIAL RATE: 98 BPM
EKG P AXIS: 51 DEGREES
EKG P-R INTERVAL: 168 MS
EKG Q-T INTERVAL: 374 MS
EKG QRS DURATION: 96 MS
EKG QTC CALCULATION (BAZETT): 477 MS
EKG R AXIS: 17 DEGREES
EKG T AXIS: 97 DEGREES
EKG VENTRICULAR RATE: 98 BPM
EOSINOPHILS ABSOLUTE: 0 E9/L (ref 0.05–0.5)
EOSINOPHILS ABSOLUTE: 0 E9/L (ref 0.05–0.5)
EOSINOPHILS RELATIVE PERCENT: 0 % (ref 0–6)
EOSINOPHILS RELATIVE PERCENT: 0 % (ref 0–6)
FERRITIN: 183 NG/ML
FOLATE: 6.9 NG/ML (ref 4.8–24.2)
GFR AFRICAN AMERICAN: 24
GFR AFRICAN AMERICAN: 26
GFR NON-AFRICAN AMERICAN: 20 ML/MIN/1.73
GFR NON-AFRICAN AMERICAN: 21 ML/MIN/1.73
GLUCOSE BLD-MCNC: 209 MG/DL (ref 74–99)
GLUCOSE BLD-MCNC: 220 MG/DL (ref 74–99)
GLUCOSE BLD-MCNC: 226 MG/DL (ref 74–99)
GLUCOSE BLD-MCNC: 246 MG/DL (ref 74–99)
GLUCOSE BLD-MCNC: 275 MG/DL (ref 74–99)
HBA1C MFR BLD: 6.3 % (ref 4–5.6)
HCT VFR BLD CALC: 20.8 % (ref 34–48)
HCT VFR BLD CALC: 21.6 % (ref 34–48)
HCT VFR BLD CALC: 30.4 % (ref 34–48)
HEMOGLOBIN: 10.2 G/DL (ref 11.5–15.5)
HEMOGLOBIN: 7 G/DL (ref 11.5–15.5)
HEMOGLOBIN: 7.3 G/DL (ref 11.5–15.5)
HYPOCHROMIA: ABNORMAL
IRON SATURATION: 30 % (ref 15–50)
IRON: 73 MCG/DL (ref 37–145)
LACTIC ACID: 1.1 MMOL/L (ref 0.5–2.2)
LACTIC ACID: 2.9 MMOL/L (ref 0.5–2.2)
LACTIC ACID: 6.4 MMOL/L (ref 0.5–2.2)
LACTIC ACID: 7.3 MMOL/L (ref 0.5–2.2)
LYMPHOCYTES ABSOLUTE: 0.55 E9/L (ref 1.5–4)
LYMPHOCYTES ABSOLUTE: 0.99 E9/L (ref 1.5–4)
LYMPHOCYTES RELATIVE PERCENT: 1.7 % (ref 20–42)
LYMPHOCYTES RELATIVE PERCENT: 4.4 % (ref 20–42)
MCH RBC QN AUTO: 30.6 PG (ref 26–35)
MCH RBC QN AUTO: 30.8 PG (ref 26–35)
MCHC RBC AUTO-ENTMCNC: 33.6 % (ref 32–34.5)
MCHC RBC AUTO-ENTMCNC: 33.7 % (ref 32–34.5)
MCV RBC AUTO: 91.3 FL (ref 80–99.9)
MCV RBC AUTO: 91.6 FL (ref 80–99.9)
METER GLUCOSE: 207 MG/DL (ref 74–99)
METER GLUCOSE: 222 MG/DL (ref 74–99)
METER GLUCOSE: 294 MG/DL (ref 74–99)
MONOCYTES ABSOLUTE: 0.25 E9/L (ref 0.1–0.95)
MONOCYTES ABSOLUTE: 1.11 E9/L (ref 0.1–0.95)
MONOCYTES RELATIVE PERCENT: 0.9 % (ref 2–12)
MONOCYTES RELATIVE PERCENT: 4.3 % (ref 2–12)
NEUTROPHILS ABSOLUTE: 23.31 E9/L (ref 1.8–7.3)
NEUTROPHILS ABSOLUTE: 26.04 E9/L (ref 1.8–7.3)
NEUTROPHILS RELATIVE PERCENT: 93.9 % (ref 43–80)
NEUTROPHILS RELATIVE PERCENT: 93.9 % (ref 43–80)
OVALOCYTES: ABNORMAL
OVALOCYTES: ABNORMAL
PDW BLD-RTO: 15 FL (ref 11.5–15)
PDW BLD-RTO: 15.3 FL (ref 11.5–15)
PLATELET # BLD: 218 E9/L (ref 130–450)
PLATELET # BLD: 311 E9/L (ref 130–450)
PMV BLD AUTO: 9.1 FL (ref 7–12)
PMV BLD AUTO: 9.5 FL (ref 7–12)
POIKILOCYTES: ABNORMAL
POIKILOCYTES: ABNORMAL
POLYCHROMASIA: ABNORMAL
POLYCHROMASIA: ABNORMAL
POTASSIUM SERPL-SCNC: 4.7 MMOL/L (ref 3.5–5)
POTASSIUM SERPL-SCNC: 4.8 MMOL/L (ref 3.5–5)
POTASSIUM SERPL-SCNC: 4.9 MMOL/L (ref 3.5–5)
POTASSIUM SERPL-SCNC: 5.1 MMOL/L (ref 3.5–5)
POTASSIUM SERPL-SCNC: 6.4 MMOL/L (ref 3.5–5)
PROCALCITONIN: 0.31 NG/ML (ref 0–0.08)
RBC # BLD: 2.27 E12/L (ref 3.5–5.5)
RBC # BLD: 3.33 E12/L (ref 3.5–5.5)
SODIUM BLD-SCNC: 128 MMOL/L (ref 132–146)
SODIUM BLD-SCNC: 132 MMOL/L (ref 132–146)
SODIUM BLD-SCNC: 133 MMOL/L (ref 132–146)
SODIUM URINE: <20 MMOL/L
TOTAL IRON BINDING CAPACITY: 243 MCG/DL (ref 250–450)
TOTAL PROTEIN: 7.7 G/DL (ref 6.4–8.3)
TROPONIN: <0.01 NG/ML (ref 0–0.03)
UREA NITROGEN, UR: 886 MG/DL (ref 800–1666)
VITAMIN B-12: 894 PG/ML (ref 211–946)
WBC # BLD: 24.8 E9/L (ref 4.5–11.5)
WBC # BLD: 27.7 E9/L (ref 4.5–11.5)

## 2020-08-29 PROCEDURE — 93005 ELECTROCARDIOGRAM TRACING: CPT | Performed by: EMERGENCY MEDICINE

## 2020-08-29 PROCEDURE — C9113 INJ PANTOPRAZOLE SODIUM, VIA: HCPCS | Performed by: HOSPITALIST

## 2020-08-29 PROCEDURE — 99223 1ST HOSP IP/OBS HIGH 75: CPT | Performed by: SURGERY

## 2020-08-29 PROCEDURE — 82962 GLUCOSE BLOOD TEST: CPT

## 2020-08-29 PROCEDURE — 83540 ASSAY OF IRON: CPT

## 2020-08-29 PROCEDURE — 36430 TRANSFUSION BLD/BLD COMPNT: CPT

## 2020-08-29 PROCEDURE — 85025 COMPLETE CBC W/AUTO DIFF WBC: CPT

## 2020-08-29 PROCEDURE — 80048 BASIC METABOLIC PNL TOTAL CA: CPT

## 2020-08-29 PROCEDURE — 2500000003 HC RX 250 WO HCPCS: Performed by: HOSPITALIST

## 2020-08-29 PROCEDURE — 6360000002 HC RX W HCPCS: Performed by: ORTHOPAEDIC SURGERY

## 2020-08-29 PROCEDURE — 93306 TTE W/DOPPLER COMPLETE: CPT

## 2020-08-29 PROCEDURE — 6360000002 HC RX W HCPCS: Performed by: EMERGENCY MEDICINE

## 2020-08-29 PROCEDURE — 82010 KETONE BODYS QUAN: CPT

## 2020-08-29 PROCEDURE — P9016 RBC LEUKOCYTES REDUCED: HCPCS

## 2020-08-29 PROCEDURE — 82570 ASSAY OF URINE CREATININE: CPT

## 2020-08-29 PROCEDURE — C9113 INJ PANTOPRAZOLE SODIUM, VIA: HCPCS | Performed by: EMERGENCY MEDICINE

## 2020-08-29 PROCEDURE — 99284 EMERGENCY DEPT VISIT MOD MDM: CPT

## 2020-08-29 PROCEDURE — 84540 ASSAY OF URINE/UREA-N: CPT

## 2020-08-29 PROCEDURE — 82607 VITAMIN B-12: CPT

## 2020-08-29 PROCEDURE — 86901 BLOOD TYPING SEROLOGIC RH(D): CPT

## 2020-08-29 PROCEDURE — 6370000000 HC RX 637 (ALT 250 FOR IP): Performed by: INTERNAL MEDICINE

## 2020-08-29 PROCEDURE — 84145 PROCALCITONIN (PCT): CPT

## 2020-08-29 PROCEDURE — 87040 BLOOD CULTURE FOR BACTERIA: CPT

## 2020-08-29 PROCEDURE — 86900 BLOOD TYPING SEROLOGIC ABO: CPT

## 2020-08-29 PROCEDURE — 2500000003 HC RX 250 WO HCPCS: Performed by: INTERNAL MEDICINE

## 2020-08-29 PROCEDURE — 2580000003 HC RX 258: Performed by: INTERNAL MEDICINE

## 2020-08-29 PROCEDURE — 85014 HEMATOCRIT: CPT

## 2020-08-29 PROCEDURE — 84484 ASSAY OF TROPONIN QUANT: CPT

## 2020-08-29 PROCEDURE — 82746 ASSAY OF FOLIC ACID SERUM: CPT

## 2020-08-29 PROCEDURE — 93010 ELECTROCARDIOGRAM REPORT: CPT | Performed by: INTERNAL MEDICINE

## 2020-08-29 PROCEDURE — 80053 COMPREHEN METABOLIC PANEL: CPT

## 2020-08-29 PROCEDURE — 71045 X-RAY EXAM CHEST 1 VIEW: CPT

## 2020-08-29 PROCEDURE — 86850 RBC ANTIBODY SCREEN: CPT

## 2020-08-29 PROCEDURE — 2000000000 HC ICU R&B

## 2020-08-29 PROCEDURE — 6370000000 HC RX 637 (ALT 250 FOR IP): Performed by: NURSE PRACTITIONER

## 2020-08-29 PROCEDURE — 2580000003 HC RX 258: Performed by: HOSPITALIST

## 2020-08-29 PROCEDURE — 83550 IRON BINDING TEST: CPT

## 2020-08-29 PROCEDURE — 82728 ASSAY OF FERRITIN: CPT

## 2020-08-29 PROCEDURE — 2700000000 HC OXYGEN THERAPY PER DAY

## 2020-08-29 PROCEDURE — 74176 CT ABD & PELVIS W/O CONTRAST: CPT

## 2020-08-29 PROCEDURE — 99285 EMERGENCY DEPT VISIT HI MDM: CPT

## 2020-08-29 PROCEDURE — 2580000003 HC RX 258: Performed by: ORTHOPAEDIC SURGERY

## 2020-08-29 PROCEDURE — 36415 COLL VENOUS BLD VENIPUNCTURE: CPT

## 2020-08-29 PROCEDURE — 82272 OCCULT BLD FECES 1-3 TESTS: CPT

## 2020-08-29 PROCEDURE — 85018 HEMOGLOBIN: CPT

## 2020-08-29 PROCEDURE — 6360000002 HC RX W HCPCS: Performed by: HOSPITALIST

## 2020-08-29 PROCEDURE — 2500000003 HC RX 250 WO HCPCS: Performed by: ORTHOPAEDIC SURGERY

## 2020-08-29 PROCEDURE — 86923 COMPATIBILITY TEST ELECTRIC: CPT

## 2020-08-29 PROCEDURE — 2580000003 HC RX 258: Performed by: EMERGENCY MEDICINE

## 2020-08-29 PROCEDURE — 36592 COLLECT BLOOD FROM PICC: CPT

## 2020-08-29 PROCEDURE — 6360000002 HC RX W HCPCS: Performed by: INTERNAL MEDICINE

## 2020-08-29 PROCEDURE — 84300 ASSAY OF URINE SODIUM: CPT

## 2020-08-29 PROCEDURE — 96374 THER/PROPH/DIAG INJ IV PUSH: CPT

## 2020-08-29 PROCEDURE — 83605 ASSAY OF LACTIC ACID: CPT

## 2020-08-29 PROCEDURE — 83036 HEMOGLOBIN GLYCOSYLATED A1C: CPT

## 2020-08-29 RX ORDER — MAGNESIUM SULFATE 1 G/100ML
1 INJECTION INTRAVENOUS PRN
Status: DISCONTINUED | OUTPATIENT
Start: 2020-08-29 | End: 2020-09-03 | Stop reason: HOSPADM

## 2020-08-29 RX ORDER — SODIUM CHLORIDE 0.9 % (FLUSH) 0.9 %
10 SYRINGE (ML) INJECTION PRN
Status: DISCONTINUED | OUTPATIENT
Start: 2020-08-29 | End: 2020-09-03 | Stop reason: HOSPADM

## 2020-08-29 RX ORDER — POTASSIUM CHLORIDE 7.45 MG/ML
10 INJECTION INTRAVENOUS PRN
Status: DISCONTINUED | OUTPATIENT
Start: 2020-08-29 | End: 2020-09-03 | Stop reason: HOSPADM

## 2020-08-29 RX ORDER — BUSPIRONE HYDROCHLORIDE 5 MG/1
5 TABLET ORAL 2 TIMES DAILY
COMMUNITY

## 2020-08-29 RX ORDER — NICOTINE POLACRILEX 4 MG
15 LOZENGE BUCCAL PRN
Status: DISCONTINUED | OUTPATIENT
Start: 2020-08-29 | End: 2020-09-03 | Stop reason: HOSPADM

## 2020-08-29 RX ORDER — 0.9 % SODIUM CHLORIDE 0.9 %
1000 INTRAVENOUS SOLUTION INTRAVENOUS ONCE
Status: COMPLETED | OUTPATIENT
Start: 2020-08-29 | End: 2020-08-29

## 2020-08-29 RX ORDER — LIDOCAINE HYDROCHLORIDE 10 MG/ML
5 INJECTION, SOLUTION EPIDURAL; INFILTRATION; INTRACAUDAL; PERINEURAL ONCE
Status: COMPLETED | OUTPATIENT
Start: 2020-08-29 | End: 2020-08-29

## 2020-08-29 RX ORDER — METRONIDAZOLE 500 MG/1
500 TABLET ORAL ONCE
Status: DISCONTINUED | OUTPATIENT
Start: 2020-08-29 | End: 2020-08-29

## 2020-08-29 RX ORDER — PANTOPRAZOLE SODIUM 40 MG/10ML
80 INJECTION, POWDER, LYOPHILIZED, FOR SOLUTION INTRAVENOUS ONCE
Status: COMPLETED | OUTPATIENT
Start: 2020-08-29 | End: 2020-08-29

## 2020-08-29 RX ORDER — HEPARIN SODIUM (PORCINE) LOCK FLUSH IV SOLN 100 UNIT/ML 100 UNIT/ML
3 SOLUTION INTRAVENOUS EVERY 12 HOURS SCHEDULED
Status: DISCONTINUED | OUTPATIENT
Start: 2020-08-29 | End: 2020-09-03 | Stop reason: HOSPADM

## 2020-08-29 RX ORDER — FENTANYL CITRATE 50 UG/ML
25 INJECTION, SOLUTION INTRAMUSCULAR; INTRAVENOUS
Status: DISCONTINUED | OUTPATIENT
Start: 2020-08-29 | End: 2020-09-03 | Stop reason: HOSPADM

## 2020-08-29 RX ORDER — DEXTROSE MONOHYDRATE 25 G/50ML
12.5 INJECTION, SOLUTION INTRAVENOUS PRN
Status: DISCONTINUED | OUTPATIENT
Start: 2020-08-29 | End: 2020-09-03 | Stop reason: HOSPADM

## 2020-08-29 RX ORDER — HEPARIN SODIUM (PORCINE) LOCK FLUSH IV SOLN 100 UNIT/ML 100 UNIT/ML
3 SOLUTION INTRAVENOUS PRN
Status: DISCONTINUED | OUTPATIENT
Start: 2020-08-29 | End: 2020-09-03 | Stop reason: HOSPADM

## 2020-08-29 RX ORDER — 0.9 % SODIUM CHLORIDE 0.9 %
20 INTRAVENOUS SOLUTION INTRAVENOUS ONCE
Status: DISCONTINUED | OUTPATIENT
Start: 2020-08-29 | End: 2020-09-03 | Stop reason: HOSPADM

## 2020-08-29 RX ORDER — SODIUM CHLORIDE 9 MG/ML
INJECTION, SOLUTION INTRAVENOUS CONTINUOUS
Status: DISCONTINUED | OUTPATIENT
Start: 2020-08-29 | End: 2020-08-29

## 2020-08-29 RX ORDER — ACETAMINOPHEN 325 MG/1
650 TABLET ORAL EVERY 6 HOURS PRN
Status: DISCONTINUED | OUTPATIENT
Start: 2020-08-29 | End: 2020-09-03 | Stop reason: HOSPADM

## 2020-08-29 RX ORDER — DEXTROSE MONOHYDRATE 50 MG/ML
100 INJECTION, SOLUTION INTRAVENOUS PRN
Status: DISCONTINUED | OUTPATIENT
Start: 2020-08-29 | End: 2020-09-03 | Stop reason: HOSPADM

## 2020-08-29 RX ORDER — 0.9 % SODIUM CHLORIDE 0.9 %
20 INTRAVENOUS SOLUTION INTRAVENOUS ONCE
Status: COMPLETED | OUTPATIENT
Start: 2020-08-29 | End: 2020-08-29

## 2020-08-29 RX ORDER — INSULIN GLARGINE 100 [IU]/ML
20 INJECTION, SOLUTION SUBCUTANEOUS NIGHTLY
Status: DISCONTINUED | OUTPATIENT
Start: 2020-08-29 | End: 2020-09-03 | Stop reason: HOSPADM

## 2020-08-29 RX ADMIN — LIDOCAINE HYDROCHLORIDE 5 ML: 10 INJECTION, SOLUTION EPIDURAL; INFILTRATION; INTRACAUDAL at 18:31

## 2020-08-29 RX ADMIN — INSULIN LISPRO 2 UNITS: 100 INJECTION, SOLUTION INTRAVENOUS; SUBCUTANEOUS at 20:48

## 2020-08-29 RX ADMIN — METRONIDAZOLE 500 MG: 500 INJECTION, SOLUTION INTRAVENOUS at 05:09

## 2020-08-29 RX ADMIN — SODIUM CHLORIDE 8 MG/HR: 9 INJECTION, SOLUTION INTRAVENOUS at 12:12

## 2020-08-29 RX ADMIN — INSULIN LISPRO 6 UNITS: 100 INJECTION, SOLUTION INTRAVENOUS; SUBCUTANEOUS at 12:02

## 2020-08-29 RX ADMIN — SODIUM CHLORIDE 20 ML: 9 INJECTION, SOLUTION INTRAVENOUS at 20:43

## 2020-08-29 RX ADMIN — PANTOPRAZOLE SODIUM 80 MG: 40 INJECTION, POWDER, FOR SOLUTION INTRAVENOUS at 03:50

## 2020-08-29 RX ADMIN — Medication 300 UNITS: at 20:47

## 2020-08-29 RX ADMIN — SODIUM CHLORIDE 1000 ML: 9 INJECTION, SOLUTION INTRAVENOUS at 03:52

## 2020-08-29 RX ADMIN — ACETAMINOPHEN 650 MG: 325 TABLET, FILM COATED ORAL at 10:55

## 2020-08-29 RX ADMIN — SODIUM BICARBONATE: 84 INJECTION, SOLUTION INTRAVENOUS at 17:37

## 2020-08-29 RX ADMIN — INSULIN GLARGINE 20 UNITS: 100 INJECTION, SOLUTION SUBCUTANEOUS at 20:47

## 2020-08-29 RX ADMIN — WATER 1 G: 1 INJECTION INTRAMUSCULAR; INTRAVENOUS; SUBCUTANEOUS at 05:06

## 2020-08-29 RX ADMIN — SODIUM CHLORIDE 1000 ML: 9 INJECTION, SOLUTION INTRAVENOUS at 05:15

## 2020-08-29 RX ADMIN — SODIUM CHLORIDE 8 MG/HR: 9 INJECTION, SOLUTION INTRAVENOUS at 20:42

## 2020-08-29 RX ADMIN — METRONIDAZOLE 500 MG: 500 INJECTION, SOLUTION INTRAVENOUS at 19:55

## 2020-08-29 RX ADMIN — SODIUM CHLORIDE: 9 INJECTION, SOLUTION INTRAVENOUS at 11:53

## 2020-08-29 ASSESSMENT — ENCOUNTER SYMPTOMS
ABDOMINAL DISTENTION: 0
SHORTNESS OF BREATH: 0
ABDOMINAL PAIN: 0
RHINORRHEA: 0
DIARRHEA: 1
ALLERGIC/IMMUNOLOGIC NEGATIVE: 1
NAUSEA: 0
COUGH: 0
VOMITING: 0
EYES NEGATIVE: 1
GASTROINTESTINAL NEGATIVE: 1
BACK PAIN: 0
BLOOD IN STOOL: 1
RESPIRATORY NEGATIVE: 1

## 2020-08-29 ASSESSMENT — PAIN SCALES - GENERAL
PAINLEVEL_OUTOF10: 6
PAINLEVEL_OUTOF10: 0
PAINLEVEL_OUTOF10: 3

## 2020-08-29 NOTE — CONSULTS
and anion gap of 30 on presentation. CT scan of the abdomen and pelvis was performed without contrast revealing 3.5 cm fusiform infrarenal abdominal aneurysm as well as liver cystic lesion. GI tract is reported as normal distal esophagus stomach and duodenum without apparent bowel wall thickening or obstruction. No uppercase colonic diverticulosis. Chest x-ray is reported as no evidence of acute findings. Information sources:   -Patient  -medical record  -health care team    PMHx:  Past Medical History:   Diagnosis Date    CAD (coronary artery disease)     COPD [facility]ch[facility] obstructive [facility] disease) [facility]     early stage    Diabetes mellitus ([facility]     Hyperlipidemia     Hypertension        PSHx:  Past Surgical History:   Procedure Laterality Date    ACHILLES TENDON SURGERY Left     CARDIAC SURGERY      COLONOSCOPY      CORONARY ARTERY BYPASS GRAFT  2013    EYE SURGERY Right     cataract    HYSTERECTOMY      OTHER SURGICAL HISTORY Right 14    DeQuervains tendonitis thumb       Meds:  Current Facility-Administered Medications   Medication Dose Route Frequency Provider Last Rate Last Dose    0.9 % sodium chloride bolus  20 mL Intravenous Once Zo Cami, DO        acetaminophen (TYLENOL) tablet 650 mg  650 mg Oral Q6H PRN Wendelyn Glimpse Bisel, DO   650 mg at 20 1055       SocHx:  Social History     Socioeconomic History    Marital status:       Spouse name: Not on file    Number of children: Not on file    Years of education: Not on file    Highest education level: Not on file   Occupational History    Not on file   Social Needs    Financial resource strain: Not on file    Food insecurity     Worry: Not on file     Inability: Not on file    Transportation needs     Medical: Not on file     Non-medical: Not on file   Tobacco Use    Smoking status: Former Smoker     Years: 30.00     Last attempt to quit: 4/3/2013     Years since quittin.4    Smokeless tobacco: Never Used   Substance and Sexual Activity    Alcohol use: No    Drug use: Never    Sexual activity: Not Currently   Lifestyle    Physical activity     Days per week: Not on file     Minutes per session: Not on file    Stress: Not on file   Relationships    Social connections     Talks on phone: Not on file     Gets together: Not on file     Attends Gnosticism service: Not on file     Active member of club or organization: Not on file     Attends meetings of clubs or organizations: Not on file     Relationship status: Not on file    Intimate partner violence     Fear of current or ex partner: Not on file     Emotionally abused: Not on file     Physically abused: Not on file     Forced sexual activity: Not on file   Other Topics Concern    Not on file   Social History Narrative    Not on file       FamHx:History reviewed. No pertinent family history. Allergy:  Allergies   Allergen Reactions    Naproxen Other (See Comments)     GI bleed    Nsaids Nausea And Vomiting    Vicodin [Hydrocodone-Acetaminophen] Nausea And Vomiting       No flowsheet data found. ROS: As described in the HPI and in addition is negative upon detailed review of systems or unobtainable unless otherwise stated in this dictation. PE:  BP (!) 97/50   Pulse 95   Temp 97.4 °F (36.3 °C) (Temporal)   Resp 21   Ht 5' 6\" (1.676 m)   Wt 150 lb (68 kg)   SpO2 97%   BMI 24.21 kg/m²     Gen.: NAD/elderly  female  Head: Atraumatic/normocephalic  Eyes: Anicteric sclerae/no conjunctival erythema  ENT: Moist oral mucosa/dentures. Oropharynx unremarkable. Neck: Trachea midline/no JVD  Chest: CTA B/symmetrical excursions  Cor: Appears regular without gallop  Abd.: Soft and obese. Appears nontender. BS + hypoactive  Extr.:  BLE with some edema. Muscles: Decreased tone and bulk throughout, consistent with age and condition  Skin: Warm and dry with cool extremities.   Peripheral pulses equal        DATA:     Lab Results Component Value Date    WBC 24.8 08/29/2020    RBC 3.33 08/29/2020    HGB 10.2 08/29/2020    HCT 30.4 08/29/2020    MCV 91.3 08/29/2020    MCH 30.6 08/29/2020    MCHC 33.6 08/29/2020    RDW 15.0 08/29/2020     08/29/2020    MPV 9.5 08/29/2020     Lab Results   Component Value Date     08/29/2020    K 5.1 08/29/2020    K 5.0 08/27/2020    CL 99 08/29/2020    CO2 10 08/29/2020     08/29/2020    CREATININE 2.3 08/29/2020    CALCIUM 8.9 08/29/2020    PROT 7.7 08/29/2020    LABALBU 3.4 08/29/2020    BILITOT 0.6 08/29/2020    ALKPHOS 136 08/29/2020    AST 34 08/29/2020    ALT 18 08/29/2020     No results found for: LIPASE  No results found for: AMYLASE      ASSESSMENT/PLAN:  1. Anemia  -Acute anemia with concern of upper gastrointestinal blood loss  -Monitor H&H every 6 hours  -Obtain iron studies and FOBT  -PPI gtt. after bolus loading dose  -Elevate head of bed; oxygen nasal cannula  -Consider upper endoscopy depending on patient clinical course/H&H dynamics     2. Sepsis  -Benign abdominal exam with subjective pain and lactic acidosis raising suspicion of ischemic bowel  -Maintain bowel rest  -Antibiotics per admitting service  -Recommend general surgery evaluation    3. C2 fracture  -Place soft collar as per neurosurgery recommendation upon discharge from [facility]  -Further management per admitting    Thank you for the opportunity see this patient in consultation    NOTE:  This report was transcribed using voice recognition software. Every effort was made to ensure accuracy; however, inadvertent computerized transcription errors may be present.     Bala Sykes MD  8/29/2020  11:09 AM

## 2020-08-29 NOTE — CONSULTS
1 Judith Cool MD    Patient's Name/Date of Birth: Mandy Hutton / [birth year]    Date: August 29, 2020     Consulting Surgeon: Gomez Gonzalez MD    PCP: No primary care provider on file. Chief Complaint: lactic acidosis, abdominal pain    HPI:   Mandy Hutton is a 81 y.o. female presenting to the hospital from a nursing home with complaints of dark stools. Patient was recently in the hospital and discharged 2 days ago from [address] [place] [address] where she was evaluated for trauma after a fall, C2 fracture, bilateral rib fractures. Patient presents to the emergency department and [facility] complaining of dark stool as described above. Noted his hemoglobin of 10.2 compared to 12.6 on 27 August.  Patient also is noted to have significantly elevated white blood cell count on 24.8 with hypotension on presentation and associated tachycardia. Patient complains of feeling lousy and having everything wrong with her. She does report some abdominal pain but cannot provide any more details.           Patient Active Problem List   Diagnosis    Fracture of left humerus    Hypercholesterolemia    Systolic hypertension    History of coronary artery bypass graft    COPD (chronic obstructive pulmonary disease) ([address]    Non-insulin dependent type 2 diabetes mellitus ([address]    Fall from standing    Closed nondisplaced fracture of second cervical vertebra ([address]    right 3rd-5th rib and left 9th rib fractures.     Pulmonary nodule, right- Incidental     Liver cyst- Incidental     GI bleed       Allergies   Allergen Reactions    Naproxen Other (See Comments)     GI bleed    Nsaids Nausea And Vomiting    Vicodin [Hydrocodone-Acetaminophen] Nausea And Vomiting       Past Medical History:   Diagnosis Date    CAD (coronary artery disease)     COPD (chronic obstructive pulmonary disease) ([address]     early stage    Diabetes mellitus [facility]     Hyperlipidemia     Hypertension        Past Surgical History:   Procedure Laterality Date    ACHILLES TENDON SURGERY Left     CARDIAC SURGERY      COLONOSCOPY      CORONARY ARTERY BYPASS GRAFT  2013    EYE SURGERY Right     cataract    HYSTERECTOMY      OTHER SURGICAL HISTORY Right 7 7 14    DeQuervains tendonitis thumb       Social History     Socioeconomic History    Marital status:      Spouse name: Not on file    Number of children: Not on file    Years of education: Not on file    Highest education level: Not on file   Occupational History    Not on file   Social Needs    Financial resource strain: Not on file    Food insecurity     Worry: Not on file     Inability: Not on file    Transportation needs     Medical: Not on file     Non-medical: Not on file   Tobacco Use    Smoking status: Former Smoker     Years: 30.00     Last attempt to quit: 4/3/2013     Years since quittin.4    Smokeless tobacco: Never Used   Substance and Sexual Activity    Alcohol use: No    Drug use: Never    Sexual activity: Not Currently   Lifestyle    Physical activity     Days per week: Not on file     Minutes per session: Not on file    Stress: Not on file   Relationships    Social connections     Talks on phone: Not on file     Gets together: Not on file     Attends [employer] service: Not on file     Active member of club or organization: Not on file     Attends meetings of clubs or organizations: Not on file     Relationship status: Not on file    Intimate partner violence     Fear of current or ex partner: Not on file     Emotionally abused: Not on file     Physically abused: Not on file     Forced sexual activity: Not on file   Other Topics Concern    Not on file   Social History Narrative    Not on file       The patient has a family history that is negative for severe cardiovascular or respiratory issues, negative for reaction to anesthesia.      Recent Labs 08/27/20  0502 08/29/20  0328   WBC 9.2 24.8*   HGB 12.6 10.2*   HCT 38.6 30.4*   MCV 89.8 91.3    311       Recent Labs     08/27/20  0502 08/29/20  0328 08/29/20  0441    128* 132   K 5.0 6.4* 5.1*   CO2 20* 9* 10*   BUN 24* 109* 105*   CREATININE 1.3* 2.2* 2.3*       Recent Labs     08/29/20  0328   PROT 7.7         Intake/Output Summary (Last 24 hours) at 8/29/2020 1254  Last data filed at 8/29/2020 0617  Gross per 24 hour   Intake 1100 ml   Output --   Net 1100 ml       I have reviewed relevant labs from this admission and interpretation is included in my assessment and plan      Review of Systems  A complete 10 system review was performed and are otherwise negative unless mentioned in the above HPI. Specific negatives are listed below but may not include all those reviewed.     General ROS: negative obtundation, AMS  ENT ROS: negative rhinorrhea, epistaxis  Allergy and Immunology ROS: negative itchy/watery eyes or nasal congestion  Hematological and Lymphatic ROS: negative spontaneous bleeding or bruising  Endocrine ROS: negative  lethargy, mood swings, palpitations or polydipsia/polyuria  Respiratory ROS: negative sputum changes, stridor, tachypnea or wheezing  Cardiovascular ROS: negative for - loss of consciousness, murmur or orthopnea  Gastrointestinal ROS: negative for - hematochezia or hematemesis  Genito-Urinary ROS: negative for -  genital discharge or hematuria  Musculoskeletal ROS: negative for - focal weakness, gangrene  Psych/Neuro ROS: negative for - visual or auditory hallucinations, suicidal ideation      Physical exam:   BP (!) 97/50   Pulse 95   Temp 97.4 °F (36.3 °C) (Temporal)   Resp 18   Ht 5' 6\" (1.676 m)   Wt 150 lb (68 kg)   SpO2 98%   BMI 24.21 kg/m²   General appearance:  NAD, appears stated age  Head: NCAT, PERRLA, EOMI, red conjunctiva  Neck: supple, no masses, trachea midline  Lungs: Equal chest rise bilateral, no retractions, no wheezing  Heart: Reg rate  Abdomen: soft, nontender, non distended. No clinical signs of peritonitis  Skin; warm and dry, no cyanosis  Gu: no cva tenderness  Extremities: atraumatic, no focal motor deficits, no open wounds  Psych: No tremor, visual hallucinations    Pathology: N/A    Radiology: I reviewed relevant abdominal imaging from this admission and that available in the EMR including CT abd/pel from 8/29/2020. My assessment is no abdominal pathology    Assessment:  Perez Garcia is a 81 y.o. female with abdominal pain and dark stools, lactic acidosis, acute kidney injury    Patient Active Problem List   Diagnosis    Fracture of left humerus    Hypercholesterolemia    Systolic hypertension    History of coronary artery bypass graft    COPD (chronic obstructive pulmonary disease) (HCC)    Non-insulin dependent type 2 diabetes mellitus ([address]    Fall from standing    Closed nondisplaced fracture of second cervical vertebra ([address]    right 3rd-5th rib and left 9th rib fractures.  Pulmonary nodule, right- Incidental     Liver cyst- Incidental     GI bleed       Plan:  In light of the patient's leukocytosis and lactic acidosis, ischemic bowel would be high on the differential diagnosis. Lactic acidosis has come down with IV fluid hydration. We will continue to hydrate and repeat labs in 4 hours. If remains elevated will strongly consider diagnostic laparoscopy. Would prefer to have a CTA of the abdomen to rule out vascular compromise however this was certainly compromise the patient's renal function even more  Discussed with Dr. Luma Santiago, critical care. Will follow closely    Time spent reviewing past medical, surgical, social and family history, vitals, nursing assessment and images. Time spent face to face with patient and family counciling and discussing care exceeded 50% of the time of the consult.  Additional time spent reviewing images and labs, discussing case with nursing, support staff and other physicians;

## 2020-08-29 NOTE — CONSULTS
Patient seen and examined. Consult dictated. Patient is an 81-year-old lady with acute kidney injury and increased anion gap metabolic acidosis with lactic acidosis. Acute kidney injury secondary renal hypoperfusion. Metabolic acidosis secondary to metformin use with lactic acidosis. We will support the patient with bicarbonate. Follow serial electrolytes. If renal function and lactic acid does not improve patient may need intervention with extracorporeal therapy with hemodialysis. Discussed with Dr. Anne-Marie Arana.  , Thank You for allowing me to participate in the care of this patient. Will follow the patient with you.     Piper Ly MD  Nephrology    Electronically signed by Rommel Ferguson MD on 8/29/2020 at 2:05 PM

## 2020-08-29 NOTE — PROGRESS NOTES
Procedure note: Right internal jugular triple-lumen catheter placement with SonoSite guidance    Preoperative diagnosis: GI bleed with poor venous access  Postop diagnosis: GI bleed with poor venous access    Anesthesia 1% local lidocaine. Procedure note: Initial timeout was performed identifying patient procedure. The right internal jugular was then approached after chlorhexidine scrub using sterile gown mask gloves and hair Jose. Seldinger technique was used as well the SonoSite. The skin was anesthetized and over the median approach the right internal jugular vein however was not distended well and would collapse with respirations. Patient was placed in Trendelenburg and a 14-gauge needle advanced into the right internal jugular vein allowing freely flowing venous blood and through the but of the syringe I advanced a J-wire. The needle and syringe were then removed leaving the J-wire in place the skin was struck with a 16 blade scalpel after which a blue dilator was inserted and removed over the existing J-wire. A triple-lumen catheter was placed over the J-wire and secured at 16 cm at the skin margin. The J-wire was removed intact. This was sutured in place with a patented and locking device from arrow. Sterilely dressed. All ports were aspirated free of air and flushed with saline. Sterile dressing was applied over lying this site. Chest x-ray confirmed placement. Camelia Molina DO.   Intensivist

## 2020-08-29 NOTE — H&P
Department of Internal Medicine  History and Physical Examination     Primary Care Physician: No primary care provider on file. Admitting Physician:  Alfredo Garrido DO  Admission date and time: 8/29/2020  3:07 AM    Room:  [facility]24-  Admitting diagnosis: GI bleed [K92.2]    Patient Name: Zandra Mcburney  MRN: [id number]    Date of Service: 8/29/2020     Chief Complaint:  AMS    HISTORY OF PRESENT ILLNESS:    Vinod Sharif is an 81-year-old  female patient who presented from skilled nursing facility for melena. When she arrived acutely ill to the emergency department as she was tachycardic and hypotensive. At that time she was largely encephalopathic but was able to express abdominal discomfort and shortness of breath. Emergency department evaluation was largely abnormal.  Metabolic panel showed hyponatremia, hyperkalemia, hypochloremia, non-anion gap metabolic acidosis and renal failure however the patient's renal baseline is unknown. She was also found to have a lactic acidosis as well and is on metformin as an outpatient. BUN was disproportionately elevated to creatinine at 109/2.2. She was found to have significant leukocytosis as well with a neutrophilic predominance. Anemia was also noted which appears to be at least 2 g below her recent baseline. Hyperglycemia was again noted but below the threshold for diabetic emergency state despite the acidosis. CT of the abdomen and pelvis were performed without contrast due to the patient's renal failure and she was found to have evidence of diverticulosis without overt diverticulitis, complex right hepatic dome cyst and a 3.5 cm fusiform infrarenal abdominal aortic aneurysm. Chest x-ray obtained showed no acute process. Type and screen were obtained and the patient was subsequently admitted to intermediate level of care to the service of /.      She was recently hospitalized at [facility] after a fall that resulted in rib fractures as well as a cervical fracture. Trauma surgery and neurosurgery followed during her hospitalization there and she was recommended for a soft cervical collar while in bed in [place] collar if she was out of bed for any reason due to the cervical fracture. He is seen and examined at bedside today. We have discussed her case directly with the nurse providing care for her as well as reviewed the consultations provided by the GI and critical care consultants after the visit. History is largely obtained by EMR review as the patient is unable to provide any meaningful information. PAST MEDICAL Hx:  Past Medical History:   Diagnosis Date    CAD (coronary artery disease)     COPD (chronic obstructive [facility] disease) [facility]     early stage    Diabetes mellitus ([facility]     Hyperlipidemia     Hypertension        PAST SURGICAL Hx:   Past Surgical History:   Procedure Laterality Date    ACHILLES TENDON SURGERY Left     CARDIAC SURGERY      COLONOSCOPY      CORONARY ARTERY BYPASS GRAFT  april 2013    EYE SURGERY Right     cataract    HYSTERECTOMY      OTHER SURGICAL HISTORY Right 7 7 14    DeQuervains tendonitis thumb       FAMILY Hx:  History reviewed. No pertinent family history. HOME MEDICATIONS:  Prior to Admission medications    Medication Sig Start Date End Date Taking?  Authorizing Provider   busPIRone (BUSPAR) 5 MG tablet Take 5 mg by mouth 2 times daily   Yes Historical Provider, MD   Magnesium Hydroxide (MILK OF MAGNESIA PO) Take 30 mLs by mouth daily as needed   Yes Historical Provider, MD   aspirin 81 MG chewable tablet Take 1 tablet by mouth daily 8/28/20  Yes NILTON Vergara CNP   Heparin Sodium, Porcine, (HEPARIN, PORCINE,) 65406 UNIT/ML injection Inject 0.5 mLs into the skin every 8 hours 8/27/20  Yes NILTON Vergara CNP   insulin lispro (HUMALOG) 100 UNIT/ML injection vial Inject 0-12 Units into the skin 3 times daily (with meals) 8/27/20  Yes NILTON Vergara CNP   insulin lispro (HUMALOG) 100 UNIT/ML injection vial Inject 0-6 Units into the skin nightly 8/27/20  Yes NILTON Castanon CNP   lidocaine 4 % external patch Place 2 patches onto the skin daily 8/28/20  Yes NILTON Castanon CNP   bisacodyl (DULCOLAX) 10 MG suppository Place 1 suppository rectally daily as needed for Constipation 8/27/20 9/26/20 Yes NILTON Castanon CNP   sennosides-docusate sodium (SENOKOT-S) 8.6-50 MG tablet Take 1 tablet by mouth 2 times daily 8/27/20  Yes NILTON Castanon CNP   methocarbamol (ROBAXIN) 750 MG tablet Take 1 tablet by mouth 3 times daily for 10 days 8/27/20 9/6/20 Yes NILTON Castanon CNP   acetaminophen (TYLENOL) 325 MG tablet Take 650 mg by mouth every 6 hours as needed for Pain   Yes Historical Provider, MD   insulin glargine (LANTUS) 100 UNIT/ML injection vial Inject 20 Units into the skin nightly. 1/20/15  Yes Aurora Tolentino MD   citalopram (CELEXA) 20 MG tablet Take 20 mg by mouth daily. Yes Historical Provider, MD   oxybutynin (DITROPAN) 5 MG tablet Take 5 mg by mouth nightly    Yes Historical Provider, MD   calcium carbonate (OYSTER SHELL CALCIUM 500 MG) 1250 MG tablet Take 1 tablet by mouth daily. Yes Historical Provider, MD   metFORMIN (GLUCOPHAGE) 1000 MG tablet Take 1,000 mg by mouth 2 times daily (with meals). Yes Historical Provider, MD   glimepiride (AMARYL) 4 MG tablet Take 8 mg by mouth every morning (before breakfast). Yes Historical Provider, MD   spironolactone (ALDACTONE) 50 MG tablet Take 50 mg by mouth daily. Historical Provider, MD       ALLERGIES:  Naproxen; Nsaids; and Vicodin [hydrocodone-acetaminophen]    SOCIAL Hx:  Social History     Socioeconomic History    Marital status:       Spouse name: Not on file    Number of children: Not on file    Years of education: Not on file    Highest education level: Not on file   Occupational History    Not on file   Social Needs    Financial resource strain: Not on file    Food insecurity Worry: Not on file     Inability: Not on file    Transportation needs     Medical: Not on file     Non-medical: Not on file   Tobacco Use    Smoking status: Former Smoker     Years: 30.00     Last attempt to quit: 4/3/2013     Years since quittin.4    Smokeless tobacco: Never Used   Substance and Sexual Activity    Alcohol use: No    Drug use: Never    Sexual activity: Not Currently   Lifestyle    Physical activity     Days per week: Not on file     Minutes per session: Not on file    Stress: Not on file   Relationships    Social connections     Talks on phone: Not on file     Gets together: Not on file     Attends [employer] service: Not on file     Active member of club or organization: Not on file     Attends meetings of clubs or organizations: Not on file     Relationship status: Not on file    Intimate partner violence     Fear of current or ex partner: Not on file     Emotionally abused: Not on file     Physically abused: Not on file     Forced sexual activity: Not on file   Other Topics Concern    Not on file   Social History Narrative    Not on file       ROS: Due to altered mental status and acuity of illness, unable to be obtained. PHYSICAL EXAM:  VITALS:  Vitals:    20 1112   BP:    Pulse:    Resp: 18   Temp:    SpO2: 98%         CONSTITUTIONAL:    Lethargic and acutely ill-appearing    EYES:    PERRL, EOMI, sclera clear without icterus, conjunctiva pale    ENT:    Normocephalic, atraumatic, sinuses nontender on palpation. External ears without lesions. Oral pharynx with moist mucus membranes. Tonsils without erythema or exudates. NECK:    Supple, symmetrical, trachea midline, no adenopathy, thyroid symmetric, not enlarged and no tenderness, skin normal, no bruits, no JVD. Range of motion was not performed due to the underlying cervical fracture. HEMATOLOGIC/LYMPHATICS:    No cervical lymphadenopathy and no supraclavicular lymphadenopathy    LUNGS:    Symmetric.  No increased work of breathing, Benish air exchange, clear to auscultation bilaterally, no wheezes, rhonchi, or rales,     CARDIOVASCULAR:    Normal apical impulse, regular rate and rhythm, holosystolic murmur is appreciated which does not allow for easy auscultation during diastole. Mildly hypotensive. ABDOMEN:    Obese contour, normal bowel sounds, soft, non-distended, non-tender, no masses palpated, no hepatosplenomegaly, no rebound or guarding elicited on palpation     MUSCULOSKELETAL:    There is no redness, warmth, or swelling of the joints. DTR are intact. Tone is normal.    NEUROLOGIC:    Lethargic and acutely ill, nonfocal.  Does not answer questions or follow commands. SKIN:    Pale. Multiple areas of ecchymosis, no redness, warmth, or swelling    EXTREMITIES:    Peripheral pulses present. No edema, cyanosis, or swelling. LABORATORY DATA:  CBC with Differential:    Lab Results   Component Value Date    WBC 24.8 08/29/2020    RBC 3.33 08/29/2020    HGB 10.2 08/29/2020    HCT 30.4 08/29/2020     08/29/2020    MCV 91.3 08/29/2020    MCH 30.6 08/29/2020    MCHC 33.6 08/29/2020    RDW 15.0 08/29/2020    LYMPHOPCT 4.4 08/29/2020    MONOPCT 0.9 08/29/2020    BASOPCT 0.9 08/29/2020    MONOSABS 0.25 08/29/2020    LYMPHSABS 0.99 08/29/2020    EOSABS 0.00 08/29/2020    BASOSABS 0.22 08/29/2020     Hemoglobin/Hematocrit:    Lab Results   Component Value Date    HGB 10.2 08/29/2020    HCT 30.4 08/29/2020     BMP:    Lab Results   Component Value Date     08/29/2020    K 5.1 08/29/2020    K 5.0 08/27/2020    CL 99 08/29/2020    CO2 10 08/29/2020     08/29/2020    LABALBU 3.4 08/29/2020    CREATININE 2.3 08/29/2020    CALCIUM 8.9 08/29/2020    GFRAA 24 08/29/2020    LABGLOM 20 08/29/2020    GLUCOSE 246 08/29/2020     Troponin:    Lab Results   Component Value Date    TROPONINI <0.01 08/29/2020       ASSESSMENT:  1.  Acute GI bleed with presumed upper GI source secondary to melena and setting of renal failure likely contributed to this as well and this is responded to fluid resuscitation and withholding medication. The patient does not exhibit any overt neurovascular compromise in the setting of recent cervical fracture and we will continue to abide by the patient's bracing instructions as per neurosurgery team at [facility]. Patient continues to exhibit a lack of ability to follow commands however she may require continued Aspen cervical collar for improved immobilization. Abdominal pain is not disclose at this time however the patient's degree of altered mental status likely prohibits this. Sepsis/encephalopathy evaluation is being undertaken as well and antibiotics are being administered. Incidentally noted infrarenal aneurysm is without evidence of dissection or complication and can be followed as an outpatient. Sliding-scale insulin is being utilized and may require adjustments once diet therapy can be reinstituted. Underlying co-morbidites will be addressed during hospitalization as well. Labs and vital signs will be monitored closely and addressed accordingly. Patient will be transitioned to the ICU for closer monitoring. See additional orders for details. BAUTISTA Gaines  1:10 PM  8/29/2020    Electronically signed by NILTON Gaines CNP on 8/29/20 at 1:10 PM EDT    CRITICAL CARE:   39 MINUTES. Please note that the withdrawal or failure to initiate urgent interventions for this patient would likely result in a life threatening deterioration or permanent disability. Accordingly this patient received the above mentioned time, excluding separately billable procedures.

## 2020-08-29 NOTE — CONSULTS
Pulmonary/Critical Care Consult Note    CHIEF COMPLAINT: Body aches all over    HISTORY OF PRESENT ILLNESS: 52-year-old woman was discharged yesterday from [facility] where she was hospitalized after a fall. She returned from the nursing home to the ER with complaints of dark stools. Patient on arrival to the emergency room was hypotensive with a heart rate of 110s BP 85/59 she had some chest discomfort abdominal discomfort and was short of breath. Patient currently denies being short of breath but has some chest discomfort and also neck pain. Reportedly she has recent fractures to her ribs and C2 neck. Neurosurgery has recommended a soft collar in bed rigid collar when up. Patient had significant leukocytosis and lactic acidosis with a hemoglobin of 10.2 in the emergency room. She was cultured and started on Rocephin and Flagyl. She is not having any loose stools. Repeat lactic acid was mildly better. CT of the abdomen pelvis was negative for acute intra-abdominal pathology except for a 3.5 cm fusiform infrarenal abdominal aortic aneurysm and a liver cyst.  Chest x-ray was negative as well. Patient is renal function has significantly deteriorated her BUN was 105 she had a potassium of 6.4 and a creatinine of 2.3. Patient anion gap was 30 on presentation. ALLERGY:  Naproxen; Nsaids; and Vicodin [hydrocodone-acetaminophen]    FAMILY HISTORY:  History reviewed. No pertinent family history. SOCIAL HISTORY:  Social History     Socioeconomic History    Marital status:       Spouse name: Not on file    Number of children: Not on file    Years of education: Not on file    Highest education level: Not on file   Occupational History    Not on file   Social Needs    Financial resource strain: Not on file    Food insecurity     Worry: Not on file     Inability: Not on file    Transportation needs     Medical: Not on file     Non-medical: Not on file   Tobacco Use    Smoking status: Former Smoker     Years: 30.00     Last attempt to quit: 4/3/2013     Years since quittin.4    Smokeless tobacco: Never Used   Substance and Sexual Activity    Alcohol use: No    Drug use: Never    Sexual activity: Not Currently   Lifestyle    Physical activity     Days per week: Not on file     Minutes per session: Not on file    Stress: Not on file   Relationships    Social connections     Talks on phone: Not on file     Gets together: Not on file     Attends Restorationist service: Not on file     Active member of club or organization: Not on file     Attends meetings of clubs or organizations: Not on file     Relationship status: Not on file    Intimate partner violence     Fear of current or ex partner: Not on file     Emotionally abused: Not on file     Physically abused: Not on file     Forced sexual activity: Not on file   Other Topics Concern    Not on file   Social History Narrative    Not on file     Patient was a resident of a nursing home. MEDICAL HISTORY:  Past Medical History:   Diagnosis Date    CAD (coronary artery disease)     COPD [facility]chronic obstructive pulmonary disease) ([facility] [address]     early stage    Diabetes mellitus ([facility] [address]     Hyperlipidemia     Hypertension      Family history noncontributory. MEDICATIONS:   sodium chloride  20 mL Intravenous Once    [START ON 2020] cefTRIAXone (ROCEPHIN) IV  1 g Intravenous Q24H    metroNIDAZOLE  500 mg Intravenous Q8H    insulin lispro  0-12 Units Subcutaneous TID WC    insulin lispro  0-6 Units Subcutaneous Nightly    insulin glargine  20 Units Subcutaneous Nightly      pantoprozole (PROTONIX) infusion 8 mg/hr (20 1212)    sodium chloride 100 mL/hr at 20 1153    dextrose       acetaminophen, glucose, dextrose, glucagon (rDNA), dextrose, magnesium sulfate, sodium phosphate IVPB **OR** sodium phosphate IVPB, potassium chloride, perflutren lipid microspheres    Review of Systems   Constitutional: Positive for fatigue.    HENT: Negative. Eyes: Negative. Respiratory: Negative. Patient has some rib pain apparent fracture   Cardiovascular: Negative. Gastrointestinal: Negative. Endocrine: Negative. Genitourinary: Negative. Musculoskeletal: Positive for arthralgias, myalgias and neck pain. Recent C2 fracture   Skin: Negative. Allergic/Immunologic: Negative. Neurological: Positive for dizziness, weakness and light-headedness. Hematological: Negative. Psychiatric/Behavioral: Positive for confusion. PHYSICAL EXAM:  Vitals:    08/29/20 1112   BP:    Pulse:    Resp: 18   Temp:    SpO2: 98%        O2 Flow Rate (L/min): 2 L/min(comfort)  O2 Device: Nasal cannula    Constitutional: Chronically ill by appearance she is oriented to person she was not sure that she was in the hospital.  Skin: Slightly pale not ischemic not cyanotic  HEENT: Patient has a soft cervical collar on pharynx is clear no sinus tenderness pupils are equal and reactive to light accommodation extraocular muscles intact external ears grossly lesion   Neck: Neck is supple no JVD  Cardiovascular: Rate and rhythm are regular I appreciate no gallop no murmur peripheral pulses intact no signs of DVT  Respiratory: Clear overall no consolidation no focal wheezing  Gastrointestinal: Soft bowel sounds are present no peritoneal signs  and rectal exams deferred. Patient did have black stool in the ER which is heme positive. Genitourinary: Deferred at this time  Extremities: No deformities degenerative changes are seen peripheral pulses intact no signs of DVT no red or swollen joints. No rash. No cervical or supraclavicular nodes. Neurological: Patient is alert and oriented to person she is a poor historian beyond that needs prodding for answers. Cranial nerves gross sensorimotor intact upper lower extremities. Patient is weak generalized nonfocal gait was not observed.   Psychological: Somewhat flat but appropriate slow and anxious from her recurrent presentation    LABS:  WBC   Date Value Ref Range Status   08/29/2020 24.8 (H) 4.5 - 11.5 E9/L Final   08/27/2020 9.2 4.5 - 11.5 E9/L Final   08/26/2020 8.4 4.5 - 11.5 E9/L Final     Hemoglobin   Date Value Ref Range Status   08/29/2020 10.2 (L) 11.5 - 15.5 g/dL Final   08/27/2020 12.6 11.5 - 15.5 g/dL Final   08/26/2020 11.9 11.5 - 15.5 g/dL Final     Hematocrit   Date Value Ref Range Status   08/29/2020 30.4 (L) 34.0 - 48.0 % Final   08/27/2020 38.6 34.0 - 48.0 % Final   08/26/2020 35.5 34.0 - 48.0 % Final     MCV   Date Value Ref Range Status   08/29/2020 91.3 80.0 - 99.9 fL Final   08/27/2020 89.8 80.0 - 99.9 fL Final   08/26/2020 87.4 80.0 - 99.9 fL Final     Platelets   Date Value Ref Range Status   08/29/2020 311 130 - 450 E9/L Final   08/27/2020 228 130 - 450 E9/L Final   08/26/2020 224 130 - 450 E9/L Final     Sodium   Date Value Ref Range Status   08/29/2020 132 132 - 146 mmol/L Final   08/29/2020 128 (L) 132 - 146 mmol/L Final   08/27/2020 132 132 - 146 mmol/L Final     Potassium   Date Value Ref Range Status   08/29/2020 5.1 (H) 3.5 - 5.0 mmol/L Final   08/29/2020 6.4 (H) 3.5 - 5.0 mmol/L Final     Comment:     Specimen is moderately Hemolyzed. Result may be artificially increased. Potassium reflex Magnesium   Date Value Ref Range Status   08/27/2020 5.0 3.5 - 5.0 mmol/L Final     Comment:     Specimen is moderately Hemolyzed. Result may be artificially increased.    08/26/2020 4.3 3.5 - 5.0 mmol/L Final   08/25/2020 4.3 3.5 - 5.0 mmol/L Final     Chloride   Date Value Ref Range Status   08/29/2020 99 98 - 107 mmol/L Final   08/29/2020 89 (L) 98 - 107 mmol/L Final   08/27/2020 97 (L) 98 - 107 mmol/L Final     CO2   Date Value Ref Range Status   08/29/2020 10 (L) 22 - 29 mmol/L Final   08/29/2020 9 (LL) 22 - 29 mmol/L Final   08/27/2020 20 (L) 22 - 29 mmol/L Final     BUN   Date Value Ref Range Status   08/29/2020 105 (H) 8 - 23 mg/dL Final   08/29/2020 109 (H) 8 - 23 mg/dL Final 08/27/2020 24 (H) 8 - 23 mg/dL Final     CREATININE   Date Value Ref Range Status   08/29/2020 2.3 (H) 0.5 - 1.0 mg/dL Final   08/29/2020 2.2 (H) 0.5 - 1.0 mg/dL Final   08/27/2020 1.3 (H) 0.5 - 1.0 mg/dL Final     Glucose   Date Value Ref Range Status   08/29/2020 246 (H) 74 - 99 mg/dL Final   08/29/2020 275 (H) 74 - 99 mg/dL Final   08/27/2020 78 74 - 99 mg/dL Final     Calcium   Date Value Ref Range Status   08/29/2020 8.9 8.6 - 10.2 mg/dL Final   08/29/2020 9.9 8.6 - 10.2 mg/dL Final   08/27/2020 10.3 (H) 8.6 - 10.2 mg/dL Final     Total Protein   Date Value Ref Range Status   08/29/2020 7.7 6.4 - 8.3 g/dL Final   05/09/2019 7.7 6.4 - 8.3 g/dL Final     Alb   Date Value Ref Range Status   08/29/2020 3.4 (L) 3.5 - 5.2 g/dL Final   05/09/2019 4.2 3.5 - 5.2 g/dL Final     Total Bilirubin   Date Value Ref Range Status   08/29/2020 0.6 0.0 - 1.2 mg/dL Final   05/09/2019 0.5 0.0 - 1.2 mg/dL Final     Alkaline Phosphatase   Date Value Ref Range Status   08/29/2020 136 (H) 35 - 104 U/L Final     Comment:     Specimen is moderately Hemolyzed. Result may be artificially decreased. 05/09/2019 136 (H) 35 - 104 U/L Final     AST   Date Value Ref Range Status   08/29/2020 34 (H) 0 - 31 U/L Final     Comment:     Specimen is moderately Hemolyzed. Result may be artificially increased. 05/09/2019 20 0 - 31 U/L Final     ALT   Date Value Ref Range Status   08/29/2020 18 0 - 32 U/L Final     Comment:     Specimen is moderately Hemolyzed. Result may be artificially increased. 05/09/2019 20 0 - 32 U/L Final     GFR Non-   Date Value Ref Range Status   08/29/2020 20 >=60 mL/min/1.73 Final     Comment:     Chronic Kidney Disease: less than 60 ml/min/1.73 sq.m. Kidney Failure: less than 15 ml/min/1.73 sq.m. Results valid for patients 18 years and older. 08/29/2020 21 >=60 mL/min/1.73 Final     Comment:     Chronic Kidney Disease: less than 60 ml/min/1.73 sq.m.           Kidney Failure: less than 15 ml/min/1.73 sq.m. Results valid for patients 18 years and older. 08/27/2020 39 >=60 mL/min/1.73 Final     Comment:     Chronic Kidney Disease: less than 60 ml/min/1.73 sq.m. Kidney Failure: less than 15 ml/min/1.73 sq.m. Results valid for patients 18 years and older. GFR    Date Value Ref Range Status   08/29/2020 24  Final   08/29/2020 26  Final   08/27/2020 47  Final     No results found for: MG  No results found for: PHOS  No results for input(s): PH, PO2, PCO2, HCO3, BE, O2SAT in the last 72 hours. RADIOLOGY:  XR CHEST PORTABLE   Preliminary Result   An acute process is not appreciated. Rotated exam.         CT ABDOMEN PELVIS WO CONTRAST Additional Contrast? None   Preliminary Result   No acute abdominopelvic findings. No substantial change in size or appearance of complex cyst at the right   hepatic dome. This could be proteinaceous or hemorrhagic although   multiphasic liver protocol MRI could further characterize. Colonic diverticulosis without evidence of diverticulitis. 3.5 cm fusiform infrarenal abdominal aortic aneurysm. See recommendations   for follow-up. RECOMMENDATIONS:   For management of fusiform AAA:      3.5-3.9 cm AAA, recommend follow-up every 2 years. References: Thurlow Skates Radiol 2013; 57(56):707-300; J Vasc Surg. 2018; 67:2-77             IMPRESSION:  1. Acute GI bleed although patient's hemoglobin is currently preserved secondary to intravascular volume depletion  2. Hypovolemic shock versus sepsis  3. Acute kidney injury with hyperkalemia  4. Metabolic acidosis associated with shock  5. Recent cervical fracture and rib fracture  6. Transient diffuse abdominal pain may have been related to some transient bowel ischemia secondary to poor perfusion with the GI bleed and hypovolemia. 7. Infrarenal aneurysm as discussed above  8. Type 2 DM insulin dependent    PLAN:  1.  Agree with transfer to the ICU for continuous monitoring  2. Volume olume resuscitation  3. Serial H&H's transfused 2 maintain hemoglobin above 8  4. Reevaluation of renal function ongoing  5. Empiric antibiotics as you have undertaken  6. PPI drip and glycemic control with coverage scale  7. Avoid any nephrotoxins, ACE inhibitors and diuretics  8. Fall precautions    ATTESTATION:  ICU Staff Physician note of personal involvement in Care  As the attending physician, I certify that I personally reviewed the patients history and personnally examined the patient to confirm the physical findings described above,  And that I reviewed the relevant imaging studies and available reports. I also discussed the differential diagnosis and all of the proposed management plans with the patient and individuals accompanying the patient to this visit. They had the opportunity to ask questions about the proposed management plans and to have those questions answered. This patient has a high probability of sudden, clinically significant deterioration, which requires the highest level of physician preparedness to intervene urgently. I managed/supervised life or organ supporting interventions that required frequent physician assessment. I devoted my full attention to the direct care of this patient for the amount of time indicated below. Time I spent with the family or surrogate(s) is included only if the patient was incapable of providing the necessary information or participating in medical decisions - Time devoted to teaching and to any procedures I billed separately is not included.     CRITICAL CARE TIME:  45 minutes    Electronically signed by Amy Watson DO on 8/29/2020 at 12:41 PM

## 2020-08-29 NOTE — PROGRESS NOTES
Called by nursing to discuss lab results. Hgb decrease from ~10 (at 03:30) to 7.0 (at 14:10). Does not appear patient received blood products (ED not does not correlate with the EMR) and did receive a couple of boluses of fluids and maintenance fluids. Pt with one bout of melena on the floor, no further bouts. BP currently 116/55, HR 90s, which has been improved from ER presentation, and currently stable. Questionable component of dilution and equilibration, difficult to tell if continued blood loss at this point. Pt last dose of Eliquis yesterday, ideally would like hold on endoscopy till off 48hrs, unless becomes emergent. Septic component likely contributing to hypotension. Note pt is also high risk given her remote cervical fracture treated conservatively. We will repeat state H&H, with plans for potential EGD tomorrow. Continue to monitor clinical status. Will discuss case with pt's daughter.

## 2020-08-29 NOTE — ED PROVIDER NOTES
Chief Complaint: Dark stools        HPI:  8/29/20, Time: 3:38 AM EDT      HPI       Jin Farfan is a 81 y.o. female presenting to the ED for the chief complaint of dark stools. The patient was brought from her nursing home with dark stools. On arrival the patient was hypotensive with a BP of 85/59, and tachycardic with a HR of 110. The patient states she feels terrible. She states she is having chest discomfort and abdominal discomfort and shortness of breath. She denies fevers, chills, headache, nausea, vomiting. hemoccult was positive. ROS:     Review of Systems   Constitutional: Positive for fatigue. Negative for chills, diaphoresis and fever. HENT: Negative for congestion and rhinorrhea. Eyes: Negative for visual disturbance. Respiratory: Negative for cough and shortness of breath. Cardiovascular: Positive for chest pain. Gastrointestinal: Positive for blood in stool and diarrhea. Negative for abdominal distention, abdominal pain, nausea and vomiting. Genitourinary: Negative for difficulty urinating, dysuria and hematuria. Musculoskeletal: Negative for back pain. Skin: Positive for pallor. Neurological: Negative for dizziness, syncope and light-headedness. Hematological: Bruises/bleeds easily (Receiving heparin injections).       --------------------------------------------- PAST HISTORY ---------------------------------------------  Past Medical History:  has a past medical history of CAD (coronary artery disease), COPD (chronic obstructive pulmonary disease) ([facility] [address] Diabetes mellitus ([facility] [address] Hyperlipidemia, and Hypertension. Past Surgical History:  has a past surgical history that includes Cardiac surgery; Hysterectomy; Coronary artery bypass graft (april 2013); Achilles tendon surgery (Left); Colonoscopy; eye surgery (Right); and other surgical history (Right, 7 7 14). Social History:  reports that she quit smoking about 7 years ago. She quit after 30.00 years of use.  She has never used smokeless tobacco. She reports that she does not drink alcohol or use drugs. Family History: family history is not on file. The patients home medications have been reviewed. Allergies: Naproxen; Nsaids; and Vicodin [hydrocodone-acetaminophen]    ---------------------------------------------------PHYSICAL EXAM--------------------------------------  Constitutional/General: Alert and oriented x3, patient is pale,   Head: Normocephalic and atraumatic  Eyes: PERRL, EOMI. Conjunctiva are pale  Mouth: Oropharynx clear, handling secretions, no trismus  Neck: Supple, full ROM, non tender to palpation in the midline, no stridor, no crepitus, no meningeal signs  Pulmonary: Lungs clear to auscultation bilaterally, no wheezes, rales, or rhonchi. Not in respiratory distress  Cardiovascular:  Regular rate. Regular rhythm. No murmurs, gallops, or rubs. 2+ distal pulses  Chest: no chest wall tenderness  Abdomen: Soft. Non tender. Non distended. +BS. No rebound, guarding, or rigidity. No pulsatile masses appreciated. hemoccult positive  Musculoskeletal: Moves all extremities x 4. Warm and well perfused, no clubbing, cyanosis, or edema. Capillary refill <3 seconds  Skin: warm and dry. No rashes. Neurologic: GCS 15, CN 2-12 grossly intact, no focal deficits, symmetric strength 5/5 in the upper and lower extremities bilaterally  Psych: Normal Affect    -------------------------------------------------- RESULTS -------------------------------------------------  I have personally reviewed all laboratory and imaging results for this patient. Results are listed below.      LABS:  Results for orders placed or performed during the hospital encounter of 08/29/20   CBC auto differential   Result Value Ref Range    WBC 24.8 (H) 4.5 - 11.5 E9/L    RBC 3.33 (L) 3.50 - 5.50 E12/L    Hemoglobin 10.2 (L) 11.5 - 15.5 g/dL    Hematocrit 30.4 (L) 34.0 - 48.0 %    MCV 91.3 80.0 - 99.9 fL    MCH 30.6 26.0 - 35.0 pg    MCHC 33.6 32.0 - 34.5 %    RDW 15.0 11.5 - 15.0 fL    Platelets 417 199 - 165 E9/L    MPV 9.5 7.0 - 12.0 fL    Neutrophils % 93.9 (H) 43.0 - 80.0 %    Lymphocytes % 4.4 (L) 20.0 - 42.0 %    Monocytes % 0.9 (L) 2.0 - 12.0 %    Eosinophils % 0.0 0.0 - 6.0 %    Basophils % 0.9 0.0 - 2.0 %    Neutrophils Absolute 23.31 (H) 1.80 - 7.30 E9/L    Lymphocytes Absolute 0.99 (L) 1.50 - 4.00 E9/L    Monocytes Absolute 0.25 0.10 - 0.95 E9/L    Eosinophils Absolute 0.00 (L) 0.05 - 0.50 E9/L    Basophils Absolute 0.22 (H) 0.00 - 0.20 E9/L    Anisocytosis 1+     Polychromasia 1+     Hypochromia 1+     Poikilocytes 3+     [place] Cells 3+     Ovalocytes 1+    Comprehensive metabolic panel   Result Value Ref Range    Sodium 128 (L) 132 - 146 mmol/L    Potassium 6.4 (H) 3.5 - 5.0 mmol/L    Chloride 89 (L) 98 - 107 mmol/L    CO2 9 (LL) 22 - 29 mmol/L    Anion Gap 30 (H) 7 - 16 mmol/L    Glucose 275 (H) 74 - 99 mg/dL     (H) 8 - 23 mg/dL    CREATININE 2.2 (H) 0.5 - 1.0 mg/dL    GFR Non-African American 21 >=60 mL/min/1.73    GFR African American 26     Calcium 9.9 8.6 - 10.2 mg/dL    Total Protein 7.7 6.4 - 8.3 g/dL    Alb 3.4 (L) 3.5 - 5.2 g/dL    Total Bilirubin 0.6 0.0 - 1.2 mg/dL    Alkaline Phosphatase 136 (H) 35 - 104 U/L    ALT 18 0 - 32 U/L    AST 34 (H) 0 - 31 U/L   Lactic Acid, Plasma   Result Value Ref Range    Lactic Acid 7.3 (HH) 0.5 - 2.2 mmol/L   Troponin   Result Value Ref Range    Troponin <0.01 0.00 - 0.03 ng/mL   Basic Metabolic Panel   Result Value Ref Range    Sodium 132 132 - 146 mmol/L    Potassium 5.1 (H) 3.5 - 5.0 mmol/L    Chloride 99 98 - 107 mmol/L    CO2 10 (L) 22 - 29 mmol/L    Anion Gap 23 (H) 7 - 16 mmol/L    Glucose 246 (H) 74 - 99 mg/dL     (H) 8 - 23 mg/dL    CREATININE 2.3 (H) 0.5 - 1.0 mg/dL    GFR Non-African American 20 >=60 mL/min/1.73    GFR African American 24     Calcium 8.9 8.6 - 10.2 mg/dL   TYPE AND SCREEN   Result Value Ref Range    ABO/Rh O NEG     Antibody Screen NEG    PREPARE RBC (CROSSMATCH), 2 Units   Result Value Ref Range    Product Code Blood Bank [id number]     Description Blood Bank Red Blood Cells, Apheresis, Leuko-reduced     Unit Number [id number]     Dispense Status Blood Bank selected     Product Code Blood Bank [id number]     Description Blood Bank Red Blood Cells, Leuko-reduced     Unit Number [id number]     Dispense Status Blood Bank selected        RADIOLOGY:  Interpreted by Radiologist.  CT ABDOMEN PELVIS WO CONTRAST Additional Contrast? None   Preliminary Result   No acute abdominopelvic findings. No substantial change incisor appearance of complex cyst at the right hepatic   dome. This could be proteinaceous or hemorrhagic although multiphasic liver   protocol MRI could further characterize. Colonic diverticulosis without evidence of diverticulitis. 3.5 cm fusiform infrarenal abdominal aortic aneurysm. See recommendations   follow-up. RECOMMENDATIONS:   For management of fusiform AAA:      3.5-3.9 cm AAA, recommend follow-up every 2 years. References: Tova Mayra Radiol 2013; 87(03):630-509; J Vasc Surg. 2018; 67:2-77         XR CHEST PORTABLE    (Results Pending)         EKG Interpretation  Interpreted by emergency department physician    Rhythm: normal sinus With PACs   Rate: 98  Axis: normal  Conduction: normal  ST Segments: nonspecific changes  T Waves: non specific changes    Clinical Impression: sinus rhythm with PACs  Comparison to prior EKG: None      ------------------------- NURSING NOTES AND VITALS REVIEWED ---------------------------   The nursing notes within the ED encounter and vital signs as below have been reviewed by myself. /63   Pulse 100   Temp 97.6 °F (36.4 °C) (Axillary)   Resp 21   Ht 5' 6\" (1.676 m)   Wt 150 lb (68 kg)   SpO2 96%   BMI 24.21 kg/m²   Oxygen Saturation Interpretation: Normal    The patients available past medical records and past encounters were reviewed.         ------------------------------ ED COURSE/MEDICAL DECISION MAKING----------------------  Medications   0.9 % sodium chloride bolus (has no administration in time range)   0.9 % sodium chloride bolus (0 mLs Intravenous Stopped 20 0509)   pantoprazole (PROTONIX) injection 80 mg (80 mg Intravenous Given 20 0350)   0.9 % sodium chloride bolus (1,000 mLs Intravenous New Bag 20 0515)   cefTRIAXone (ROCEPHIN) 1 g in sterile water 10 mL IV syringe (0 g Intravenous Stopped 20 0510)   metronidazole (FLAGYL) 500 mg in NaCl 100 mL IVPB premix (500 mg Intravenous New Bag 20 0509)       ED Course as of Aug 29 0617   Sat Aug 29, 2020   0445 Patient is resting in bed no distress. Blood pressure is improved with IV fluids. Blood transfusion is pending.    [MS]      ED Course User Index  [MS] Mehnaz Macdonald,           Medical Decision Makinyear old female presents with the chief complaint of black stools. Patient was having shortness of breath and looked pale. hemoccult was positive. Patient was given 2 units of blood. Patient was given a 1 L fluid bolus and started on protonix. CBC, CMP Lactic acid, troponin, type and screen were obtained and were significant for a WBC count of 24.8. Hbg of 10.2, Lactic acid of 7.3. patient was started on rocephin and flagyl. The patient was given a second liter of fluid and repeat lactic acid was done. CT abdomen was obtained and demonstrates no acute intraabdominal pathology  CXR was obtained and showed no acute pathology. This patient's ED course included: re-evaluation prior to disposition and a personal history and physicial eaxmination    This patient has remained hemodynamically stable during their ED course. Counseling: The emergency provider has spoken with the patient and discussed todays results, in addition to providing specific details for the plan of care and counseling regarding the diagnosis and prognosis.   Questions are answered at this time and they are agreeable with the plan.       --------------------------------- IMPRESSION AND DISPOSITION ---------------------------------    IMPRESSION  1. Gastrointestinal hemorrhage, unspecified gastrointestinal hemorrhage type    2. Lactic acidosis    3. Acute blood loss anemia    4. Hypotension due to hypovolemia    5. VINCENT (acute kidney injury) [facility])        ED Course as of Aug 29 0617   Sat Aug 29, 2020   1104 Patient is resting in bed no distress. Blood pressure is improved with IV fluids. Blood transfusion is pending.    [MS]      ED Course User Index  [MS] Travon Cool,        DISPOSITION  Disposition: Admit to Intermediate  Patient condition is stable      ATTENDING PROVIDER ATTESTATION:     Jenny Kovacs presented to the emergency department for evaluation of GI Bleeding (GI bleed)    I have reviewed and discussed the case, including pertinent history (medical, surgical, family and social) and exam findings with the Resident and the Nurse assigned to Jenny Kovacs. I have personally performed and/or participated in the history, exam, medical decision making, and procedures and agree with all pertinent clinical information. I have reviewed my findings and recommendations with Jenny Kovacs and members of family present at the time of disposition. Please note that the withdrawal or failure to initiate urgent interventions for this patient would likely result in a life threatening deterioration or permanent disability. Accordingly this patient received 30 minutes of critical care time, excluding separately billable procedures. MDM: Supportive care, will obtain appropriate labs and imaging to assess patient's GI Bleeding (GI bleed)       My findings/plan: The primary encounter diagnosis was Gastrointestinal hemorrhage, unspecified gastrointestinal hemorrhage type.  Diagnoses of Lactic acidosis, Acute blood loss anemia, Hypotension due to hypovolemia, and VINCENT (acute kidney injury) [facility]) were also pertinent to this visit.   New Prescriptions    No medications on file     Martha Morrison, DO Martha Morrison, DO  08/29/20 37 Cammy Yanez, DO  08/29/20 [id number]

## 2020-08-29 NOTE — CONSULTS
[address] [place] [address]                                  CONSULTATION    PATIENT NAME: Tessa Lewis                 :        1935  MED REC NO:   [id number]                            ROOM:       04  ACCOUNT NO:   [de-identified]                           ADMIT DATE: 2020  PROVIDER:     Orlin Calvo MD    CONSULT DATE:  2020    ADMITTING PHYSICIAN:  Eve Oppenheim, DO    REASON FOR CONSULTATION:  Acute kidney injury and metabolic acidosis. HISTORY OF PRESENT ILLNESS:  The patient is being seen in consultation  at the request of Dr. Robbie Beck. The patient is an 71-year-old lady with  underlying history of type 2 non-insulin-dependent diabetes mellitus,  atherosclerotic heart disease, hypertension, and hyperlipidemia. The  patient is admitted from a skilled nursing facility for melena. The  patient has been confused and disoriented. The patient's initial labs  revealed a serum sodium of 128 mmol/L with a potassium of 6.4 mmol/L,  BUN of 109 mg/dL, creatinine of 2.2 mg/dL with the lactic acid of 7.3. The patient has been hydrated. Serum creatinine is still elevated at  2.3 mg/dL, but the lactic acid is lower at 6.4 mmol/L. When seen up on  the floor, the patient is somewhat poor historian. Oral intake has been  poor. The patient had a CT scan of the abdomen and pelvis without  intravenous contrast and the patient was found to have diverticulosis  without overt diverticulitis with a complex right hepatic dome cyst and  a 3.5 cm fusiform intrarenal abdominal aortic aneurysm. Chest x-ray was  unremarkable. It is of note that the patient was recently hospitalized  at [address] [place] [address] after a fall,  which resulted in rib fractures as well as a cervical fracture. The  patient's baseline serum creatinine is 1.3 mg/dL.     PAST MEDICAL HISTORY:  Significant for history of atherosclerotic heart  disease, status post coronary artery bypass surgery; history of COPD;  history of type 2 non-insulin-dependent diabetes mellitus; history of  hyperlipidemia; history of hypertension; history of underlying chronic  kidney disease, stage G3 with a baseline serum creatinine of 1.3 mg/dL. PAST SURGICAL HISTORY:  Significant for coronary artery bypass surgery. She has had right cataract extraction surgery, history of hysterectomy,  history of Achilles tendon repair on the left. FAMILY HISTORY:  The patient is unable to provide family history. SOCIAL HISTORY:  The patient is . She is a nursing home  resident. She is a former smoker. No history of alcohol use. Due to  altered mental status, complete family history and social history not  available. ALLERGIES:  The patient has allergies to NAPROXEN, NONSTEROIDAL  ANTIINFLAMMATORY DRUGS, and VICODIN. MEDICATIONS PRIOR TO ADMISSION:  Included BuSpar 5 mg twice a day, milk  of magnesia p.r.n., aspirin 81 mg daily, heparin 5000 units  subcutaneously every 8 hours, Humalog insulin on a sliding scale,  Lidocaine patch once a day, Robaxin 750 mg three times a day, Tylenol  p.r.n., Lantus insulin 20 units at bedtime, citalopram 20 mg daily,  oxybutynin that is Ditropan 5 mg at bedtime, calcium carbonate one  tablet daily, metformin 1000 mg twice a day, glimepiride 4 mg daily, and  Aldactone 50 mg daily. CURRENT MEDICATIONS AT [facility]:  The patient is on 0.9 normal  saline at the rate of 100 mL an hour, Rocephin 1 gm IV q.24 hours. Lantus insulin 20 units at bedtime, Humalog insulin on a sliding scale,  Protonix infusion. The patient is on Glucagon p.r.n. REVIEW OF SYSTEMS:  Significant for recent hospitalization at [address] [place] [address] in [facility]. Rest of the review of  system is not available due to the patient's altered mental status.     PHYSICAL EXAMINATION:  GENERAL:  The patient is awake, but confused. Able to follow simple  commands. VITAL SIGNS:  Blood pressure is 107/74, pulse is 73, respiratory rate  22, and temperature 97.7 degrees Fahrenheit. HEENT:  Head is normocephalic. Pupils are equal and reactive to light  and accommodation. Fundus examination is deferred. Mouth and throat:   Dry oral mucosa without any mucosal ulceration or exudate. NECK:  Supple. No distention. No carotid bruits. No palpable masses. CHEST:  Symmetrical.  There is mild kyphosis of thoracic spine. LUNGS:  Vesicular breath sounds which is equal bilaterally. No rales or  rhonchi. HEART:  Regular rate and rhythm with a grade 2/6 systolic murmur along  the left sternal border. ABDOMEN:  Soft, nontender. Normal bowel sounds. No rebound tenderness. EXTREMITIES:  No pedal edema. LABORATORY DATA:  From this morning, sodium 132 mmol/L, potassium 5.1  mmol/L, chloride 99 mmol/L, CO2 10 mmol/L.  mg/dL, creatinine  2.3 mg/dL. Lactic acid 6.4 mg/dL and calcium of 8.9 mg/dL. Labs upon  presentation showed a CO2 of 9 mmol/L with a BUN of 109 mg/dL,  creatinine 2.2 mg/dL, potassium 6.4 mmol/L, sodium 128 mmol/L. IMPRESSION:  1. Acute kidney injury. Acute kidney injury in this patient in all  probability secondary to volume depletion in the setting of hypotension  and concurrent use of diuretics. We will hydrate the patient and follow  renal function closely. Check urine for sodium, creatinine, and  chloride. 2.  Metabolic acidosis. The patient has severe metabolic acidosis with  increase in anion gap with a delta anion gap to delta bicarbonate ratio  of close to 1. This is secondary to lactic acidosis. We will monitor  lactic acidosis. If no improvement, the patient may need intervention  with renal replacement therapy. We will support with sodium  bicarbonate. We will follow serial electrolytes level if lactic acid is  not consistently falling with intrarenal replacement therapy.   3.

## 2020-08-29 NOTE — ED NOTES
Critical Lab Results:  Lactic Acid 7.3 and CO2 9. MATTY hendrix notified and Dr Raymond Never also aware.       Joselito Manrique RN  08/29/20 [id number]

## 2020-08-29 NOTE — ANESTHESIA PRE PROCEDURE
(CELEXA) 20 MG tablet Take 20 mg by mouth daily. Yes Historical Provider, MD   oxybutynin (DITROPAN) 5 MG tablet Take 5 mg by mouth nightly    Yes Historical Provider, MD   calcium carbonate (OYSTER SHELL CALCIUM 500 MG) 1250 MG tablet Take 1 tablet by mouth daily. Yes Historical Provider, MD   metFORMIN (GLUCOPHAGE) 1000 MG tablet Take 1,000 mg by mouth 2 times daily (with meals). Yes Historical Provider, MD   glimepiride (AMARYL) 4 MG tablet Take 8 mg by mouth every morning (before breakfast). Yes Historical Provider, MD   spironolactone (ALDACTONE) 50 MG tablet Take 50 mg by mouth daily.     Historical Provider, MD       Current medications:    Current Facility-Administered Medications   Medication Dose Route Frequency Provider Last Rate Last Dose    0.9 % sodium chloride bolus  20 mL Intravenous Once Samira DO Rubina        acetaminophen (TYLENOL) tablet 650 mg  650 mg Oral Q6H PRN Gonzalez Severino DO   650 mg at 08/29/20 1055    [START ON 8/30/2020] cefTRIAXone (ROCEPHIN) 1 g in sterile water 10 mL IV syringe  1 g Intravenous Q24H Rosario Isabel MD        metronidazole (FLAGYL) 500 mg in NaCl 100 mL IVPB premix  500 mg Intravenous Q8H Rosario Isabel MD        pantoprazole (PROTONIX) 80 mg in sodium chloride 0.9 % 100 mL infusion  8 mg/hr Intravenous Continuous Rosario Isabel MD 10 mL/hr at 08/29/20 1212 8 mg/hr at 08/29/20 1212    glucose (GLUTOSE) 40 % oral gel 15 g  15 g Oral PRN NILTON Wei CNP        dextrose 50 % IV solution  12.5 g Intravenous PRN NILTON Wei CNP        glucagon (rDNA) injection 1 mg  1 mg Intramuscular PRN NILTON Wei CNP        dextrose 5 % solution  100 mL/hr Intravenous PRN NILTON Wei CNP        insulin lispro (HUMALOG) injection vial 0-12 Units  0-12 Units Subcutaneous TID WC NILTON Wei CNP   6 Units at 08/29/20 1202    insulin lispro (HUMALOG) injection vial 0-6 Units  0-6 Units Subcutaneous Nightly Tiny Lie, APRN - CNP        magnesium sulfate 1 g in dextrose 5% 100 mL IVPB  1 g Intravenous PRN Tiny Lie, APRN - CNP        sodium phosphate 10.89 mmol in dextrose 5 % 250 mL IVPB  0.16 mmol/kg Intravenous PRN Tiny Lie, APRN - CNP        Or    sodium phosphate 21.75 mmol in dextrose 5 % 500 mL IVPB  0.32 mmol/kg Intravenous PRN Tiny Lie, APRN - CNP        potassium chloride 10 mEq/100 mL IVPB (Peripheral Line)  10 mEq Intravenous PRN Tiny Lie, APRN - CNP        insulin glargine (LANTUS) injection vial 20 Units  20 Units Subcutaneous Nightly Tiny Lie, APRN - CNP        perflutren lipid microspheres (DEFINITY) injection 1.65 mg  1.5 mL Intravenous ONCE PRN Tiny Lie, APRN - CNP        sodium bicarbonate 150 mEq in dextrose 5 % 1,000 mL IVPB   Intravenous Continuous Nicole Garibay MD           Allergies: Allergies   Allergen Reactions    Naproxen Other (See Comments)     GI bleed    Nsaids Nausea And Vomiting    Vicodin [Hydrocodone-Acetaminophen] Nausea And Vomiting       Problem List:    Patient Active Problem List   Diagnosis Code    Fracture of left humerus S38.5A    Hypercholesterolemia F63.78    Systolic hypertension U04    History of coronary artery bypass graft Z95.1    COPD (chronic obstructive pulmonary disease) (HCC) J44.9    Non-insulin dependent type 2 diabetes mellitus ([address] E11.9    Fall from standing W19. XXXA    Closed nondisplaced fracture of second cervical vertebra ([address] S12.101A    right 3rd-5th rib and left 9th rib fractures.  S22.43XA    Pulmonary nodule, right- Incidental  R91.1    Liver cyst- Incidental  K76.89    GI bleed K92.2       Past Medical History:        Diagnosis Date    CAD (coronary artery disease)     COPD (chronic obstructive pulmonary disease) ([address]     early stage    Diabetes mellitus ([address]     Hyperlipidemia     Hypertension        Past Surgical History:        Procedure Laterality Date    ACHILLES TENDON SURGERY Left     CARDIAC SURGERY      COLONOSCOPY      CORONARY ARTERY BYPASS GRAFT  2013    EYE SURGERY Right     cataract    HYSTERECTOMY      OTHER SURGICAL HISTORY Right 7 7 14    DeQuervains tendonitis thumb       Social History:    Social History     Tobacco Use    Smoking status: Former Smoker     Years: 30.00     Last attempt to quit: 4/3/2013     Years since quittin.4    Smokeless tobacco: Never Used   Substance Use Topics    Alcohol use: No                                Counseling given: Not Answered      Vital Signs (Current):   Vitals:    20 0845 20 1100 20 1112 20 1350   BP: (!) 95/53 (!) 97/50  107/74   Pulse: 102 95  93   Resp: 20  18 22   Temp: 97.5 °F (36.4 °C) 97.4 °F (36.3 °C)  97.7 °F (36.5 °C)   TempSrc: Temporal Temporal  Axillary   SpO2: 95% 97% 98% 100%   Weight:    149 lb 14.4 oz (68 kg)   Height:                                                  BP Readings from Last 3 Encounters:   20 107/74   20 116/66   20 (!) 156/82       NPO Status:                                                                                 BMI:   Wt Readings from Last 3 Encounters:   20 149 lb 14.4 oz (68 kg)   20 150 lb (68 kg)   20 150 lb (68 kg)     Body mass index is 24.19 kg/m².     CBC:   Lab Results   Component Value Date    WBC 27.7 2020    RBC 2.27 2020    HGB 7.0 2020    HCT 20.8 2020    MCV 91.6 2020    RDW 15.3 2020     2020       CMP:   Lab Results   Component Value Date     2020    K 5.1 2020    K 5.0 2020    CL 99 2020    CO2 10 2020     2020    CREATININE 2.3 2020    GFRAA 24 2020    LABGLOM 20 2020    GLUCOSE 246 2020    PROT 7.7 2020    CALCIUM 8.9 2020    BILITOT 0.6 2020    ALKPHOS 136 2020    AST 34 2020    ALT 18 2020       POC Tests: No results for input(s): POCGLU, POCNA, POCK, POCCL, POCBUN, POCHEMO, POCHCT in the last 72 hours. Coags:   Lab Results   Component Value Date    PROTIME 11.4 01/17/2015    INR 1.0 01/17/2015       HCG (If Applicable): No results found for: PREGTESTUR, PREGSERUM, HCG, HCGQUANT     ABGs: No results found for: PHART, PO2ART, YDE5ASY, NMM0TWB, BEART, L4YGIMSM     Type & Screen (If Applicable):  No results found for: LABABO, LABRH    Drug/Infectious Status (If Applicable):  No results found for: HIV, HEPCAB    COVID-19 Screening (If Applicable): No results found for: COVID19      Anesthesia Evaluation  Patient summary reviewed  Airway:         Dental:          Pulmonary:   (+) COPD:                            ROS comment: Former Smoker. Cardiovascular:    (+) hypertension:, CAD:, CABG/stent:, hyperlipidemia      ECG reviewed                     ROS comment: EKG: Sinus Rhythm 98, with Paroxysmal Atrial Contractions. Neuro/Psych:   Negative Neuro/Psych ROS              GI/Hepatic/Renal:   (+) liver disease:,           Endo/Other:    (+) DiabetesType II DM, , .                 Abdominal:           Vascular: negative vascular ROS. Anesthesia Plan      MAC     ASA 3       Induction: intravenous. Anesthetic plan and risks discussed with patient. Plan discussed with CRNA.     Attending anesthesiologist reviewed and agrees with Nicole Le MD   8/29/2020

## 2020-08-29 NOTE — ED NOTES
Critical Lactic Acid result of 6.4 called from lab. Dr. Desmond Linn made aware.      Gisele Osorio RN  08/29/20 0811

## 2020-08-30 ENCOUNTER — ANESTHESIA (OUTPATIENT)
Dept: ENDOSCOPY | Age: 85
DRG: 871 | End: 2020-08-30
Payer: COMMERCIAL

## 2020-08-30 VITALS
DIASTOLIC BLOOD PRESSURE: 62 MMHG | SYSTOLIC BLOOD PRESSURE: 128 MMHG | OXYGEN SATURATION: 100 % | RESPIRATION RATE: 15 BRPM

## 2020-08-30 LAB
ANION GAP SERPL CALCULATED.3IONS-SCNC: 12 MMOL/L (ref 7–16)
ANION GAP SERPL CALCULATED.3IONS-SCNC: 15 MMOL/L (ref 7–16)
APTT: 23.5 SEC (ref 24.5–35.1)
BASOPHILS ABSOLUTE: 0.03 E9/L (ref 0–0.2)
BASOPHILS RELATIVE PERCENT: 0.1 % (ref 0–2)
BETA-HYDROXYBUTYRATE: 0.26 MMOL/L (ref 0.02–0.27)
BLOOD BANK DISPENSE STATUS: NORMAL
BLOOD BANK DISPENSE STATUS: NORMAL
BLOOD BANK PRODUCT CODE: NORMAL
BLOOD BANK PRODUCT CODE: NORMAL
BPU ID: NORMAL
BPU ID: NORMAL
BUN BLDV-MCNC: 112 MG/DL (ref 8–23)
BUN BLDV-MCNC: 73 MG/DL (ref 8–23)
BUN BLDV-MCNC: 87 MG/DL (ref 8–23)
BUN BLDV-MCNC: 98 MG/DL (ref 8–23)
BURR CELLS: ABNORMAL
CALCIUM SERPL-MCNC: 7.9 MG/DL (ref 8.6–10.2)
CALCIUM SERPL-MCNC: 8.1 MG/DL (ref 8.6–10.2)
CALCIUM SERPL-MCNC: 8.2 MG/DL (ref 8.6–10.2)
CALCIUM SERPL-MCNC: 8.5 MG/DL (ref 8.6–10.2)
CHLORIDE BLD-SCNC: 103 MMOL/L (ref 98–107)
CHLORIDE BLD-SCNC: 104 MMOL/L (ref 98–107)
CHLORIDE BLD-SCNC: 105 MMOL/L (ref 98–107)
CHLORIDE BLD-SCNC: 106 MMOL/L (ref 98–107)
CHOLESTEROL, TOTAL: 107 MG/DL (ref 0–199)
CO2: 15 MMOL/L (ref 22–29)
CO2: 19 MMOL/L (ref 22–29)
CO2: 22 MMOL/L (ref 22–29)
CO2: 25 MMOL/L (ref 22–29)
CREAT SERPL-MCNC: 1.7 MG/DL (ref 0.5–1)
CREAT SERPL-MCNC: 1.9 MG/DL (ref 0.5–1)
CREAT SERPL-MCNC: 2.1 MG/DL (ref 0.5–1)
CREAT SERPL-MCNC: 2.3 MG/DL (ref 0.5–1)
DESCRIPTION BLOOD BANK: NORMAL
DESCRIPTION BLOOD BANK: NORMAL
EOSINOPHILS ABSOLUTE: 0.01 E9/L (ref 0.05–0.5)
EOSINOPHILS RELATIVE PERCENT: 0 % (ref 0–6)
GFR AFRICAN AMERICAN: 24
GFR AFRICAN AMERICAN: 27
GFR AFRICAN AMERICAN: 30
GFR AFRICAN AMERICAN: 35
GFR NON-AFRICAN AMERICAN: 20 ML/MIN/1.73
GFR NON-AFRICAN AMERICAN: 22 ML/MIN/1.73
GFR NON-AFRICAN AMERICAN: 25 ML/MIN/1.73
GFR NON-AFRICAN AMERICAN: 29 ML/MIN/1.73
GLUCOSE BLD-MCNC: 171 MG/DL (ref 74–99)
GLUCOSE BLD-MCNC: 234 MG/DL (ref 74–99)
GLUCOSE BLD-MCNC: 239 MG/DL (ref 74–99)
GLUCOSE BLD-MCNC: 269 MG/DL (ref 74–99)
HBA1C MFR BLD: 5.9 % (ref 4–5.6)
HCT VFR BLD CALC: 21.7 % (ref 34–48)
HCT VFR BLD CALC: 21.9 % (ref 34–48)
HCT VFR BLD CALC: 23 % (ref 34–48)
HCT VFR BLD CALC: 23.3 % (ref 34–48)
HCT VFR BLD CALC: 25.2 % (ref 34–48)
HDLC SERPL-MCNC: 18 MG/DL
HEMOGLOBIN: 7.5 G/DL (ref 11.5–15.5)
HEMOGLOBIN: 7.6 G/DL (ref 11.5–15.5)
HEMOGLOBIN: 8 G/DL (ref 11.5–15.5)
HEMOGLOBIN: 8.2 G/DL (ref 11.5–15.5)
HEMOGLOBIN: 8.4 G/DL (ref 11.5–15.5)
IMMATURE GRANULOCYTES #: 0.27 E9/L
IMMATURE GRANULOCYTES %: 1.2 % (ref 0–5)
INR BLD: 1.1
LACTIC ACID: 1 MMOL/L (ref 0.5–2.2)
LACTIC ACID: 1.5 MMOL/L (ref 0.5–2.2)
LACTIC ACID: 1.7 MMOL/L (ref 0.5–2.2)
LACTIC ACID: 2.7 MMOL/L (ref 0.5–2.2)
LDL CHOLESTEROL CALCULATED: 36 MG/DL (ref 0–99)
LYMPHOCYTES ABSOLUTE: 1.03 E9/L (ref 1.5–4)
LYMPHOCYTES RELATIVE PERCENT: 4.5 % (ref 20–42)
MAGNESIUM: 1.3 MG/DL (ref 1.6–2.6)
MCH RBC QN AUTO: 29.9 PG (ref 26–35)
MCHC RBC AUTO-ENTMCNC: 33.3 % (ref 32–34.5)
MCV RBC AUTO: 89.7 FL (ref 80–99.9)
METER GLUCOSE: 164 MG/DL (ref 74–99)
METER GLUCOSE: 249 MG/DL (ref 74–99)
METER GLUCOSE: 256 MG/DL (ref 74–99)
METER GLUCOSE: 267 MG/DL (ref 74–99)
MONOCYTES ABSOLUTE: 1.53 E9/L (ref 0.1–0.95)
MONOCYTES RELATIVE PERCENT: 6.7 % (ref 2–12)
NEUTROPHILS ABSOLUTE: 20.05 E9/L (ref 1.8–7.3)
NEUTROPHILS RELATIVE PERCENT: 87.5 % (ref 43–80)
OCCULT BLOOD DIAGNOSTIC: NORMAL
OVALOCYTES: ABNORMAL
PDW BLD-RTO: 14.6 FL (ref 11.5–15)
PHOSPHORUS: 3.2 MG/DL (ref 2.5–4.5)
PLATELET # BLD: 159 E9/L (ref 130–450)
PMV BLD AUTO: 9.1 FL (ref 7–12)
POIKILOCYTES: ABNORMAL
POLYCHROMASIA: ABNORMAL
POTASSIUM SERPL-SCNC: 3.3 MMOL/L (ref 3.5–5)
POTASSIUM SERPL-SCNC: 3.4 MMOL/L (ref 3.5–5)
POTASSIUM SERPL-SCNC: 3.7 MMOL/L (ref 3.5–5)
POTASSIUM SERPL-SCNC: 4.3 MMOL/L (ref 3.5–5)
PROTHROMBIN TIME: 12.1 SEC (ref 9.3–12.4)
RBC # BLD: 2.81 E12/L (ref 3.5–5.5)
SODIUM BLD-SCNC: 136 MMOL/L (ref 132–146)
SODIUM BLD-SCNC: 137 MMOL/L (ref 132–146)
SODIUM BLD-SCNC: 141 MMOL/L (ref 132–146)
SODIUM BLD-SCNC: 142 MMOL/L (ref 132–146)
TRIGL SERPL-MCNC: 263 MG/DL (ref 0–149)
TROPONIN: <0.01 NG/ML (ref 0–0.03)
VLDLC SERPL CALC-MCNC: 53 MG/DL
WBC # BLD: 22.9 E9/L (ref 4.5–11.5)

## 2020-08-30 PROCEDURE — 85610 PROTHROMBIN TIME: CPT

## 2020-08-30 PROCEDURE — 80061 LIPID PANEL: CPT

## 2020-08-30 PROCEDURE — 2580000003 HC RX 258: Performed by: INTERNAL MEDICINE

## 2020-08-30 PROCEDURE — C9113 INJ PANTOPRAZOLE SODIUM, VIA: HCPCS | Performed by: HOSPITALIST

## 2020-08-30 PROCEDURE — 2500000003 HC RX 250 WO HCPCS: Performed by: HOSPITALIST

## 2020-08-30 PROCEDURE — 3700000001 HC ADD 15 MINUTES (ANESTHESIA): Performed by: INTERNAL MEDICINE

## 2020-08-30 PROCEDURE — 6360000002 HC RX W HCPCS: Performed by: NURSE ANESTHETIST, CERTIFIED REGISTERED

## 2020-08-30 PROCEDURE — 2709999900 HC NON-CHARGEABLE SUPPLY: Performed by: INTERNAL MEDICINE

## 2020-08-30 PROCEDURE — 2500000003 HC RX 250 WO HCPCS: Performed by: NURSE ANESTHETIST, CERTIFIED REGISTERED

## 2020-08-30 PROCEDURE — 2000000000 HC ICU R&B

## 2020-08-30 PROCEDURE — 99232 SBSQ HOSP IP/OBS MODERATE 35: CPT | Performed by: SURGERY

## 2020-08-30 PROCEDURE — 0DJ08ZZ INSPECTION OF UPPER INTESTINAL TRACT, VIA NATURAL OR ARTIFICIAL OPENING ENDOSCOPIC: ICD-10-PCS | Performed by: INTERNAL MEDICINE

## 2020-08-30 PROCEDURE — 2500000003 HC RX 250 WO HCPCS: Performed by: INTERNAL MEDICINE

## 2020-08-30 PROCEDURE — 3700000000 HC ANESTHESIA ATTENDED CARE: Performed by: INTERNAL MEDICINE

## 2020-08-30 PROCEDURE — 6370000000 HC RX 637 (ALT 250 FOR IP): Performed by: NURSE PRACTITIONER

## 2020-08-30 PROCEDURE — 3609013000 HC EGD TRANSORAL CONTROL BLEEDING ANY METHOD: Performed by: INTERNAL MEDICINE

## 2020-08-30 PROCEDURE — 80048 BASIC METABOLIC PNL TOTAL CA: CPT

## 2020-08-30 PROCEDURE — 2580000003 HC RX 258: Performed by: NURSE ANESTHETIST, CERTIFIED REGISTERED

## 2020-08-30 PROCEDURE — 83605 ASSAY OF LACTIC ACID: CPT

## 2020-08-30 PROCEDURE — 6360000002 HC RX W HCPCS: Performed by: INTERNAL MEDICINE

## 2020-08-30 PROCEDURE — 36415 COLL VENOUS BLD VENIPUNCTURE: CPT

## 2020-08-30 PROCEDURE — 85730 THROMBOPLASTIN TIME PARTIAL: CPT

## 2020-08-30 PROCEDURE — 83735 ASSAY OF MAGNESIUM: CPT

## 2020-08-30 PROCEDURE — 85025 COMPLETE CBC W/AUTO DIFF WBC: CPT

## 2020-08-30 PROCEDURE — 82962 GLUCOSE BLOOD TEST: CPT

## 2020-08-30 PROCEDURE — 85018 HEMOGLOBIN: CPT

## 2020-08-30 PROCEDURE — 83036 HEMOGLOBIN GLYCOSYLATED A1C: CPT

## 2020-08-30 PROCEDURE — 36592 COLLECT BLOOD FROM PICC: CPT

## 2020-08-30 PROCEDURE — 84100 ASSAY OF PHOSPHORUS: CPT

## 2020-08-30 PROCEDURE — 6360000002 HC RX W HCPCS: Performed by: HOSPITALIST

## 2020-08-30 PROCEDURE — 3609018200 HC EGD INJECTION SCLEROSIS ESOPHGL/GASTRIC VARICES: Performed by: INTERNAL MEDICINE

## 2020-08-30 PROCEDURE — 2580000003 HC RX 258: Performed by: HOSPITALIST

## 2020-08-30 PROCEDURE — 85014 HEMATOCRIT: CPT

## 2020-08-30 PROCEDURE — 2700000000 HC OXYGEN THERAPY PER DAY

## 2020-08-30 RX ORDER — SODIUM CHLORIDE 9 MG/ML
INJECTION, SOLUTION INTRAVENOUS CONTINUOUS PRN
Status: DISCONTINUED | OUTPATIENT
Start: 2020-08-30 | End: 2020-08-30 | Stop reason: SDUPTHER

## 2020-08-30 RX ORDER — SUCCINYLCHOLINE/SOD CL,ISO/PF 200MG/10ML
SYRINGE (ML) INTRAVENOUS PRN
Status: DISCONTINUED | OUTPATIENT
Start: 2020-08-30 | End: 2020-08-30 | Stop reason: SDUPTHER

## 2020-08-30 RX ORDER — PROPOFOL 10 MG/ML
INJECTION, EMULSION INTRAVENOUS PRN
Status: DISCONTINUED | OUTPATIENT
Start: 2020-08-30 | End: 2020-08-30 | Stop reason: SDUPTHER

## 2020-08-30 RX ORDER — EPINEPHRINE 1 MG/ML
INJECTION, SOLUTION, CONCENTRATE INTRAVENOUS PRN
Status: DISCONTINUED | OUTPATIENT
Start: 2020-08-30 | End: 2020-08-30 | Stop reason: ALTCHOICE

## 2020-08-30 RX ORDER — 0.9 % SODIUM CHLORIDE 0.9 %
20 INTRAVENOUS SOLUTION INTRAVENOUS ONCE
Status: COMPLETED | OUTPATIENT
Start: 2020-08-30 | End: 2020-08-30

## 2020-08-30 RX ORDER — LIDOCAINE HYDROCHLORIDE 20 MG/ML
INJECTION, SOLUTION INTRAVENOUS PRN
Status: DISCONTINUED | OUTPATIENT
Start: 2020-08-30 | End: 2020-08-30 | Stop reason: SDUPTHER

## 2020-08-30 RX ORDER — DEXAMETHASONE SODIUM PHOSPHATE 10 MG/ML
INJECTION, SOLUTION INTRAMUSCULAR; INTRAVENOUS PRN
Status: DISCONTINUED | OUTPATIENT
Start: 2020-08-30 | End: 2020-08-30 | Stop reason: SDUPTHER

## 2020-08-30 RX ORDER — FENTANYL CITRATE 50 UG/ML
INJECTION, SOLUTION INTRAMUSCULAR; INTRAVENOUS PRN
Status: DISCONTINUED | OUTPATIENT
Start: 2020-08-30 | End: 2020-08-30 | Stop reason: SDUPTHER

## 2020-08-30 RX ORDER — ONDANSETRON 2 MG/ML
INJECTION INTRAMUSCULAR; INTRAVENOUS PRN
Status: DISCONTINUED | OUTPATIENT
Start: 2020-08-30 | End: 2020-08-30 | Stop reason: SDUPTHER

## 2020-08-30 RX ADMIN — SODIUM CHLORIDE: 9 INJECTION, SOLUTION INTRAVENOUS at 12:17

## 2020-08-30 RX ADMIN — INSULIN LISPRO 4 UNITS: 100 INJECTION, SOLUTION INTRAVENOUS; SUBCUTANEOUS at 17:33

## 2020-08-30 RX ADMIN — METRONIDAZOLE 500 MG: 500 INJECTION, SOLUTION INTRAVENOUS at 04:10

## 2020-08-30 RX ADMIN — INSULIN LISPRO 3 UNITS: 100 INJECTION, SOLUTION INTRAVENOUS; SUBCUTANEOUS at 20:11

## 2020-08-30 RX ADMIN — SODIUM CHLORIDE 8 MG/HR: 9 INJECTION, SOLUTION INTRAVENOUS at 18:36

## 2020-08-30 RX ADMIN — FENTANYL CITRATE 25 MCG: 50 INJECTION, SOLUTION INTRAMUSCULAR; INTRAVENOUS at 22:13

## 2020-08-30 RX ADMIN — PROPOFOL 30 MG: 10 INJECTION, EMULSION INTRAVENOUS at 12:41

## 2020-08-30 RX ADMIN — DEXAMETHASONE SODIUM PHOSPHATE 10 MG: 10 INJECTION, SOLUTION INTRAMUSCULAR; INTRAVENOUS at 12:29

## 2020-08-30 RX ADMIN — WATER 1 G: 1 INJECTION INTRAMUSCULAR; INTRAVENOUS; SUBCUTANEOUS at 04:58

## 2020-08-30 RX ADMIN — LIDOCAINE HYDROCHLORIDE 80 MG: 20 INJECTION, SOLUTION INTRAVENOUS at 12:24

## 2020-08-30 RX ADMIN — SODIUM CHLORIDE 20 ML: 0.9 INJECTION, SOLUTION INTRAVENOUS at 02:30

## 2020-08-30 RX ADMIN — Medication 300 UNITS: at 09:37

## 2020-08-30 RX ADMIN — METRONIDAZOLE 500 MG: 500 INJECTION, SOLUTION INTRAVENOUS at 19:53

## 2020-08-30 RX ADMIN — PROPOFOL 80 MG: 10 INJECTION, EMULSION INTRAVENOUS at 12:24

## 2020-08-30 RX ADMIN — PHENYLEPHRINE HYDROCHLORIDE 100 MCG: 10 INJECTION INTRAVENOUS at 12:28

## 2020-08-30 RX ADMIN — PROPOFOL 30 MG: 10 INJECTION, EMULSION INTRAVENOUS at 12:34

## 2020-08-30 RX ADMIN — FENTANYL CITRATE 25 MCG: 50 INJECTION, SOLUTION INTRAMUSCULAR; INTRAVENOUS at 05:48

## 2020-08-30 RX ADMIN — ONDANSETRON 4 MG: 2 INJECTION INTRAMUSCULAR; INTRAVENOUS at 12:29

## 2020-08-30 RX ADMIN — FENTANYL CITRATE 50 MCG: 50 INJECTION, SOLUTION INTRAMUSCULAR; INTRAVENOUS at 12:24

## 2020-08-30 RX ADMIN — METRONIDAZOLE 500 MG: 500 INJECTION, SOLUTION INTRAVENOUS at 13:00

## 2020-08-30 RX ADMIN — INSULIN LISPRO 6 UNITS: 100 INJECTION, SOLUTION INTRAVENOUS; SUBCUTANEOUS at 08:03

## 2020-08-30 RX ADMIN — SODIUM BICARBONATE: 84 INJECTION, SOLUTION INTRAVENOUS at 06:25

## 2020-08-30 RX ADMIN — Medication 100 MG: at 12:24

## 2020-08-30 RX ADMIN — SODIUM BICARBONATE: 84 INJECTION, SOLUTION INTRAVENOUS at 17:44

## 2020-08-30 RX ADMIN — INSULIN GLARGINE 20 UNITS: 100 INJECTION, SOLUTION SUBCUTANEOUS at 20:10

## 2020-08-30 RX ADMIN — FENTANYL CITRATE 25 MCG: 50 INJECTION, SOLUTION INTRAMUSCULAR; INTRAVENOUS at 00:02

## 2020-08-30 RX ADMIN — Medication 300 UNITS: at 20:28

## 2020-08-30 RX ADMIN — SODIUM CHLORIDE 8 MG/HR: 9 INJECTION, SOLUTION INTRAVENOUS at 09:15

## 2020-08-30 RX ADMIN — FENTANYL CITRATE 25 MCG: 50 INJECTION, SOLUTION INTRAMUSCULAR; INTRAVENOUS at 09:37

## 2020-08-30 RX ADMIN — FENTANYL CITRATE 25 MCG: 50 INJECTION, SOLUTION INTRAMUSCULAR; INTRAVENOUS at 12:41

## 2020-08-30 ASSESSMENT — PULMONARY FUNCTION TESTS
PIF_VALUE: 12
PIF_VALUE: 0
PIF_VALUE: 10
PIF_VALUE: 2
PIF_VALUE: 1
PIF_VALUE: 2
PIF_VALUE: 13
PIF_VALUE: 2
PIF_VALUE: 22
PIF_VALUE: 12
PIF_VALUE: 13
PIF_VALUE: 0
PIF_VALUE: 0
PIF_VALUE: 7
PIF_VALUE: 12
PIF_VALUE: 10
PIF_VALUE: 9
PIF_VALUE: 0
PIF_VALUE: 7
PIF_VALUE: 17
PIF_VALUE: 11
PIF_VALUE: 1
PIF_VALUE: 2
PIF_VALUE: 0
PIF_VALUE: 12
PIF_VALUE: 1
PIF_VALUE: 26
PIF_VALUE: 1
PIF_VALUE: 11
PIF_VALUE: 0

## 2020-08-30 ASSESSMENT — PAIN SCALES - GENERAL
PAINLEVEL_OUTOF10: 8
PAINLEVEL_OUTOF10: 8
PAINLEVEL_OUTOF10: 7
PAINLEVEL_OUTOF10: 0
PAINLEVEL_OUTOF10: 7

## 2020-08-30 ASSESSMENT — PAIN SCALES - WONG BAKER
WONGBAKER_NUMERICALRESPONSE: 0
WONGBAKER_NUMERICALRESPONSE: 0

## 2020-08-30 NOTE — INTERVAL H&P NOTE
Patient's H&P was reviewed from admission date    /68   Pulse 86   Temp 97.8 °F (36.6 °C) (Infrared)   Resp 25   Ht 5' 6\" (1.676 m)   Wt 149 lb 14.4 oz (68 kg)   SpO2 97%   BMI 24.19 kg/m²      Patient examined. Case was also discussed with Patient's daughter and Anesthesia this morning. Discussed procedure and risks and benefits of the procedure. Plan:   EGD under general anesthesia, given remote non-displaced C2 fracture. Pt will be supine, patient to remain in soft collar with no rotation of the head or neck.

## 2020-08-30 NOTE — PROGRESS NOTES
Dr Martina Butts perfectserved with updated labs. Read at 0114. No new orders received.          Results for Kei Zayas (MRN [id number]) as of 8/30/2020 00:52 8/29/2020 23:52 Sodium: 136 Potassium: 4.3 Chloride: 106 CO2: 15 (L) BUN: 112 (H) Creatinine: 2.3 (H) Anion Gap: 15 GFR Non-: 20 GFR African American: 24 Lactic Acid: 1.5 Glucose: 234 (H) Calcium: 8.5 (L) Results for Kei Zayas (MRN [id number]) as of 8/30/2020 01:04 8/29/2020 23:52 Beta-Hydroxybutyrate: 0.26  Read 1:14 AM     8/30/20 1:08 AM   patients urine output is averaging 70cc/hour  Read 1:14 AM

## 2020-08-30 NOTE — PROCEDURES
Jenny Kovacs is a 81 y.o. female patient. 1. Gastrointestinal hemorrhage, unspecified gastrointestinal hemorrhage type    2. Lactic acidosis    3. Acute blood loss anemia    4. Hypotension due to hypovolemia    5. VINCENT (acute kidney injury) [facility])      Past Medical History:   Diagnosis Date    CAD (coronary artery disease)     COPD (chronic obstructive pulmonary disease) ([facility]     early stage    Diabetes mellitus (HCC)     Hyperlipidemia     Hypertension      Blood pressure (!) 110/59, pulse 77, temperature 97.6 °F (36.4 °C), temperature source Infrared, resp. rate 19, height 5' 6\" (1.676 m), weight 149 lb 14.4 oz (68 kg), SpO2 95 %. Procedures      Procedure:  Esophagogastroduodenoscopy    Indication:   Melana Acute Blood loss Anemia     Sedation: General endotracheal intubation, patient remained in cervical collar and supine during the entire procedure. Neither neck head or body was not manipulated throughout the entire procedure. Estimated Blood Loss: 0cc     Endoscope was advanced easily through mouth to second portion of duodenum      Oropharynx views are limited but grossly normal.    Esophagus:   Mucosa with LA Grade D esophagitis from 25cm down to 40cm  GEJ at 40 cm. Stomach:   Antrum with moderate-severe gastritis with peripyloric nodularity and erythema. Gastric body is normal.    Retroflexed views show normal fundus and cardia. 2 cm hiatal hernia was present with a  Small 2mm bradly lesion present. Duodenum: Bulb is with duodenitis and scattered erosions, in the proximal portion of the C loop a 1cm clean based ulcer was present, 3cc of 1: 10,000 epinephrine was injected around the ulcer with visible blanching. Tissue was quite edematous and inflammed     Second portion of duodenum is normal.    No fresh or old blood    IMPRESSION AND PLAN:     1. Mucosa with LA Grade D esophagitis from 25cm down to 40cm  GEJ at 40 cm.       2. Moderate-severe gastritis with peripyloric

## 2020-08-30 NOTE — PROGRESS NOTES
Nephrology Note  Piper Ly MD          Patient seen and examined. Patient's granddaughter is present in the room. Chart reviewed. Patient is somnolent. She just had an EGD study. Results noted. Patient is more awake and alert. Denies shortness of breath. . No nausea or dysguesia. Awake and alert . In no acute distress. Vital SignsBP (!) 110/59   Pulse 77   Temp 97.6 °F (36.4 °C) (Infrared)   Resp 19   Ht 5' 6\" (1.676 m)   Wt 149 lb 14.4 oz (68 kg)   SpO2 95%   BMI 24.19 kg/m²   24HR INTAKE/OUTPUT:    Intake/Output Summary (Last 24 hours) at 8/30/2020 1311  Last data filed at 8/30/2020 1249  Gross per 24 hour   Intake 2759 ml   Output 1545 ml   Net 1214 ml         Physical Exam  HEENT: Dry oral mucosa  Neck: No JVD  Lungs: Breath sounds decreased at the bases. No rales or ronchi. Heart: Regular rate and rhythm. No S3 gallop. Grade 2/6 systolic murmur along the left sternal border  Abdomen: Soft non distended, non tender and normal bowel sounds. Extremeties: No edema.       ROS:  RESPIRATORY:  negative  CARDIOVASCULAR:  negative  GASTROINTESTINAL:    GENITOURINARY:  negati status post EGD negativeve    Current Facility-Administered Medications   Medication Dose Route Frequency Provider Last Rate Last Dose    0.9 % sodium chloride bolus  20 mL Intravenous Once Jeromy Florence,         acetaminophen (TYLENOL) tablet 650 mg  650 mg Oral Q6H PRN Jacob Severino DO   650 mg at 08/29/20 1055    cefTRIAXone (ROCEPHIN) 1 g in sterile water 10 mL IV syringe  1 g Intravenous Q24H Darnell Lopez MD   1 g at 08/30/20 0458    metronidazole (FLAGYL) 500 mg in NaCl 100 mL IVPB premix  500 mg Intravenous Q8H Darnell Lopez  mL/hr at 08/30/20 1300 500 mg at 08/30/20 1300    pantoprazole (PROTONIX) 80 mg in sodium chloride 0.9 % 100 mL infusion  8 mg/hr Intravenous Continuous Darnell Lopez MD 10 mL/hr at 08/30/20 0915 8 mg/hr at 08/30/20 0915    glucose (GLUTOSE) 40 % oral gel 15 g  15 g Oral PRN Jamil Too, APRN - CNP        dextrose 50 % IV solution  12.5 g Intravenous PRN Jamil Too, APRN - CNP        glucagon (rDNA) injection 1 mg  1 mg Intramuscular PRN Jamil Too, APRN - CNP        dextrose 5 % solution  100 mL/hr Intravenous PRN Jamil Too, APRN - CNP        insulin lispro (HUMALOG) injection vial 0-12 Units  0-12 Units Subcutaneous TID WC Jamil Too, APRN - CNP   6 Units at 08/30/20 0803    insulin lispro (HUMALOG) injection vial 0-6 Units  0-6 Units Subcutaneous Nightly Jamil Too, APRN - CNP   2 Units at 08/29/20 2048    magnesium sulfate 1 g in dextrose 5% 100 mL IVPB  1 g Intravenous PRN Jamil Too, APRN - CNP        sodium phosphate 10.89 mmol in dextrose 5 % 250 mL IVPB  0.16 mmol/kg Intravenous PRN Jamil Too, APRN - CNP        Or    sodium phosphate 21.75 mmol in dextrose 5 % 500 mL IVPB  0.32 mmol/kg Intravenous PRN Jamil Too, APRN - CNP        potassium chloride 10 mEq/100 mL IVPB (Peripheral Line)  10 mEq Intravenous PRN Jamil Too, APRN - CNP        insulin glargine (LANTUS) injection vial 20 Units  20 Units Subcutaneous Nightly Jamil Too, APRN - CNP   20 Units at 08/29/20 2047    perflutren lipid microspheres (DEFINITY) injection 1.65 mg  1.5 mL Intravenous ONCE PRN Jamil Too, APRN - CNP        sodium bicarbonate 150 mEq in dextrose 5 % 1,000 mL IVPB   Intravenous Continuous Marin Ham Odonnell  mL/hr at 08/30/20 0703      sodium chloride flush 0.9 % injection 10 mL  10 mL Intravenous PRN Kevin Arriola, DO        heparin flush 100 UNIT/ML injection 300 Units  3 mL Intravenous 2 times per day Kevin Arriola DO   300 Units at 08/30/20 0222    heparin flush 100 UNIT/ML injection 300 Units  3 mL Intracatheter PRN Kevin Australian, DO        fentaNYL (SUBLIMAZE) injection 25 mcg  25 mcg Intravenous Q3H PRN Kevin Arriola, DO   25 mcg at 08/30/20 3161     Facility-Administered Medications Ordered in Other Encounters   Medication Dose Route Frequency Provider Last Rate Last Dose    0.9 % sodium chloride infusion    Continuous PRN Gilbert Boot, APRN - CRNA   Stopped at 08/30/20 1249    succinylcholine (ANECTINE) injection    PRN Gilbert Boot, APRN - CRNA   100 mg at 08/30/20 1224    fentaNYL (SUBLIMAZE) injection    PRN Idris Boot, APRN - CRNA   25 mcg at 08/30/20 1241    propofol injection    PRN Idris Boot, APRN - CRNA   30 mg at 08/30/20 1241    dexamethasone (PF) (DECADRON) injection    PRN Idris Boot, APRN - CRNA   10 mg at 08/30/20 1229    ondansetron [facility]) injection    PRN Gilbert Boot, APRN - CRNA   4 mg at 08/30/20 1229    lidocaine (cardiac) (XYLOCAINE) injection    PRN Idris Boot, APRN - CRNA   80 mg at 08/30/20 1224    phenylephrine (IVY-SYNEPHRINE) injection    PRN Idris Boot, APRN - CRNA   100 mcg at 08/30/20 1228       XR CHEST PORTABLE   Final Result   Central line with tip at the cavoatrial junction. Small right pleural effusion. No pneumothorax. XR CHEST PORTABLE   Final Result   An acute process is not appreciated. Rotated exam.         CT ABDOMEN PELVIS WO CONTRAST Additional Contrast? None   Final Result   No acute abdominopelvic findings. No substantial change in size or appearance of complex cyst at the right   hepatic dome. This could be proteinaceous or hemorrhagic although   multiphasic liver protocol MRI could further characterize if appropriate   based on patient's life expectancy. Colonic diverticulosis without evidence of diverticulitis. 3.5 cm fusiform infrarenal abdominal aortic aneurysm. See recommendations   for follow-up. RECOMMENDATIONS:   For management of fusiform AAA:      3.5-3.9 cm AAA, recommend follow-up every 2 years. References: Kourtney Smileyin Radiol 2013; 42(70):453-848; J Vasc Surg.  2018; 67:2-77               Labs:    CBC:   Recent Labs     08/29/20  0328 08/29/20  1410  08/30/20  0136 08/30/20  2983 08/30/20  1155   WBC 24.8* 27.7*  --   --  22.9*  --    HGB 10.2* 7.0*   < > 7.5* 8.4* 8.2*    218  --   --  159  --     < > = values in this interval not displayed. Lab Results   Component Value Date    IRON 73 08/29/2020    TIBC 243 (L) 08/29/2020    FERRITIN 183 08/29/2020       Lab Results   Component Value Date    CALCIUM 8.1 (L) 08/30/2020    CALCIUM 8.2 (L) 08/30/2020    CALCIUM 8.5 (L) 08/29/2020    PHOS 3.2 08/30/2020    MG 1.3 (L) 08/30/2020       BMP:   Recent Labs     08/29/20  2352 08/30/20  0529 08/30/20  1155    137 142   K 4.3 3.7 3.3*    103 105   CO2 15* 19* 22   * 98* 87*   CREATININE 2.3* 2.1* 1.9*   GLUCOSE 234* 269* 171*             Assessment: / Plan:    1. Acute kidney injury stage III. Acute kidney injury secondary renal hypoperfusion with volume depletion and concurrent use of diuretics . Urine studies also suggest renal hypoperfusion. Renal function has improved. 2.  Metabolic acidosis with lactic acidosis. Improved. Lactic acidosis due to acute kidney injury in the setting of use of metformin. 3.   Hyperkalemia. Hyperkalemia is resolved. Patient now with hypokalemia. Supplement potassium   cautiously. 4.  Hypomagnesemia. Supplement. 5.  Anemia. Iron studies adequate. Anemia of chronic kidney disease    6. Hyponatremia. Corrected. Discussed with granddaughter. Mario Cantor MD  Nephrology  Electronically signed by Mario Cantor MD on 8/30/2020 at 1:10 PM      Note: This report was completed utilizing computer voice recognition software. Every effort has been made to ensure accuracy, however; inadvertent computerized transcription errors may be present.

## 2020-08-30 NOTE — PROGRESS NOTES
Pulmonary/Critical Care Progress Note    We are following patient for Acute GI bleed with hemorrhagic shock. SUBJECTIVE:  81-year-old female who presented to the ICU for acute GI bleed (preseumed to be upper) w/ hemorrhagic shock and VINCENT. S/P blood transfusion and IVF. Hemodynamics are stable this AM. The patient reports to be thirsty. She denies acute abdominal pain but reports generalized aches. She has been NPO awaiting GI / ? EGD today. MEDICATIONS:   sodium chloride  20 mL Intravenous Once    cefTRIAXone (ROCEPHIN) IV  1 g Intravenous Q24H    metroNIDAZOLE  500 mg Intravenous Q8H    insulin lispro  0-12 Units Subcutaneous TID WC    insulin lispro  0-6 Units Subcutaneous Nightly    insulin glargine  20 Units Subcutaneous Nightly    heparin flush  3 mL Intravenous 2 times per day      pantoprozole (PROTONIX) infusion 8 mg/hr (08/30/20 0915)    dextrose      IVPB builder 100 mL/hr at 08/30/20 0703     acetaminophen, glucose, dextrose, glucagon (rDNA), dextrose, magnesium sulfate, sodium phosphate IVPB **OR** sodium phosphate IVPB, potassium chloride, perflutren lipid microspheres, sodium chloride flush, heparin flush, fentanNYL      REVIEW OF SYSTEMS:  Constitutional: Denies fever. + for fatigue  Skin: negative  HEENT: negative  Cardiovascular: negative  Respiratory: ribs pain from recent ribs fracture  Gastrointestinal: thirsty, no acute abdominal pain. No n/v  Genitourinary: negative  Musculoskeletal: generalized body aches, Recent C2 fracture. Neurological: no lightheadedness today   Psychological: tearful this AM / asking for water  Hematopoietic/Lymphatic: negative    OBJECTIVE:  Vitals:    08/30/20 0700   BP: 117/68   Pulse: 86   Resp: 25   Temp:    SpO2: 97%        O2 Flow Rate (L/min): 2 L/min  O2 Device: None (Room air)    PHYSICAL EXAM:  Constitutional: Chronically ill by appearance.  A+O X 2   Skin: Slightly pale not ischemic not cyanotic  HEENT: Patient has a soft cervical collar on pharynx. Pupils are equal and reactive to light. EOMI  Neck: Neck is supple no JVD  Cardiovascular: S1, S2, RRR, no murmurs  Respiratory: Clear overall no consolidation no focal wheezing  Gastrointestinal: Soft bowel sounds are present no peritoneal signs  and rectal exams deferred. Genitourinary: Deferred at this time  Extremities: No deformities degenerative changes are seen   Neurological: Patient is alert and oriented to person and place. Cranial nerves gross sensorimotor intact upper lower extremities. Patient is weak generalized nonfocal, gait was not observed. Psychological: Tearful and confused.  Mildly anxious.        LABS:  WBC   Date Value Ref Range Status   08/30/2020 22.9 (H) 4.5 - 11.5 E9/L Final   08/29/2020 27.7 (H) 4.5 - 11.5 E9/L Final   08/29/2020 24.8 (H) 4.5 - 11.5 E9/L Final     Hemoglobin   Date Value Ref Range Status   08/30/2020 8.4 (L) 11.5 - 15.5 g/dL Final   08/30/2020 7.5 (L) 11.5 - 15.5 g/dL Final   08/29/2020 7.3 (L) 11.5 - 15.5 g/dL Final     Hematocrit   Date Value Ref Range Status   08/30/2020 25.2 (L) 34.0 - 48.0 % Final   08/30/2020 21.7 (L) 34.0 - 48.0 % Final   08/29/2020 21.6 (L) 34.0 - 48.0 % Final     MCV   Date Value Ref Range Status   08/30/2020 89.7 80.0 - 99.9 fL Final   08/29/2020 91.6 80.0 - 99.9 fL Final   08/29/2020 91.3 80.0 - 99.9 fL Final     Platelets   Date Value Ref Range Status   08/30/2020 159 130 - 450 E9/L Final   08/29/2020 218 130 - 450 E9/L Final   08/29/2020 311 130 - 450 E9/L Final     Sodium   Date Value Ref Range Status   08/30/2020 137 132 - 146 mmol/L Final   08/29/2020 136 132 - 146 mmol/L Final   08/29/2020 133 132 - 146 mmol/L Final     Potassium   Date Value Ref Range Status   08/30/2020 3.7 3.5 - 5.0 mmol/L Final   08/29/2020 4.3 3.5 - 5.0 mmol/L Final   08/29/2020 4.7 3.5 - 5.0 mmol/L Final     Potassium reflex Magnesium   Date Value Ref Range Status   08/27/2020 5.0 3.5 - 5.0 mmol/L Final     Comment:     Specimen is moderately Hemolyzed. Result may be artificially increased. 08/26/2020 4.3 3.5 - 5.0 mmol/L Final   08/25/2020 4.3 3.5 - 5.0 mmol/L Final     Chloride   Date Value Ref Range Status   08/30/2020 103 98 - 107 mmol/L Final   08/29/2020 106 98 - 107 mmol/L Final   08/29/2020 104 98 - 107 mmol/L Final     CO2   Date Value Ref Range Status   08/30/2020 19 (L) 22 - 29 mmol/L Final   08/29/2020 15 (L) 22 - 29 mmol/L Final   08/29/2020 14 (L) 22 - 29 mmol/L Final     BUN   Date Value Ref Range Status   08/30/2020 98 (H) 8 - 23 mg/dL Final   08/29/2020 112 (H) 8 - 23 mg/dL Final   08/29/2020 119 (H) 8 - 23 mg/dL Final     CREATININE   Date Value Ref Range Status   08/30/2020 2.1 (H) 0.5 - 1.0 mg/dL Final   08/29/2020 2.3 (H) 0.5 - 1.0 mg/dL Final   08/29/2020 2.3 (H) 0.5 - 1.0 mg/dL Final     Glucose   Date Value Ref Range Status   08/30/2020 269 (H) 74 - 99 mg/dL Final   08/29/2020 234 (H) 74 - 99 mg/dL Final   08/29/2020 220 (H) 74 - 99 mg/dL Final     Calcium   Date Value Ref Range Status   08/30/2020 8.2 (L) 8.6 - 10.2 mg/dL Final   08/29/2020 8.5 (L) 8.6 - 10.2 mg/dL Final   08/29/2020 8.3 (L) 8.6 - 10.2 mg/dL Final     Total Protein   Date Value Ref Range Status   08/29/2020 7.7 6.4 - 8.3 g/dL Final   05/09/2019 7.7 6.4 - 8.3 g/dL Final     Alb   Date Value Ref Range Status   08/29/2020 3.4 (L) 3.5 - 5.2 g/dL Final   05/09/2019 4.2 3.5 - 5.2 g/dL Final     Total Bilirubin   Date Value Ref Range Status   08/29/2020 0.6 0.0 - 1.2 mg/dL Final   05/09/2019 0.5 0.0 - 1.2 mg/dL Final     Alkaline Phosphatase   Date Value Ref Range Status   08/29/2020 136 (H) 35 - 104 U/L Final     Comment:     Specimen is moderately Hemolyzed. Result may be artificially decreased. 05/09/2019 136 (H) 35 - 104 U/L Final     AST   Date Value Ref Range Status   08/29/2020 34 (H) 0 - 31 U/L Final     Comment:     Specimen is moderately Hemolyzed. Result may be artificially increased.    05/09/2019 20 0 - 31 U/L Final     ALT   Date Value Ref Range Status 08/29/2020 18 0 - 32 U/L Final     Comment:     Specimen is moderately Hemolyzed. Result may be artificially increased. 05/09/2019 20 0 - 32 U/L Final     GFR Non-   Date Value Ref Range Status   08/30/2020 22 >=60 mL/min/1.73 Final     Comment:     Chronic Kidney Disease: less than 60 ml/min/1.73 sq.m. Kidney Failure: less than 15 ml/min/1.73 sq.m. Results valid for patients 18 years and older. 08/29/2020 20 >=60 mL/min/1.73 Final     Comment:     Chronic Kidney Disease: less than 60 ml/min/1.73 sq.m. Kidney Failure: less than 15 ml/min/1.73 sq.m. Results valid for patients 18 years and older. 08/29/2020 20 >=60 mL/min/1.73 Final     Comment:     Chronic Kidney Disease: less than 60 ml/min/1.73 sq.m. Kidney Failure: less than 15 ml/min/1.73 sq.m. Results valid for patients 18 years and older. GFR    Date Value Ref Range Status   08/30/2020 27  Final   08/29/2020 24  Final   08/29/2020 24  Final     Magnesium   Date Value Ref Range Status   08/30/2020 1.3 (L) 1.6 - 2.6 mg/dL Final     Phosphorus   Date Value Ref Range Status   08/30/2020 3.2 2.5 - 4.5 mg/dL Final     No results for input(s): PH, PO2, PCO2, HCO3, BE, O2SAT in the last 72 hours. RADIOLOGY:  XR CHEST PORTABLE   Final Result   Central line with tip at the cavoatrial junction. Small right pleural effusion. No pneumothorax. XR CHEST PORTABLE   Final Result   An acute process is not appreciated. Rotated exam.         CT ABDOMEN PELVIS WO CONTRAST Additional Contrast? None   Final Result   No acute abdominopelvic findings. No substantial change in size or appearance of complex cyst at the right   hepatic dome. This could be proteinaceous or hemorrhagic although   multiphasic liver protocol MRI could further characterize if appropriate   based on patient's life expectancy. Colonic diverticulosis without evidence of diverticulitis.       3.5 cm fusiform infrarenal abdominal aortic aneurysm. See recommendations   for follow-up. RECOMMENDATIONS:   For management of fusiform AAA:      3.5-3.9 cm AAA, recommend follow-up every 2 years. References: Chris Praveena Radiol 2013; 69(52):021-584; J Vasc Surg. 2018; 67:2-77                 PROBLEM LIST:  Principal Problem:    GI bleed  Resolved Problems:    * No resolved hospital problems. *      IMPRESSION:  1. Acute GI bleed presumed to be upper  2. Hypovolemic shock versus sepsis   3. Acute kidney injury with hyperkalemia  4. Metabolic acidosis associated with hypovolemia and VINCENT  5. Recent cervical fracture and rib fracture. Already evaluated by Neurosurgery. 6. Infrarenal aneurysm on CT  7. Type 2 DM insulin dependent    Hemodynamically stable today. No melena noted. CXR is reviewed; no acute air space disease (limited film though)      PLAN:  1. Continue to monitor h/h every 8-12 hours  2. PPI gtt  3. Appreciate GI input. ? EGD today. 4. Continue on IVF (on bicarb gtt) for VINCENT. Can transition to D5 1/2NS after HCO3 gtt is complete later this PM.   5. SSI   6. Avoid nephrotoxins for now  7. Keep Neck collar at all times. The patient was recently evaluated by Neurosurgery and no interventions were indicated at current time. Fall precautions. ATTESTATION:  ICU Staff Physician note of personal involvement in Care  As the attending physician, I certify that I personally reviewed the patients history and personally examined the patient to confirm the physical findings described above,  And that I reviewed the relevant imaging studies and available reports. I also discussed the differential diagnosis and all of the proposed management plans with the patient and individuals accompanying the patient to this visit. They had the opportunity to ask questions about the proposed management plans and to have those questions answered.      This patient has a high probability of sudden, clinically significant deterioration, which requires the highest level of physician preparedness to intervene urgently. I managed/supervised life or organ supporting interventions that required frequent physician assessment. I devoted my full attention to the direct care of this patient for the amount of time indicated below. Time I spent with the family or surrogate(s) is included only if the patient was incapable of providing the necessary information or participating in medical decisions - Time devoted to teaching and to any procedures I billed separately is not included.     CRITICAL CARE TIME:  28    Electronically signed by Pacheco Hassan MD on 8/30/2020 at 11:50 AM

## 2020-08-30 NOTE — PROGRESS NOTES
General Surgery Progress Note  [facility]    Patient's Name/Date of Birth: Jenny Kovacs / [id number]    Date: August 30, 2020     Surgeon: Timoteo Cash MD    Chief Complaint: GI bleed    Patient Active Problem List   Diagnosis    Fracture of left humerus    Hypercholesterolemia    Systolic hypertension    History of coronary artery bypass graft    COPD (chronic obstructive pulmonary disease) [facility]    Non-insulin dependent type 2 diabetes mellitus ([facility]    Fall from standing    Closed nondisplaced fracture of second cervical vertebra ([facility]    right 3rd-5th rib and left 9th rib fractures.  Pulmonary nodule, right- Incidental     Liver cyst- Incidental     GI bleed       Subjective: Overnight events noted. No overt GI bleed. Awake and alert     Objective:  /68   Pulse 86   Temp 97.8 °F (36.6 °C) (Infrared)   Resp 25   Ht 5' 6\" (1.676 m)   Wt 149 lb 14.4 oz (68 kg)   SpO2 97%   BMI 24.19 kg/m²   Labs:  Recent Labs     08/29/20  0328 08/29/20  1410 08/29/20  1555 08/30/20  0136 08/30/20  0529   WBC 24.8* 27.7*  --   --  22.9*   HGB 10.2* 7.0* 7.3* 7.5* 8.4*   HCT 30.4* 20.8* 21.6* 21.7* 25.2*     Lab Results   Component Value Date    CREATININE 2.1 (H) 08/30/2020    BUN 98 (H) 08/30/2020     08/30/2020    K 3.7 08/30/2020     08/30/2020    CO2 19 (L) 08/30/2020     No results for input(s): LIPASE, AMYLASE in the last 72 hours.   CBC:   Lab Results   Component Value Date    WBC 22.9 08/30/2020    RBC 2.81 08/30/2020    HGB 8.4 08/30/2020    HCT 25.2 08/30/2020    MCV 89.7 08/30/2020    MCH 29.9 08/30/2020    MCHC 33.3 08/30/2020    RDW 14.6 08/30/2020     08/30/2020    MPV 9.1 08/30/2020     BMP:    Lab Results   Component Value Date     08/30/2020    K 3.7 08/30/2020    K 5.0 08/27/2020     08/30/2020    CO2 19 08/30/2020    BUN 98 08/30/2020    LABALBU 3.4 08/29/2020    CREATININE 2.1 08/30/2020    CALCIUM 8.2 08/30/2020    GFRAA 27 08/30/2020 LABGLOM 22 08/30/2020    GLUCOSE 269 08/30/2020       General appearance:  NAD  Head: NCAT, PERRLA, EOMI, red conjunctiva  Neck: supple, no masses  Lungs: CTAB, equal chest rise bilateral  Heart: Reg rate  Abdomen: soft, nondistended, nontender  Skin; no lesions  Gu: no cva tenderness  Extremities: extremities normal, atraumatic, no cyanosis or edema      Assessment/Plan:  Perez Garcia is a 81 y.o. female with GI bleed    For EGD today per GI  Monitor h/h and transfuse prn  Will follow    Flor Pairkh MD  8/30/2020  12:01 PM

## 2020-08-30 NOTE — PROGRESS NOTES
Internal Medicine Progress Note    [facility]    Grady Corrie. Jose Manuel Tavares., JV.[email or web address].[email or web address]. Velma Martinez D.O., KELIN Meléndez, MSN, APRN, NP-C  Lester Mckeon. Belinda Srivastava, MSN, APRN-CNP     Primary Care Physician: No primary care provider on file. Admitting Physician:  Melody Savage DO  Admission date and time: 8/29/2020  3:07 AM    Room:  [place] [address]  Admitting diagnosis: GI bleed [K92.2]    Patient Name: Keisha Weston  MRN: [id number]    Date of Service: 8/30/2020     Subjective:  Angela Clark is a 81 y.o. female who was seen and examined today,8/30/2020, at the bedside. The patient is significantly more animated today and has improved dramatically when compared to my examination yesterday. Her granddaughter is present during the examination and updated accordingly. She responded accordingly to packed red blood cell transfusion and is scheduled to undergo EGD evaluation today. Review of System:   Constitutional:   Admits to generalized malaise and fatigue. HEENT:   Denies ear pain, sore throat, sinus or eye problems. Admits to neck pain associated with her recent cervical fracture. Admits to discomfort associated with the neck brace. Cardiovascular:   Denies any chest pain, irregular heartbeats, or palpitations. Respiratory:   Denies shortness of breath, coughing, sputum production, hemoptysis, or wheezing. Gastrointestinal:   Admits to being hungry. No nausea or vomiting. Genitourinary:    Denies any urgency, frequency, hematuria. Voiding with the use of a Gonzalez catheter. Extremities:   Denies lower extremity swelling, edema or cyanosis. Neurology:    Denies any headache or focal neurological deficits, Denies generalized weakness or memory difficulty. Psch:   Denies being anxious or depressed. Musculoskeletal:    Denies  myalgias, joint complaints or back pain.    Integumentary:   Denies any rashes, ulcers, or excoriations. Denies bruising. Hematologic/Lymphatic:  Denies bruising or bleeding. Physical Exam:  No intake/output data recorded. Intake/Output Summary (Last 24 hours) at 8/30/2020 1131  Last data filed at 8/30/2020 0648  Gross per 24 hour   Intake 2459 ml   Output 1545 ml   Net 914 ml   I/O last 3 completed shifts: In: 2459 [I.V.:174; Blood:625; IV Piggyback:1660]  Out: 1928 [GFAQT:6413]  Patient Vitals for the past 96 hrs (Last 3 readings):   Weight   08/29/20 1350 149 lb 14.4 oz (68 kg)   08/29/20 0308 150 lb (68 kg)     Vital Signs:   Blood pressure 117/68, pulse 86, temperature 97.8 °F (36.6 °C), temperature source Infrared, resp. rate 25, height 5' 6\" (1.676 m), weight 149 lb 14.4 oz (68 kg), SpO2 97 %. General appearance: For more awake and alert today. She is oriented x3. Head:  Normocephalic. No masses, lesions or tenderness. Eyes:  PERRLA. EOMI. Sclera clear. Buccal mucosa moist.  ENT:  Neck braces in place. Neck:    Supple. Trachea midline. No thyromegaly. No JVD. No bruits. Heart:    Rhythm regular. Rate controlled. No murmurs. Lungs:    Symmetrical. Clear to auscultation bilaterally. No wheezes. No rhonchi. No rales. Abdomen:   Soft. Non-tender. Non-distended. Bowel sounds positive. No organomegaly or masses. No pain on palpation. Extremities:    Peripheral pulses present. No peripheral edema. No ulcers. No cyanosis. No clubbing. Neurologic:    Alert x 3. No focal deficit. Cranial nerves grossly intact. No focal weakness. Psych:   Behavior is normal. Mood appears normal. Speech is not rapid and/or pressured. Musculoskeletal:   Spine ROM normal. Muscular strength intact. Gait not assessed. Integumentary:  No rashes  Skin normal color and texture. Genitalia/Breast:  Voiding with use of a Gonzalez catheter.     Medication:  Scheduled Meds:   sodium chloride  20 mL Intravenous Once    cefTRIAXone (ROCEPHIN) IV  1 g Intravenous Q24H    metroNIDAZOLE  500 mg Intravenous Q8H    insulin lispro  0-12 Units Subcutaneous TID WC    insulin lispro  0-6 Units Subcutaneous Nightly    insulin glargine  20 Units Subcutaneous Nightly    heparin flush  3 mL Intravenous 2 times per day     Continuous Infusions:   pantoprozole (PROTONIX) infusion 8 mg/hr (08/30/20 0915)    dextrose      IVPB builder 100 mL/hr at 08/30/20 0703       Objective Data:  CBC with Differential:    Lab Results   Component Value Date    WBC 22.9 08/30/2020    RBC 2.81 08/30/2020    HGB 8.4 08/30/2020    HCT 25.2 08/30/2020     08/30/2020    MCV 89.7 08/30/2020    MCH 29.9 08/30/2020    MCHC 33.3 08/30/2020    RDW 14.6 08/30/2020    LYMPHOPCT 4.5 08/30/2020    MONOPCT 6.7 08/30/2020    BASOPCT 0.1 08/30/2020    MONOSABS 1.53 08/30/2020    LYMPHSABS 1.03 08/30/2020    EOSABS 0.01 08/30/2020    BASOSABS 0.03 08/30/2020     BMP:    Lab Results   Component Value Date     08/30/2020    K 3.7 08/30/2020    K 5.0 08/27/2020     08/30/2020    CO2 19 08/30/2020    BUN 98 08/30/2020    LABALBU 3.4 08/29/2020    CREATININE 2.1 08/30/2020    CALCIUM 8.2 08/30/2020    GFRAA 27 08/30/2020    LABGLOM 22 08/30/2020    GLUCOSE 269 08/30/2020       Assessment:  1. Acute upper GI bleed with hemorrhagic anemia resulting in hypovolemic shock  2. Metabolic encephalopathy with improvement  3. Acute renal failure with associated hyperkalemia and metabolic acidosis  4. Recent cervical fracture requiring cervical bracing  with concomitant rib fracture  5. 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 Insulin-dependent diabetes mellitus type 2    Plan:   The patient remains on a Protonix infusion and following packed red blood cell transfusion, hemoglobin has stabilized. Plans are for EGD evaluation today. The patient's encephalopathy has improved dramatically. Renal function is being monitored accordingly and the nephrology team has provided consultation.   The patient is fixated on the institution of a diet and we will assess this option following endoscopic intervention. We will monitor chronic comorbidities. The patient's granddaughter was updated extensively at the bedside. Infectious work-up is being undertaken as well and pancultures are pending. Continue current therapy. See orders for further plan of care. Greater than 40 minutes of critical care time was spent with the patient. This time included chart review, , and discussion with those consultants involved in the patient's care. More than 50% of my  time was spent at the bedside counseling/coordinating care with the patient and/or family with face to face contact. This time was spent reviewing notes and laboratory data as well as instructing and counseling the patient. Time I spent with the family or surrogate(s) is included only if the patient was incapable of providing the necessary information or participating in medical decisions. I also discussed the differential diagnosis and all of the proposed management plans with the patient and individuals accompanying the patient. Chiquita requires this high level of physician care and nursing in the ICU due to complexity of decision management and chance of rapid decline or death. Continued cardiac monitoring and higher level of nursing are required. I am readily available for any further decision-making and intervention.      Maddie De Paz DO, SHELDON.C.O.I.  8/30/2020  11:31 AM

## 2020-08-30 NOTE — ANESTHESIA POSTPROCEDURE EVALUATION
Department of Anesthesiology  Postprocedure Note    Patient: Lisbeth Correia  MRN: [id number]  YOB: 1935  Date of evaluation: 8/30/2020  Time:  1:13 PM     Procedure Summary     Date:  08/30/20 Room / Location:  [address] [place] [address] / [address] [place] [address]    Anesthesia Start:  1217 Anesthesia Stop:  1468    Procedures:       EGD CONTROL HEMORRHAGE (N/A )      EGD ESOPHAGOGASTRODUODENOSCOPY SCLEROTHERAPY Diagnosis:  (GI Bleed)    Surgeon:  Holly Ansari DO Responsible Provider:  Roque Benavidez MD    Anesthesia Type:  MAC ASA Status:  3          Anesthesia Type: MAC    Prashanth Phase I:      Prashanth Phase II:      Last vitals: Reviewed and per EMR flowsheets.        Anesthesia Post Evaluation    Patient location during evaluation: PACU  Patient participation: complete - patient participated  Level of consciousness: awake  Pain score: 0  Airway patency: patent  Nausea & Vomiting: no nausea  Complications: no  Cardiovascular status: blood pressure returned to baseline  Respiratory status: acceptable  Hydration status: euvolemic

## 2020-08-30 NOTE — ANESTHESIA PRE PROCEDURE
Department of Anesthesiology  Preprocedure Note       Name:  Jenny Kovacs   Age:  81 y.o.  :  1935                                          MRN:  [id number]         Date:  2020      Surgeon: Abelardo Melara):  Jackie Vilchis DO    Procedure: Procedure(s):  EGD ESOPHAGOGASTRODUODENOSCOPY    Medications prior to admission:   Prior to Admission medications    Medication Sig Start Date End Date Taking? Authorizing Provider   busPIRone (BUSPAR) 5 MG tablet Take 5 mg by mouth 2 times daily    Historical Provider, MD   Magnesium Hydroxide (MILK OF MAGNESIA PO) Take 30 mLs by mouth daily as needed    Historical Provider, MD   aspirin 81 MG chewable tablet Take 1 tablet by mouth daily 20   NILTON Miller CNP   Heparin Sodium, Porcine, (HEPARIN, PORCINE,) 36089 UNIT/ML injection Inject 0.5 mLs into the skin every 8 hours 20   NILTON Miller CNP   insulin lispro (HUMALOG) 100 UNIT/ML injection vial Inject 0-12 Units into the skin 3 times daily (with meals) 20   NILTON Miller CNP   insulin lispro (HUMALOG) 100 UNIT/ML injection vial Inject 0-6 Units into the skin nightly 20   NILTON Miller CNP   lidocaine 4 % external patch Place 2 patches onto the skin daily 20   NILTON Miller CNP   bisacodyl (DULCOLAX) 10 MG suppository Place 1 suppository rectally daily as needed for Constipation 20  NILTON Miller CNP   sennosides-docusate sodium (SENOKOT-S) 8.6-50 MG tablet Take 1 tablet by mouth 2 times daily 20   NILTON Miller CNP   methocarbamol (ROBAXIN) 750 MG tablet Take 1 tablet by mouth 3 times daily for 10 days 20  NILTON Miller CNP   acetaminophen (TYLENOL) 325 MG tablet Take 650 mg by mouth every 6 hours as needed for Pain    Historical Provider, MD   insulin glargine (LANTUS) 100 UNIT/ML injection vial Inject 20 Units into the skin nightly.  1/20/15   Jyoti Hurtado MD   citalopram (CELEXA) 20 MG tablet Take 20 mg by mouth daily.    Historical Provider, MD   oxybutynin (DITROPAN) 5 MG tablet Take 5 mg by mouth nightly     Historical Provider, MD   calcium carbonate (OYSTER SHELL CALCIUM 500 MG) 1250 MG tablet Take 1 tablet by mouth daily. Historical Provider, MD   spironolactone (ALDACTONE) 50 MG tablet Take 50 mg by mouth daily. Historical Provider, MD   metFORMIN (GLUCOPHAGE) 1000 MG tablet Take 1,000 mg by mouth 2 times daily (with meals). Historical Provider, MD   glimepiride (AMARYL) 4 MG tablet Take 8 mg by mouth every morning (before breakfast). Historical Provider, MD       Current medications:    No current facility-administered medications for this visit. No current outpatient medications on file.      Facility-Administered Medications Ordered in Other Visits   Medication Dose Route Frequency Provider Last Rate Last Dose    0.9 % sodium chloride infusion    Continuous PRN Jolynn Andrae, APRN - CRNA   Stopped at 08/30/20 1249    succinylcholine (ANECTINE) injection    PRN Jolynn Andrae, APRN - CRNA   100 mg at 08/30/20 1224    fentaNYL (SUBLIMAZE) injection    PRN Jolynn Andrae, APRN - CRNA   25 mcg at 08/30/20 1241    propofol injection    PRN Jolynn Andrae, APRN - CRNA   30 mg at 08/30/20 1241    dexamethasone (PF) (DECADRON) injection    PRN Jolynn Andrae, APRN - CRNA   10 mg at 08/30/20 1229    ondansetron [facility]) injection    PRN Jolynn Andrae, APRN - CRNA   4 mg at 08/30/20 1229    lidocaine (cardiac) (XYLOCAINE) injection    PRN Jolynn Andrae, APRN - CRNA   80 mg at 08/30/20 1224    phenylephrine (IVY-SYNEPHRINE) injection    PRN Jolynn Andrae, APRN - CRNA   100 mcg at 08/30/20 1228    0.9 % sodium chloride bolus  20 mL Intravenous Once Woodruff Dadds, DO        acetaminophen (TYLENOL) tablet 650 mg  650 mg Oral Q6H PRN Paula Acunael, DO   650 mg at 08/29/20 1055    cefTRIAXone (ROCEPHIN) 1 g in sterile water 10 mL IV syringe  1 g Intravenous Q24H Vianey Kitchen MD   1 g at 08/30/20 UNIT/ML injection 300 Units  3 mL Intravenous 2 times per day Colie Pun, DO   300 Units at 20 9565    heparin flush 100 UNIT/ML injection 300 Units  3 mL Intracatheter PRN Colie Pun, DO        fentaNYL (SUBLIMAZE) injection 25 mcg  25 mcg Intravenous Q3H PRN Colie Pun, DO   25 mcg at 20 5878       Allergies: Allergies   Allergen Reactions    Naproxen Other (See Comments)     GI bleed    Nsaids Nausea And Vomiting    Vicodin [Hydrocodone-Acetaminophen] Nausea And Vomiting       Problem List:    Patient Active Problem List   Diagnosis Code    Fracture of left humerus S38.5A    Hypercholesterolemia T63.79    Systolic hypertension A64    History of coronary artery bypass graft Z95.1    COPD (chronic obstructive pulmonary disease) ([facility]) J44.9    Non-insulin dependent type 2 diabetes mellitus ([facility] [address] E11.9    Fall from standing W19. XXXA    Closed nondisplaced fracture of second cervical vertebra ([facility] [address] S12.101A    right 3rd-5th rib and left 9th rib fractures. S22.43XA    Pulmonary nodule, right- Incidental  R91.1    Liver cyst- Incidental  K76.89    GI bleed K92.2       Past Medical History:        Diagnosis Date    CAD (coronary artery disease)     COPD (chronic obstructive pulmonary disease) ([facility] [address]     early stage    Diabetes mellitus ([facility] [address]     Hyperlipidemia     Hypertension        Past Surgical History:        Procedure Laterality Date    ACHILLES TENDON SURGERY Left     CARDIAC SURGERY      COLONOSCOPY      CORONARY ARTERY BYPASS GRAFT  2013    EYE SURGERY Right     cataract    HYSTERECTOMY      OTHER SURGICAL HISTORY Right 7 7 14    DeQuervains tendonitis thumb       Social History:    Social History     Tobacco Use    Smoking status: Former Smoker     Years: 30.00     Last attempt to quit: 4/3/2013     Years since quittin.4    Smokeless tobacco: Never Used   Substance Use Topics    Alcohol use:  No                                Counseling given: ROS comment: Former Smoker. Cardiovascular:    (+) hypertension:, CAD:, CABG/stent:, hyperlipidemia      ECG reviewed  Rhythm: regular  Rate: normal                 ROS comment: EKG: Sinus Rhythm 98, with Paroxysmal Atrial Contractions. Neuro/Psych:   Negative Neuro/Psych ROS              GI/Hepatic/Renal:   (+) liver disease:,           Endo/Other:    (+) DiabetesType II DM, , .                 Abdominal:           Vascular: negative vascular ROS. Anesthesia Plan      MAC     ASA 3       Induction: intravenous. Anesthetic plan and risks discussed with patient. Plan discussed with CRNA.     Attending anesthesiologist reviewed and agrees with Myles Sandhoff, MD   8/30/2020

## 2020-08-31 LAB
ANION GAP SERPL CALCULATED.3IONS-SCNC: 12 MMOL/L (ref 7–16)
ANION GAP SERPL CALCULATED.3IONS-SCNC: 14 MMOL/L (ref 7–16)
ANION GAP SERPL CALCULATED.3IONS-SCNC: 14 MMOL/L (ref 7–16)
BACTERIA: ABNORMAL /HPF
BASOPHILS ABSOLUTE: 0.02 E9/L (ref 0–0.2)
BASOPHILS RELATIVE PERCENT: 0.1 % (ref 0–2)
BILIRUBIN URINE: NEGATIVE
BLOOD, URINE: ABNORMAL
BUN BLDV-MCNC: 46 MG/DL (ref 8–23)
BUN BLDV-MCNC: 56 MG/DL (ref 8–23)
BUN BLDV-MCNC: 65 MG/DL (ref 8–23)
CALCIUM SERPL-MCNC: 7.8 MG/DL (ref 8.6–10.2)
CALCIUM SERPL-MCNC: 7.9 MG/DL (ref 8.6–10.2)
CALCIUM SERPL-MCNC: 8.3 MG/DL (ref 8.6–10.2)
CHLORIDE BLD-SCNC: 100 MMOL/L (ref 98–107)
CHLORIDE BLD-SCNC: 103 MMOL/L (ref 98–107)
CHLORIDE BLD-SCNC: 104 MMOL/L (ref 98–107)
CLARITY: CLEAR
CO2: 25 MMOL/L (ref 22–29)
CO2: 25 MMOL/L (ref 22–29)
CO2: 27 MMOL/L (ref 22–29)
COLOR: YELLOW
CREAT SERPL-MCNC: 1.2 MG/DL (ref 0.5–1)
CREAT SERPL-MCNC: 1.4 MG/DL (ref 0.5–1)
CREAT SERPL-MCNC: 1.6 MG/DL (ref 0.5–1)
EOSINOPHILS ABSOLUTE: 0 E9/L (ref 0.05–0.5)
EOSINOPHILS RELATIVE PERCENT: 0 % (ref 0–6)
EPITHELIAL CELLS, UA: ABNORMAL /HPF
GFR AFRICAN AMERICAN: 37
GFR AFRICAN AMERICAN: 43
GFR AFRICAN AMERICAN: 52
GFR NON-AFRICAN AMERICAN: 31 ML/MIN/1.73
GFR NON-AFRICAN AMERICAN: 36 ML/MIN/1.73
GFR NON-AFRICAN AMERICAN: 43 ML/MIN/1.73
GLUCOSE BLD-MCNC: 129 MG/DL (ref 74–99)
GLUCOSE BLD-MCNC: 216 MG/DL (ref 74–99)
GLUCOSE BLD-MCNC: 241 MG/DL (ref 74–99)
GLUCOSE URINE: 250 MG/DL
HCT VFR BLD CALC: 22.1 % (ref 34–48)
HCT VFR BLD CALC: 22.1 % (ref 34–48)
HCT VFR BLD CALC: 22.6 % (ref 34–48)
HEMOGLOBIN: 7.2 G/DL (ref 11.5–15.5)
HEMOGLOBIN: 7.4 G/DL (ref 11.5–15.5)
HEMOGLOBIN: 7.6 G/DL (ref 11.5–15.5)
IMMATURE GRANULOCYTES #: 0.15 E9/L
IMMATURE GRANULOCYTES %: 0.9 % (ref 0–5)
KETONES, URINE: NEGATIVE MG/DL
LACTIC ACID: 1.3 MMOL/L (ref 0.5–2.2)
LACTIC ACID: 2.6 MMOL/L (ref 0.5–2.2)
LEUKOCYTE ESTERASE, URINE: ABNORMAL
LYMPHOCYTES ABSOLUTE: 0.63 E9/L (ref 1.5–4)
LYMPHOCYTES RELATIVE PERCENT: 3.7 % (ref 20–42)
MAGNESIUM: 1.2 MG/DL (ref 1.6–2.6)
MAGNESIUM: 2.3 MG/DL (ref 1.6–2.6)
MCH RBC QN AUTO: 30.3 PG (ref 26–35)
MCHC RBC AUTO-ENTMCNC: 33.5 % (ref 32–34.5)
MCV RBC AUTO: 90.6 FL (ref 80–99.9)
METER GLUCOSE: 123 MG/DL (ref 74–99)
METER GLUCOSE: 133 MG/DL (ref 74–99)
METER GLUCOSE: 224 MG/DL (ref 74–99)
METER GLUCOSE: 270 MG/DL (ref 74–99)
MONOCYTES ABSOLUTE: 1.02 E9/L (ref 0.1–0.95)
MONOCYTES RELATIVE PERCENT: 5.9 % (ref 2–12)
NEUTROPHILS ABSOLUTE: 15.35 E9/L (ref 1.8–7.3)
NEUTROPHILS RELATIVE PERCENT: 89.4 % (ref 43–80)
NITRITE, URINE: NEGATIVE
PDW BLD-RTO: 15.4 FL (ref 11.5–15)
PH UA: 7 (ref 5–9)
PHOSPHORUS: 1.8 MG/DL (ref 2.5–4.5)
PHOSPHORUS: 1.8 MG/DL (ref 2.5–4.5)
PLATELET # BLD: 131 E9/L (ref 130–450)
PMV BLD AUTO: 9 FL (ref 7–12)
POTASSIUM SERPL-SCNC: 3.4 MMOL/L (ref 3.5–5)
POTASSIUM SERPL-SCNC: 3.5 MMOL/L (ref 3.5–5)
POTASSIUM SERPL-SCNC: 3.6 MMOL/L (ref 3.5–5)
PROTEIN UA: NEGATIVE MG/DL
RBC # BLD: 2.44 E12/L (ref 3.5–5.5)
RBC UA: ABNORMAL /HPF (ref 0–2)
SODIUM BLD-SCNC: 139 MMOL/L (ref 132–146)
SODIUM BLD-SCNC: 142 MMOL/L (ref 132–146)
SODIUM BLD-SCNC: 143 MMOL/L (ref 132–146)
SPECIFIC GRAVITY UA: 1.01 (ref 1–1.03)
UROBILINOGEN, URINE: 0.2 E.U./DL
WBC # BLD: 17.2 E9/L (ref 4.5–11.5)
WBC UA: ABNORMAL /HPF (ref 0–5)
YEAST: PRESENT /HPF

## 2020-08-31 PROCEDURE — 85014 HEMATOCRIT: CPT

## 2020-08-31 PROCEDURE — 85018 HEMOGLOBIN: CPT

## 2020-08-31 PROCEDURE — 6360000002 HC RX W HCPCS: Performed by: NURSE PRACTITIONER

## 2020-08-31 PROCEDURE — C9113 INJ PANTOPRAZOLE SODIUM, VIA: HCPCS | Performed by: HOSPITALIST

## 2020-08-31 PROCEDURE — 81001 URINALYSIS AUTO W/SCOPE: CPT

## 2020-08-31 PROCEDURE — 2580000003 HC RX 258: Performed by: HOSPITALIST

## 2020-08-31 PROCEDURE — 2500000003 HC RX 250 WO HCPCS: Performed by: INTERNAL MEDICINE

## 2020-08-31 PROCEDURE — 2580000003 HC RX 258: Performed by: NURSE PRACTITIONER

## 2020-08-31 PROCEDURE — 80048 BASIC METABOLIC PNL TOTAL CA: CPT

## 2020-08-31 PROCEDURE — 85025 COMPLETE CBC W/AUTO DIFF WBC: CPT

## 2020-08-31 PROCEDURE — 82962 GLUCOSE BLOOD TEST: CPT

## 2020-08-31 PROCEDURE — 87088 URINE BACTERIA CULTURE: CPT

## 2020-08-31 PROCEDURE — 2700000000 HC OXYGEN THERAPY PER DAY

## 2020-08-31 PROCEDURE — 6370000000 HC RX 637 (ALT 250 FOR IP): Performed by: NURSE PRACTITIONER

## 2020-08-31 PROCEDURE — 92610 EVALUATE SWALLOWING FUNCTION: CPT | Performed by: SPEECH-LANGUAGE PATHOLOGIST

## 2020-08-31 PROCEDURE — 84100 ASSAY OF PHOSPHORUS: CPT

## 2020-08-31 PROCEDURE — 6360000002 HC RX W HCPCS: Performed by: INTERNAL MEDICINE

## 2020-08-31 PROCEDURE — 2580000003 HC RX 258: Performed by: INTERNAL MEDICINE

## 2020-08-31 PROCEDURE — 2060000000 HC ICU INTERMEDIATE R&B

## 2020-08-31 PROCEDURE — 6360000002 HC RX W HCPCS: Performed by: HOSPITALIST

## 2020-08-31 PROCEDURE — 2500000003 HC RX 250 WO HCPCS: Performed by: HOSPITALIST

## 2020-08-31 PROCEDURE — 83735 ASSAY OF MAGNESIUM: CPT

## 2020-08-31 PROCEDURE — 94669 MECHANICAL CHEST WALL OSCILL: CPT

## 2020-08-31 PROCEDURE — 1200000000 HC SEMI PRIVATE

## 2020-08-31 PROCEDURE — 2500000003 HC RX 250 WO HCPCS: Performed by: NURSE PRACTITIONER

## 2020-08-31 PROCEDURE — 36592 COLLECT BLOOD FROM PICC: CPT

## 2020-08-31 PROCEDURE — 83605 ASSAY OF LACTIC ACID: CPT

## 2020-08-31 RX ORDER — POTASSIUM CHLORIDE AND SODIUM CHLORIDE 900; 300 MG/100ML; MG/100ML
INJECTION, SOLUTION INTRAVENOUS CONTINUOUS
Status: DISCONTINUED | OUTPATIENT
Start: 2020-08-31 | End: 2020-09-01

## 2020-08-31 RX ADMIN — FENTANYL CITRATE 25 MCG: 50 INJECTION, SOLUTION INTRAMUSCULAR; INTRAVENOUS at 03:57

## 2020-08-31 RX ADMIN — METRONIDAZOLE 500 MG: 500 INJECTION, SOLUTION INTRAVENOUS at 12:13

## 2020-08-31 RX ADMIN — MAGNESIUM SULFATE HEPTAHYDRATE 1 G: 1 INJECTION, SOLUTION INTRAVENOUS at 08:08

## 2020-08-31 RX ADMIN — SODIUM CHLORIDE 8 MG/HR: 9 INJECTION, SOLUTION INTRAVENOUS at 16:52

## 2020-08-31 RX ADMIN — WATER 1 G: 1 INJECTION INTRAMUSCULAR; INTRAVENOUS; SUBCUTANEOUS at 04:54

## 2020-08-31 RX ADMIN — MAGNESIUM SULFATE HEPTAHYDRATE 1 G: 1 INJECTION, SOLUTION INTRAVENOUS at 11:01

## 2020-08-31 RX ADMIN — Medication 300 UNITS: at 21:41

## 2020-08-31 RX ADMIN — FENTANYL CITRATE 25 MCG: 50 INJECTION, SOLUTION INTRAMUSCULAR; INTRAVENOUS at 20:56

## 2020-08-31 RX ADMIN — METRONIDAZOLE 500 MG: 500 INJECTION, SOLUTION INTRAVENOUS at 19:50

## 2020-08-31 RX ADMIN — POTASSIUM PHOSPHATE, MONOBASIC AND POTASSIUM PHOSPHATE, DIBASIC 20 MMOL: 224; 236 INJECTION, SOLUTION, CONCENTRATE INTRAVENOUS at 21:16

## 2020-08-31 RX ADMIN — MAGNESIUM SULFATE HEPTAHYDRATE 1 G: 1 INJECTION, SOLUTION INTRAVENOUS at 09:31

## 2020-08-31 RX ADMIN — POTASSIUM CHLORIDE AND SODIUM CHLORIDE: 900; 300 INJECTION, SOLUTION INTRAVENOUS at 01:32

## 2020-08-31 RX ADMIN — SODIUM CHLORIDE 8 MG/HR: 9 INJECTION, SOLUTION INTRAVENOUS at 02:41

## 2020-08-31 RX ADMIN — MAGNESIUM SULFATE HEPTAHYDRATE 1 G: 1 INJECTION, SOLUTION INTRAVENOUS at 06:47

## 2020-08-31 RX ADMIN — METRONIDAZOLE 500 MG: 500 INJECTION, SOLUTION INTRAVENOUS at 03:57

## 2020-08-31 RX ADMIN — SODIUM PHOSPHATE, MONOBASIC, MONOHYDRATE 10.89 MMOL: 276; 142 INJECTION, SOLUTION INTRAVENOUS at 08:08

## 2020-08-31 RX ADMIN — INSULIN GLARGINE 20 UNITS: 100 INJECTION, SOLUTION SUBCUTANEOUS at 21:41

## 2020-08-31 RX ADMIN — INSULIN LISPRO 4 UNITS: 100 INJECTION, SOLUTION INTRAVENOUS; SUBCUTANEOUS at 08:48

## 2020-08-31 RX ADMIN — INSULIN LISPRO 6 UNITS: 100 INJECTION, SOLUTION INTRAVENOUS; SUBCUTANEOUS at 12:23

## 2020-08-31 ASSESSMENT — PAIN SCALES - WONG BAKER
WONGBAKER_NUMERICALRESPONSE: 0

## 2020-08-31 ASSESSMENT — PAIN DESCRIPTION - DESCRIPTORS: DESCRIPTORS: ACHING;CONSTANT;DISCOMFORT

## 2020-08-31 ASSESSMENT — PAIN SCALES - GENERAL
PAINLEVEL_OUTOF10: 0
PAINLEVEL_OUTOF10: 5
PAINLEVEL_OUTOF10: 10
PAINLEVEL_OUTOF10: 0

## 2020-08-31 ASSESSMENT — PAIN DESCRIPTION - LOCATION: LOCATION: GENERALIZED

## 2020-08-31 ASSESSMENT — PAIN DESCRIPTION - PAIN TYPE: TYPE: CHRONIC PAIN

## 2020-08-31 NOTE — PROGRESS NOTES
PROGRESS NOTE    By Kiki Gagnon D.O GI Fellow    [facility]  Dr. Gary Lopez MD, Dr. Janeth Caballero MD, Dr Griffin Holt, Dr. David Bee MD, Dr. Ben Quiroz, DO Dietrich Wynn  81 y.o.  female    SUBJECTIVE: Patient resting comofortably this am     OBJECTIVE:    BP (!) 106/49   Pulse 72   Temp 97.9 °F (36.6 °C) (Temporal)   Resp 20   Ht 5' 6\" (1.676 m)   Wt 149 lb 14.4 oz (68 kg)   SpO2 97%   BMI 24.19 kg/m²     Gen: NAD, AAO x 3  HEENT:PEERL, no icterus  Heart: RRR, no M/R/G  Lungs: CTAB  Abd.: soft, NT, ND, BS +, no G/R, no HSM  Extr.: no C/C/E, no bruising         Lab Results   Component Value Date    WBC 17.2 08/31/2020    WBC 22.9 08/30/2020    WBC 27.7 08/29/2020    HGB 7.4 08/31/2020    HGB 7.6 08/30/2020    HGB 8.0 08/30/2020    HCT 22.1 08/31/2020    MCV 90.6 08/31/2020    RDW 15.4 08/31/2020     08/31/2020     08/30/2020     08/29/2020     Lab Results   Component Value Date     08/31/2020    K 3.5 08/31/2020    K 5.0 08/27/2020     08/31/2020    CO2 27 08/31/2020    BUN 56 08/31/2020    CREATININE 1.4 08/31/2020    CALCIUM 7.8 08/31/2020    PROT 7.7 08/29/2020    LABALBU 3.4 08/29/2020    BILITOT 0.6 08/29/2020    BILITOT 0.5 05/09/2019    ALKPHOS 136 08/29/2020    ALKPHOS 136 05/09/2019    AST 34 08/29/2020    AST 20 05/09/2019    ALT 18 08/29/2020    ALT 20 05/09/2019     No results found for: LIPASE  No results found for: AMYLASE      ASSESSMENT/PLAN:    1 Melana/Acute Blood Loss Anemia   - VSS No further melena   - Hgb stable at 7.4 this am   - EGD 8/31/2020 with duodenal bulb ulcer treated with epi -see procedure note  - Continue with Protonix gtt for additional 24hrs then Protonix 40mg BID for 8 weeks   - Nkechi for diet from GI POV after seen by speech  - Nkechi to transfer out of ICU       Pt was discussed with Dr. Freeman Colindres and Dr. Juan Parker,   GI Fellow   8/31/2020  9:53 AM    Pt seen and independently examined. Pertinent notes and lab work reviewed. Monitor reviewed showing sinus rhythm. D/w Dr. Margoth Eric. Agree with physical exam and A&P. Discussed with patient/family at bedside - all questions answered - agreeable with the plan as delineated.     Nimco Duarte MD  8/31/2020  10:31 AM

## 2020-08-31 NOTE — PROGRESS NOTES
GENERAL SURGERY  DAILY PROGRESS NOTE  8/31/2020    Chief Complaint   Patient presents with    GI Bleeding     GI bleed       Subjective:  No acute events overnight.  No black or bloody BMs per RN    Objective:  BP (!) 119/57   Pulse 75   Temp 97.8 °F (36.6 °C) (Temporal)   Resp 18   Ht 5' 6\" (1.676 m)   Wt 149 lb 14.4 oz (68 kg)   SpO2 97%   BMI 24.19 kg/m²     GENERAL:  Laying in bed, awake, alert, cooperative, no apparent distress  LUNGS:  No increased work of breathing  CARDIOVASCULAR:  RR  ABDOMEN:  Soft, non-tender, non-distended  EXTREMITIES: No edema or swelling  SKIN: Warm and dry    Assessment/Plan:  81 y.o. female admitted with anemia, sp 8/30 EGD with LA grade D esophagitis, moderate-severe gastritis,, 2cm HH with bradly lesion, duodenitis with 1cm ulcer in duodenal sweep    Ok for diet  PPI per GI  No acute surgical intervention at this time  Surgery available if needed    Electronically signed by Yang Ann DO on 8/31/2020 at 1:58 PM

## 2020-08-31 NOTE — PROGRESS NOTES
Labs sent to Dr Kami Gamboa via secured messaging system, read at 46, see orders. 8/31/20 1:04 AM   [phone] 2 ICU From: Akhil Fu [facility] RE: Nathan Lyles RM: ICU 4 Results for Mary Kauffman (MRN [id number]) as of 8/31/2020 01:01 8/30/2020 23:41 Sodium: 139 Potassium: 3.4 (L) Chloride: 100 CO2: 25 BUN: 65 (H) Creatinine: 1.6 (H) Anion Gap: 14 GFR Non-: 31 GFR : 37 Lactic Acid: 2.6 (H) Glucose: 241 (H) Calcium: 7.9 (L) Hemoglobin Quant: 7.6 (L) Hematocrit: 21.9 (L) Urine output has been between /hour.   Read 1:20 AM

## 2020-08-31 NOTE — PROGRESS NOTES
myalgias, joint complaints or back pain. Integumentary:   Denies any rashes, ulcers, or excoriations. Denies bruising. Hematologic/Lymphatic:  Denies bruising or bleeding. Physical Exam:  No intake/output data recorded. Intake/Output Summary (Last 24 hours) at 8/31/2020 1149  Last data filed at 8/31/2020 0700  Gross per 24 hour   Intake 3345.67 ml   Output 2455 ml   Net 890.67 ml   I/O last 3 completed shifts: In: 3345.7 [P.O.:100; I.V.:928.7; IV Piggyback:2317]  Out: 6162 [Urine:2455]  Patient Vitals for the past 96 hrs (Last 3 readings):   Weight   08/29/20 1350 149 lb 14.4 oz (68 kg)   08/29/20 0308 150 lb (68 kg)     Vital Signs:   Blood pressure (!) 106/49, pulse 72, temperature 97.9 °F (36.6 °C), temperature source Temporal, resp. rate 20, height 5' 6\" (1.676 m), weight 149 lb 14.4 oz (68 kg), SpO2 97 %. General appearance: For more awake and alert today. She is oriented x3. Head:  Normocephalic. No masses, lesions or tenderness. Eyes:  PERRLA. EOMI. Sclera clear. Buccal mucosa moist.  ENT:  Neck braces in place. Neck:    Supple. Trachea midline. No thyromegaly. No JVD. No bruits. Heart:    Rhythm regular. Rate controlled. No murmurs. Lungs:    Symmetrical. Clear to auscultation bilaterally. No wheezes. No rhonchi. No rales. Abdomen:   Soft. Non-tender. Non-distended. Bowel sounds positive. No organomegaly or masses. No pain on palpation. Extremities:    Peripheral pulses present. No peripheral edema. No ulcers. No cyanosis. No clubbing. Neurologic:    Alert x 3. No focal deficit. Cranial nerves grossly intact. No focal weakness. Psych:   Behavior is normal. Mood appears normal. Speech is not rapid and/or pressured. Musculoskeletal:   Spine ROM normal. Muscular strength intact. Gait not assessed. Integumentary:  No rashes  Skin normal color and texture. Genitalia/Breast:  Voiding with use of a Gonzalez catheter.     Medication:  Scheduled Meds:   sodium chloride  20 mL Intravenous Once    cefTRIAXone (ROCEPHIN) IV  1 g Intravenous Q24H    metroNIDAZOLE  500 mg Intravenous Q8H    insulin lispro  0-12 Units Subcutaneous TID WC    insulin lispro  0-6 Units Subcutaneous Nightly    insulin glargine  20 Units Subcutaneous Nightly    heparin flush  3 mL Intravenous 2 times per day     Continuous Infusions:   0.9% NaCl with KCl 40 mEq 80 mL/hr at 08/31/20 0132    pantoprozole (PROTONIX) infusion 8 mg/hr (08/31/20 0241)    dextrose         Objective Data:  CBC with Differential:    Lab Results   Component Value Date    WBC 17.2 08/31/2020    RBC 2.44 08/31/2020    HGB 7.4 08/31/2020    HCT 22.1 08/31/2020     08/31/2020    MCV 90.6 08/31/2020    MCH 30.3 08/31/2020    MCHC 33.5 08/31/2020    RDW 15.4 08/31/2020    LYMPHOPCT 3.7 08/31/2020    MONOPCT 5.9 08/31/2020    BASOPCT 0.1 08/31/2020    MONOSABS 1.02 08/31/2020    LYMPHSABS 0.63 08/31/2020    EOSABS 0.00 08/31/2020    BASOSABS 0.02 08/31/2020     BMP:    Lab Results   Component Value Date     08/31/2020    K 3.5 08/31/2020    K 5.0 08/27/2020     08/31/2020    CO2 27 08/31/2020    BUN 56 08/31/2020    LABALBU 3.4 08/29/2020    CREATININE 1.4 08/31/2020    CALCIUM 7.8 08/31/2020    GFRAA 43 08/31/2020    LABGLOM 36 08/31/2020    GLUCOSE 216 08/31/2020       Assessment:  1. Acute upper GI bleed with hemorrhagic anemia resulting in hypovolemic shock with endoscopic findings of duodenitis with duodenal ulceration along with esophagitis and gastritis  2. Metabolic encephalopathy with improvement  3. Acute renal failure with associated hyperkalemia and metabolic acidosis  4. Recent cervical fracture requiring cervical bracing  with concomitant rib fracture  5. 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 Insulin-dependent diabetes mellitus type 2    Plan:   EGD results have been reviewed with the patient and her daughter who was present at the bedside.

## 2020-08-31 NOTE — DISCHARGE INSTR - COC
Therapy:  Current Nutrition Therapy:   - Oral Diet:  Carb Control 4 carbs/meal (1800kcals/day) and dysphagia pureed; mildly thick ( nectar)     Routes of Feeding: Oral  Liquids: Nectar Thick Liquids  Daily Fluid Restriction: no  Last Modified Barium Swallow with Video (Video Swallowing Test): not done    Treatments at the Time of Hospital Discharge:   Respiratory Treatments: none  Oxygen Therapy:  is on oxygen at 2 L/min per nasal cannula. Ventilator:    - No ventilator support    Rehab Therapies: speech  Weight Bearing Status/Restrictions: Other Medical Equipment (for information only, NOT a DME order)soft collar neck brace  Other Treatments:blood sugars achs    Patient's personal belongings (please select all that are sent with patient):  neck collar    RN SIGNATURE:  Electronically signed by Dary Farias RN on 9/3/20 at 11:42 AM EDT    CASE MANAGEMENT/SOCIAL WORK SECTION    Inpatient Status Date: 8/29/2020     Readmission Risk Assessment Score:  Readmission Risk              Risk of Unplanned Readmission:        28           Discharging to Facility/ Agency   · Name: [facility]  [place]) [address] [address]   Phone: [phone]      / signature: Electronically signed by Osmar Ayala RN-BC on 8/31/2020 at 10:49 AM      PHYSICIAN SECTION    Prognosis: {Prognosis:0000111413}    Condition at Discharge: [address] Patient Condition:[id number]}    Rehab Potential (if transferring to Rehab): {Prognosis:2632246066}    Recommended Labs or Other Treatments After Discharge: ***    Physician Certification: I certify the above information and transfer of Matthew Moore  is necessary for the continuing treatment of the diagnosis listed and that she requires {Admit to Appropriate Level of Care:55900} for {GREATER/LESS:174492311} 30 days.      Update Admission H&P: {CHP DME Changes in QCGHR:859674013}    PHYSICIAN SIGNATURE:  Electronically signed by Susy Martinez DO on 9/3/20 at

## 2020-08-31 NOTE — PROGRESS NOTES
Dr Stacey Rahman sent morning lab results via secure messaging system. 8/31/20 7:00 AM   Results for Watson Simmons (MRN [id number]) as of 8/31/2020 06:58 8/31/2020 05:34 Sodium: 143 Potassium: 3.5 Chloride: 104 CO2: 27 BUN: 56 (H) Creatinine: 1.4 (H) Anion Gap: 12 GFR Non-: 36 GFR : 43 Magnesium: 1.2 (L) Lactic Acid: 1.3 Glucose: 216 (H) Calcium: 7.8 (L) Phosphorus: 1.8 (L) Currently starting replacements on Magnesium, phos requested from pharmacy, currently NS with 40 KCL is infusing at 80.       Read 7:09 AM

## 2020-08-31 NOTE — PROGRESS NOTES
Assessment and Plan  Patient is a 81 y.o. female with the following medical Problems:   1. Upper GI bleeding secondary to duodenal ulcer and esophagitis  2. Hypovolemic shock (resolved)  3. Metabolic encephalopathy (improving)  4. Acute kidney injury (improving)  5. Recent cervical fracture  6. Infrarenal aneurysm  7. Type 2 diabetes    Plan of care:  1. Transfuse to keep hemoglobin above 7  2. Patient had an EGD which showed duodenal ulcer that was treated with epinephrine  3.  UA and urine culture  4. We will discontinue the Gonzalez catheter and the central line and transfer the patient to a telemetry floor  5. SCDs for DVT prophylaxis  6. Speech therapy evaluation and advance diet accordingly    History of Present Illness:   Patient is a 58-year-old woman who was admitted on August 29, 2020 with melena and 2 g drop in hemoglobin. She was admitted to the ICU and underwent an EGD yesterday which showed duodenal bulb ulcer that was treated with epinephrine. Patient has been hemodynamically stable since then. She continues to be on and off confused. Past Medical History:  Past Medical History:   Diagnosis Date    CAD (coronary artery disease)     COPD (chronic obstructive pulmonary disease) ([facility] [address]     early stage    Diabetes mellitus ([facility] [address]     Hyperlipidemia     Hypertension         Family History:   History reviewed. No pertinent family history. Allergies:         Naproxen; Nsaids; and Vicodin [hydrocodone-acetaminophen]    Social history:  Social History     Socioeconomic History    Marital status:       Spouse name: Not on file    Number of children: Not on file    Years of education: Not on file    Highest education level: Not on file   Occupational History    Not on file   Social Needs    Financial resource strain: Not on file    Food insecurity     Worry: Not on file     Inability: Not on file    Transportation needs     Medical: Not on file     Non-medical: Not on file Tobacco Use    Smoking status: Former Smoker     Years: 30.00     Last attempt to quit: 4/3/2013     Years since quittin.4    Smokeless tobacco: Never Used   Substance and Sexual Activity    Alcohol use: No    Drug use: Never    Sexual activity: Not Currently   Lifestyle    Physical activity     Days per week: Not on file     Minutes per session: Not on file    Stress: Not on file   Relationships    Social connections     Talks on phone: Not on file     Gets together: Not on file     Attends Jain service: Not on file     Active member of club or organization: Not on file     Attends meetings of clubs or organizations: Not on file     Relationship status: Not on file    Intimate partner violence     Fear of current or ex partner: Not on file     Emotionally abused: Not on file     Physically abused: Not on file     Forced sexual activity: Not on file   Other Topics Concern    Not on file   Social History Narrative    Not on file       Current Medications:     Current Facility-Administered Medications:     0.9% NaCl with KCl 40 mEq infusion, , Intravenous, Continuous, Marin Rich Claude, MD, Last Rate: 80 mL/hr at 20 0132    0.9 % sodium chloride bolus, 20 mL, Intravenous, Once, Poonam Root DO    acetaminophen (TYLENOL) tablet 650 mg, 650 mg, Oral, Q6H PRN, Gonzalez Severino DO, 650 mg at 20 1055    cefTRIAXone (ROCEPHIN) 1 g in sterile water 10 mL IV syringe, 1 g, Intravenous, Q24H, Tree Motley MD, 1 g at 20 0454    metronidazole (FLAGYL) 500 mg in NaCl 100 mL IVPB premix, 500 mg, Intravenous, Q8H, Tree Motley MD, Last Rate: 100 mL/hr at 20 1213, 500 mg at 20 1213    pantoprazole (PROTONIX) 80 mg in sodium chloride 0.9 % 100 mL infusion, 8 mg/hr, Intravenous, Continuous, Tree Motley MD, Last Rate: 10 mL/hr at 20 0241, 8 mg/hr at 20 0241    glucose (GLUTOSE) 40 % oral gel 15 g, 15 g, Oral, PRN, NILTON Tadeo - CNP    dextrose 50 % IV solution, 12.5 g, Intravenous, PRN, Clarise Quirk, APRN - CNP    glucagon (rDNA) injection 1 mg, 1 mg, Intramuscular, PRN, Clarise Quirk, APRN - CNP    dextrose 5 % solution, 100 mL/hr, Intravenous, PRN, Clarise Quirk, APRN - CNP    insulin lispro (HUMALOG) injection vial 0-12 Units, 0-12 Units, Subcutaneous, TID WC, Clarise Quirk, APRN - CNP, 6 Units at 08/31/20 1223    insulin lispro (HUMALOG) injection vial 0-6 Units, 0-6 Units, Subcutaneous, Nightly, Clarise Quirk, APRN - CNP, 3 Units at 08/30/20 2011    magnesium sulfate 1 g in dextrose 5% 100 mL IVPB, 1 g, Intravenous, PRN, Clarise Quirk, APRN - CNP, Stopped at 08/31/20 1209    sodium phosphate 10.89 mmol in dextrose 5 % 250 mL IVPB, 0.16 mmol/kg, Intravenous, PRN, Stopped at 08/31/20 1209 **OR** sodium phosphate 21.75 mmol in dextrose 5 % 500 mL IVPB, 0.32 mmol/kg, Intravenous, PRN, Clarise Quirk, APRN - CNP    potassium chloride 10 mEq/100 mL IVPB (Peripheral Line), 10 mEq, Intravenous, PRN, Clarise Quirk, APRN - CNP    insulin glargine (LANTUS) injection vial 20 Units, 20 Units, Subcutaneous, Nightly, Clarise Quirk, APRN - CNP, 20 Units at 08/30/20 2010    perflutren lipid microspheres (DEFINITY) injection 1.65 mg, 1.5 mL, Intravenous, ONCE PRN, Clarise Quirk, APRN - CNP    sodium chloride flush 0.9 % injection 10 mL, 10 mL, Intravenous, PRN, Alcario Marvin, DO    heparin flush 100 UNIT/ML injection 300 Units, 3 mL, Intravenous, 2 times per day, Alcario Chatham, DO, 300 Units at 08/30/20 2028    heparin flush 100 UNIT/ML injection 300 Units, 3 mL, Intracatheter, PRN, Alcario Marvin, DO    fentaNYL (SUBLIMAZE) injection 25 mcg, 25 mcg, Intravenous, Q3H PRN, Alcario Marvin, DO, 25 mcg at 08/31/20 9022    Review of Systems:   General: denies weight gain, denies loss of appetite, fever, chills, night sweats.   HEENT: denies headaches, dizziness, head trauma, visual changes, eye pain, tinnitus, nosebleeds, hoarseness or the patient's history and personally examined the patient to confirm the physical findings described above, and that I reviewed the relevant imaging studies and available reports. I also discussed the differential diagnosis and all of the proposed management plans with the patient and individuals accompanying the patient to this visit. They had the opportunity to ask questions about the proposed management plans and to have those questions answered. This patient has a high probability of sudden, clinically significant deterioration, which requires the highest level of physician preparedness to intervene urgently. I managed/supervised life or organ supporting interventions that required frequent physician assessment. I devoted my full attention to the direct care of this patient for the amount of time indicated below. Time I spent with family or surrogate(s) is included only if the patient was incapable of providing the necessary information or participating in medical decision-making. Time devoted to teaching and to any procedures I billed separately is not included.   Critical Care Time: 35 minutes      Electronically signed by Ibeth Wilson MD on 8/31/2020 at 12:56 PM

## 2020-08-31 NOTE — PROGRESS NOTES
aspiration is suspected, a Videofluoroscopic Study of Swallowing (MBS) is recommended and requires a physician order., Immediate wet cough was noted after presentation of thin liquid and Consistent O2  desaturation after the swallow for thin liquids                  The Speech Language Pathologist (SLP) completed education with the patient regarding results of evaluation. Explained that Speech Pathology intervention is warranted  at this time   Prognosis for improvements is fair +     This plan will be re-evaluated and revised in 1 week  if warranted. Patient stated goals: Agreed with above,   Treatment goals discussed with Patient   The Patient understand(s) the diagnosis, prognosis and plan of care       CPT code:  68858  bedside swallow eval      [x]The admitting diagnosis and active problem list, as listed below have been reviewed prior to initiation of this evaluation. ADMITTING DIAGNOSIS: GI bleed [K92.2]     ACTIVE PROBLEM LIST:   Patient Active Problem List   Diagnosis    Fracture of left humerus    Hypercholesterolemia    Systolic hypertension    History of coronary artery bypass graft    COPD (chronic obstructive pulmonary disease) ([address]    Non-insulin dependent type 2 diabetes mellitus ([address]    Fall from standing    Closed nondisplaced fracture of second cervical vertebra ([address]    right 3rd-5th rib and left 9th rib fractures.     Pulmonary nodule, right- Incidental     Liver cyst- Incidental     GI bleed

## 2020-08-31 NOTE — PLAN OF CARE
Problem: Falls - Risk of:  Goal: Will remain free from falls  Description: Will remain free from falls  8/31/2020 0223 by Sachin Ray RN  Outcome: Met This Shift  8/30/2020 1900 by Krista Maldonado RN  Outcome: Met This Shift  Goal: Absence of physical injury  Description: Absence of physical injury  8/31/2020 0223 by Sachin Ray RN  Outcome: Met This Shift  8/30/2020 1900 by Krista Maldonado RN  Outcome: Met This Shift     Problem: Skin Integrity:  Goal: Will show no infection signs and symptoms  Description: Will show no infection signs and symptoms  8/31/2020 0223 by Sachin Ray RN  Outcome: Met This Shift  8/30/2020 1900 by Krista Maldonado RN  Outcome: Met This Shift  Goal: Absence of new skin breakdown  Description: Absence of new skin breakdown  8/31/2020 0223 by Sachin Ray RN  Outcome: Met This Shift  8/30/2020 1900 by Krista Maldonado RN  Outcome: Met This Shift

## 2020-08-31 NOTE — PROGRESS NOTES
Nephrology Note  Ana Garcia MD          Patient seen and examined. Chart reviewed. Patient's daughter is present at bedside. Oral intake and appetite is still poor. As per the daughter the patient has had episodes of confusion. The patient is denying any shortness of breath. No nausea vomiting or dysgeusia. Results of EGD noted. Patient is awake and alert. In no acute distress. Vital SignsBP (!) 119/57   Pulse 75   Temp 97.8 °F (36.6 °C) (Temporal)   Resp 18   Ht 5' 6\" (1.676 m)   Wt 149 lb 14.4 oz (68 kg)   SpO2 97%   BMI 24.19 kg/m²   24HR INTAKE/OUTPUT:    Intake/Output Summary (Last 24 hours) at 8/31/2020 1251  Last data filed at 8/31/2020 0700  Gross per 24 hour   Intake 3045.67 ml   Output 2455 ml   Net 590.67 ml         Physical Exam    HEENT: Dry oral mucosa  Neck: No JVD  Lungs: Breath sounds decreased at the bases. No rales or ronchi. Heart: Regular rate and rhythm. No S3 gallop. Grade 2/6 systolic murmur along the left sternal border  Abdomen: Soft non distended, non tender and normal bowel sounds.   Extremeties: No edema.        ROS:  RESPIRATORY:  negative  CARDIOVASCULAR:  negative  GASTROINTESTINAL:  positive  For poor appetite       Current Facility-Administered Medications   Medication Dose Route Frequency Provider Last Rate Last Dose    0.9% NaCl with KCl 40 mEq infusion   Intravenous Continuous Marin Remi Hernandez MD 80 mL/hr at 08/31/20 0132      0.9 % sodium chloride bolus  20 mL Intravenous Once Kwesi Kingi, DO        acetaminophen (TYLENOL) tablet 650 mg  650 mg Oral Q6H PRN Pamaaron Allen Bisruthy, DO   650 mg at 08/29/20 1055    cefTRIAXone (ROCEPHIN) 1 g in sterile water 10 mL IV syringe  1 g Intravenous Q24H Mercy Zamora MD   1 g at 08/31/20 0454    metronidazole (FLAGYL) 500 mg in NaCl 100 mL IVPB premix  500 mg Intravenous Q8H Mercy Zamora  mL/hr at 08/31/20 1213 500 mg at 08/31/20 1213    pantoprazole (PROTONIX) 80 mg in sodium chloride 0.9 % 100 mL infusion  8 mg/hr Intravenous Continuous Sumanth Rodrigez MD 10 mL/hr at 08/31/20 0241 8 mg/hr at 08/31/20 0241    glucose (GLUTOSE) 40 % oral gel 15 g  15 g Oral PRN John Cabral, APRN - CNP        dextrose 50 % IV solution  12.5 g Intravenous PRN John Cabral, APRN - CNP        glucagon (rDNA) injection 1 mg  1 mg Intramuscular PRN John Cabral, APRN - CNP        dextrose 5 % solution  100 mL/hr Intravenous PRN John Cabral, APRN - CNP        insulin lispro (HUMALOG) injection vial 0-12 Units  0-12 Units Subcutaneous TID WC John Cabral, APRN - CNP   6 Units at 08/31/20 1223    insulin lispro (HUMALOG) injection vial 0-6 Units  0-6 Units Subcutaneous Nightly John Cabral, APRN - CNP   3 Units at 08/30/20 2011    magnesium sulfate 1 g in dextrose 5% 100 mL IVPB  1 g Intravenous PRN John Cabral, APRN - CNP   Stopped at 08/31/20 1209    sodium phosphate 10.89 mmol in dextrose 5 % 250 mL IVPB  0.16 mmol/kg Intravenous PRN John Cabral, APRN - CNP   Stopped at 08/31/20 1209    Or    sodium phosphate 21.75 mmol in dextrose 5 % 500 mL IVPB  0.32 mmol/kg Intravenous PRN John Cabral, APRN - CNP        potassium chloride 10 mEq/100 mL IVPB (Peripheral Line)  10 mEq Intravenous PRN John Cabral, APRN - CNP        insulin glargine (LANTUS) injection vial 20 Units  20 Units Subcutaneous Nightly John Cabral, APRN - CNP   20 Units at 08/30/20 2010    perflutren lipid microspheres (DEFINITY) injection 1.65 mg  1.5 mL Intravenous ONCE PRN John Cbaral, APRN - CNP        sodium chloride flush 0.9 % injection 10 mL  10 mL Intravenous PRN Saralyn Pea, DO        heparin flush 100 UNIT/ML injection 300 Units  3 mL Intravenous 2 times per day Saralyn Pea, DO   300 Units at 08/30/20 2028    heparin flush 100 UNIT/ML injection 300 Units  3 mL Intracatheter PRN Saralyn Pea, DO        fentaNYL (SUBLIMAZE) injection 25 mcg  25 mcg Intravenous Q3H PRN Saralyn Pea, DO   25 mcg at 08/31/20 0357       XR CHEST PORTABLE   Final Result   Central line with tip at the cavoatrial junction. Small right pleural effusion. No pneumothorax. XR CHEST PORTABLE   Final Result   An acute process is not appreciated. Rotated exam.         CT ABDOMEN PELVIS WO CONTRAST Additional Contrast? None   Final Result   No acute abdominopelvic findings. No substantial change in size or appearance of complex cyst at the right   hepatic dome. This could be proteinaceous or hemorrhagic although   multiphasic liver protocol MRI could further characterize if appropriate   based on patient's life expectancy. Colonic diverticulosis without evidence of diverticulitis. 3.5 cm fusiform infrarenal abdominal aortic aneurysm. See recommendations   for follow-up. RECOMMENDATIONS:   For management of fusiform AAA:      3.5-3.9 cm AAA, recommend follow-up every 2 years. References: Dion Horde Radiol 2013; 13(48):825-769; J Vasc Surg. 2018; 67:2-77               Labs:    CBC:   Recent Labs     08/29/20  1410  08/30/20  0529  08/30/20  2341 08/31/20  0534 08/31/20  1147   WBC 27.7*  --  22.9*  --   --  17.2*  --    HGB 7.0*   < > 8.4*   < > 7.6* 7.4* 7.2*     --  159  --   --  131  --     < > = values in this interval not displayed. Lab Results   Component Value Date    IRON 73 08/29/2020    TIBC 243 (L) 08/29/2020    FERRITIN 183 08/29/2020       Lab Results   Component Value Date    CALCIUM 7.8 (L) 08/31/2020    CALCIUM 7.9 (L) 08/30/2020    CALCIUM 7.9 (L) 08/30/2020    PHOS 1.8 (L) 08/31/2020    PHOS 3.2 08/30/2020    MG 1.2 (L) 08/31/2020    MG 1.3 (L) 08/30/2020       BMP:   Recent Labs     08/30/20  1743 08/30/20  2341 08/31/20  0534    139 143   K 3.4* 3.4* 3.5    100 104   CO2 25 25 27   BUN 73* 65* 56*   CREATININE 1.7* 1.6* 1.4*   GLUCOSE 239* 241* 216*             Assessment: / Plan:    1. Acute kidney injury stage III.   Acute kidney injury secondary renal hypoperfusion with volume depletion and concurrent use of diuretics . Urine studies also suggest renal hypoperfusion. Renal function has improved.     2.  Metabolic acidosis with lactic acidosis. Improved. Lactic acidosis due to acute kidney injury in the setting of use of metformin.     3. Hypokalemia. improved with supplement. Magnesium remains low. Supplement potassium and Magnesium.        4. Hypomagnesemia. Supplement.     5. Anemia. Iron studies adequate. Anemia of chronic kidney disease          Discussed with  daughter.  Rodolfo Donato MD  Nephrology  Electronically signed by Vallorie Krabbe, MD on 8/31/2020 at 12:36 PM      Note: This report was completed utilizing computer voice recognition software. Every effort has been made to ensure accuracy, however; inadvertent computerized transcription errors may be present.

## 2020-09-01 LAB
ANION GAP SERPL CALCULATED.3IONS-SCNC: 11 MMOL/L (ref 7–16)
BASOPHILS ABSOLUTE: 0.01 E9/L (ref 0–0.2)
BASOPHILS RELATIVE PERCENT: 0.1 % (ref 0–2)
BUN BLDV-MCNC: 33 MG/DL (ref 8–23)
CALCIUM SERPL-MCNC: 8.1 MG/DL (ref 8.6–10.2)
CHLORIDE BLD-SCNC: 106 MMOL/L (ref 98–107)
CO2: 25 MMOL/L (ref 22–29)
CREAT SERPL-MCNC: 1.1 MG/DL (ref 0.5–1)
EOSINOPHILS ABSOLUTE: 0 E9/L (ref 0.05–0.5)
EOSINOPHILS RELATIVE PERCENT: 0 % (ref 0–6)
GFR AFRICAN AMERICAN: 57
GFR NON-AFRICAN AMERICAN: 47 ML/MIN/1.73
GLUCOSE BLD-MCNC: 136 MG/DL (ref 74–99)
HCT VFR BLD CALC: 23.2 % (ref 34–48)
HEMOGLOBIN: 7.5 G/DL (ref 11.5–15.5)
IMMATURE GRANULOCYTES #: 0.15 E9/L
IMMATURE GRANULOCYTES %: 0.9 % (ref 0–5)
LYMPHOCYTES ABSOLUTE: 1.05 E9/L (ref 1.5–4)
LYMPHOCYTES RELATIVE PERCENT: 6 % (ref 20–42)
MAGNESIUM: 1.7 MG/DL (ref 1.6–2.6)
MCH RBC QN AUTO: 30.6 PG (ref 26–35)
MCHC RBC AUTO-ENTMCNC: 32.3 % (ref 32–34.5)
MCV RBC AUTO: 94.7 FL (ref 80–99.9)
METER GLUCOSE: 113 MG/DL (ref 74–99)
METER GLUCOSE: 114 MG/DL (ref 74–99)
METER GLUCOSE: 227 MG/DL (ref 74–99)
MONOCYTES ABSOLUTE: 1.49 E9/L (ref 0.1–0.95)
MONOCYTES RELATIVE PERCENT: 8.5 % (ref 2–12)
NEUTROPHILS ABSOLUTE: 14.91 E9/L (ref 1.8–7.3)
NEUTROPHILS RELATIVE PERCENT: 84.5 % (ref 43–80)
PDW BLD-RTO: 15.9 FL (ref 11.5–15)
PHOSPHORUS: 2.6 MG/DL (ref 2.5–4.5)
PLATELET # BLD: 142 E9/L (ref 130–450)
PMV BLD AUTO: 9 FL (ref 7–12)
POTASSIUM SERPL-SCNC: 4 MMOL/L (ref 3.5–5)
RBC # BLD: 2.45 E12/L (ref 3.5–5.5)
SODIUM BLD-SCNC: 142 MMOL/L (ref 132–146)
WBC # BLD: 17.6 E9/L (ref 4.5–11.5)

## 2020-09-01 PROCEDURE — 2060000000 HC ICU INTERMEDIATE R&B

## 2020-09-01 PROCEDURE — 6360000002 HC RX W HCPCS: Performed by: HOSPITALIST

## 2020-09-01 PROCEDURE — 2580000003 HC RX 258: Performed by: INTERNAL MEDICINE

## 2020-09-01 PROCEDURE — 6370000000 HC RX 637 (ALT 250 FOR IP): Performed by: NURSE PRACTITIONER

## 2020-09-01 PROCEDURE — 2500000003 HC RX 250 WO HCPCS: Performed by: HOSPITALIST

## 2020-09-01 PROCEDURE — 83735 ASSAY OF MAGNESIUM: CPT

## 2020-09-01 PROCEDURE — 82962 GLUCOSE BLOOD TEST: CPT

## 2020-09-01 PROCEDURE — 99223 1ST HOSP IP/OBS HIGH 75: CPT | Performed by: INTERNAL MEDICINE

## 2020-09-01 PROCEDURE — 85025 COMPLETE CBC W/AUTO DIFF WBC: CPT

## 2020-09-01 PROCEDURE — 6360000002 HC RX W HCPCS: Performed by: INTERNAL MEDICINE

## 2020-09-01 PROCEDURE — 6370000000 HC RX 637 (ALT 250 FOR IP): Performed by: INTERNAL MEDICINE

## 2020-09-01 PROCEDURE — 92526 ORAL FUNCTION THERAPY: CPT

## 2020-09-01 PROCEDURE — 2700000000 HC OXYGEN THERAPY PER DAY

## 2020-09-01 PROCEDURE — 2500000003 HC RX 250 WO HCPCS: Performed by: INTERNAL MEDICINE

## 2020-09-01 PROCEDURE — 84100 ASSAY OF PHOSPHORUS: CPT

## 2020-09-01 PROCEDURE — 2580000003 HC RX 258: Performed by: HOSPITALIST

## 2020-09-01 PROCEDURE — 36592 COLLECT BLOOD FROM PICC: CPT

## 2020-09-01 PROCEDURE — APPSS60 APP SPLIT SHARED TIME 46-60 MINUTES: Performed by: NURSE PRACTITIONER

## 2020-09-01 PROCEDURE — 93005 ELECTROCARDIOGRAM TRACING: CPT | Performed by: INTERNAL MEDICINE

## 2020-09-01 PROCEDURE — 80048 BASIC METABOLIC PNL TOTAL CA: CPT

## 2020-09-01 RX ORDER — PANTOPRAZOLE SODIUM 40 MG/1
40 TABLET, DELAYED RELEASE ORAL
Status: DISCONTINUED | OUTPATIENT
Start: 2020-09-02 | End: 2020-09-02

## 2020-09-01 RX ORDER — QUETIAPINE FUMARATE 25 MG/1
12.5 TABLET, FILM COATED ORAL 2 TIMES DAILY
Status: DISCONTINUED | OUTPATIENT
Start: 2020-09-01 | End: 2020-09-03 | Stop reason: HOSPADM

## 2020-09-01 RX ORDER — FUROSEMIDE 10 MG/ML
20 INJECTION INTRAMUSCULAR; INTRAVENOUS ONCE
Status: COMPLETED | OUTPATIENT
Start: 2020-09-01 | End: 2020-09-01

## 2020-09-01 RX ORDER — DIGOXIN 0.25 MG/ML
250 INJECTION INTRAMUSCULAR; INTRAVENOUS EVERY 4 HOURS
Status: COMPLETED | OUTPATIENT
Start: 2020-09-01 | End: 2020-09-02

## 2020-09-01 RX ORDER — SODIUM CHLORIDE, SODIUM LACTATE, POTASSIUM CHLORIDE, CALCIUM CHLORIDE 600; 310; 30; 20 MG/100ML; MG/100ML; MG/100ML; MG/100ML
INJECTION, SOLUTION INTRAVENOUS CONTINUOUS
Status: DISCONTINUED | OUTPATIENT
Start: 2020-09-01 | End: 2020-09-03 | Stop reason: HOSPADM

## 2020-09-01 RX ADMIN — DIGOXIN 250 MCG: 250 INJECTION, SOLUTION INTRAMUSCULAR; INTRAVENOUS; PARENTERAL at 20:15

## 2020-09-01 RX ADMIN — SODIUM CHLORIDE, PRESERVATIVE FREE 10 ML: 5 INJECTION INTRAVENOUS at 20:06

## 2020-09-01 RX ADMIN — Medication 300 UNITS: at 20:06

## 2020-09-01 RX ADMIN — FENTANYL CITRATE 25 MCG: 50 INJECTION, SOLUTION INTRAMUSCULAR; INTRAVENOUS at 07:45

## 2020-09-01 RX ADMIN — INSULIN GLARGINE 20 UNITS: 100 INJECTION, SOLUTION SUBCUTANEOUS at 20:08

## 2020-09-01 RX ADMIN — METRONIDAZOLE 500 MG: 500 INJECTION, SOLUTION INTRAVENOUS at 03:36

## 2020-09-01 RX ADMIN — FENTANYL CITRATE 25 MCG: 50 INJECTION, SOLUTION INTRAMUSCULAR; INTRAVENOUS at 00:55

## 2020-09-01 RX ADMIN — FENTANYL CITRATE 25 MCG: 50 INJECTION, SOLUTION INTRAMUSCULAR; INTRAVENOUS at 20:05

## 2020-09-01 RX ADMIN — WATER 1 G: 1 INJECTION INTRAMUSCULAR; INTRAVENOUS; SUBCUTANEOUS at 04:46

## 2020-09-01 RX ADMIN — DIGOXIN 250 MCG: 250 INJECTION, SOLUTION INTRAMUSCULAR; INTRAVENOUS; PARENTERAL at 16:59

## 2020-09-01 RX ADMIN — FUROSEMIDE 20 MG: 10 INJECTION, SOLUTION INTRAVENOUS at 22:43

## 2020-09-01 RX ADMIN — SODIUM CHLORIDE, POTASSIUM CHLORIDE, SODIUM LACTATE AND CALCIUM CHLORIDE: 600; 310; 30; 20 INJECTION, SOLUTION INTRAVENOUS at 22:43

## 2020-09-01 RX ADMIN — METOPROLOL TARTRATE 25 MG: 25 TABLET, FILM COATED ORAL at 10:21

## 2020-09-01 RX ADMIN — QUETIAPINE FUMARATE 12.5 MG: 25 TABLET ORAL at 10:21

## 2020-09-01 RX ADMIN — ENOXAPARIN SODIUM 40 MG: 40 INJECTION SUBCUTANEOUS at 10:21

## 2020-09-01 RX ADMIN — METOPROLOL TARTRATE 25 MG: 25 TABLET, FILM COATED ORAL at 20:05

## 2020-09-01 RX ADMIN — INSULIN LISPRO 2 UNITS: 100 INJECTION, SOLUTION INTRAVENOUS; SUBCUTANEOUS at 20:08

## 2020-09-01 RX ADMIN — QUETIAPINE FUMARATE 12.5 MG: 25 TABLET ORAL at 20:05

## 2020-09-01 RX ADMIN — DILTIAZEM HYDROCHLORIDE 5 MG/HR: 5 INJECTION INTRAVENOUS at 17:04

## 2020-09-01 RX ADMIN — SODIUM CHLORIDE, POTASSIUM CHLORIDE, SODIUM LACTATE AND CALCIUM CHLORIDE: 600; 310; 30; 20 INJECTION, SOLUTION INTRAVENOUS at 10:21

## 2020-09-01 ASSESSMENT — PAIN SCALES - GENERAL
PAINLEVEL_OUTOF10: 0
PAINLEVEL_OUTOF10: 3
PAINLEVEL_OUTOF10: 10
PAINLEVEL_OUTOF10: 0
PAINLEVEL_OUTOF10: 10
PAINLEVEL_OUTOF10: 0

## 2020-09-01 ASSESSMENT — PAIN DESCRIPTION - ONSET: ONSET: ON-GOING

## 2020-09-01 ASSESSMENT — PAIN DESCRIPTION - DESCRIPTORS: DESCRIPTORS: ACHING;DISCOMFORT;SORE

## 2020-09-01 ASSESSMENT — PAIN DESCRIPTION - PAIN TYPE: TYPE: ACUTE PAIN

## 2020-09-01 ASSESSMENT — PAIN DESCRIPTION - FREQUENCY: FREQUENCY: CONTINUOUS

## 2020-09-01 ASSESSMENT — PAIN DESCRIPTION - LOCATION: LOCATION: RIB CAGE

## 2020-09-01 ASSESSMENT — PAIN DESCRIPTION - ORIENTATION: ORIENTATION: RIGHT

## 2020-09-01 NOTE — PROGRESS NOTES
Nephrology Note  Kyung Perry MD          Patient seen and examined. No family member is present at bedside. Chart reviewed    Patient is refusing most of her oral medications. Denies shortness of breath. Appetite remains poor. No nausea or dysguesia. Awake and alert . In no acute distress. Vital SignsBP 116/73   Pulse 125   Temp 98.4 °F (36.9 °C) (Axillary)   Resp 18   Ht 5' 6\" (1.676 m)   Wt 149 lb 14.4 oz (68 kg)   SpO2 94%   BMI 24.19 kg/m²   24HR INTAKE/OUTPUT:    Intake/Output Summary (Last 24 hours) at 9/1/2020 1240  Last data filed at 9/1/2020 1501  Gross per 24 hour   Intake 1610.17 ml   Output 650 ml   Net 960.17 ml         Physical Exam    HEENT: Dry oral mucosa  Neck: No JVD  Lungs: Breath sounds decreased at the bases. No rales or ronchi. Heart: Regular rate and rhythm. No S3 gallop.  Grade 2/6 systolic murmur along the left sternal border  Abdomen: Soft non distended, non tender and normal bowel sounds.   Extremeties: No edema.        ROS:  RESPIRATORY:  negative  CARDIOVASCULAR:  negative  GASTROINTESTINAL:  positive  For poor appetite       Current Facility-Administered Medications   Medication Dose Route Frequency Provider Last Rate Last Dose    [START ON 9/2/2020] pantoprazole (PROTONIX) tablet 40 mg  40 mg Oral QAM AC Emmanuel Salas DO        lactated ringers infusion   Intravenous Continuous Melissa Cleveland MD 75 mL/hr at 09/01/20 1021      metoprolol tartrate (LOPRESSOR) tablet 25 mg  25 mg Oral BID Melissa Cleveland MD   25 mg at 09/01/20 1021    QUEtiapine (SEROQUEL) tablet 12.5 mg  12.5 mg Oral BID Melissa Cleveland MD   12.5 mg at 09/01/20 1021    enoxaparin (LOVENOX) injection 40 mg  40 mg Subcutaneous Daily Maeve Escobar MD   40 mg at 09/01/20 1021    0.9 % sodium chloride bolus  20 mL Intravenous Once Gilman DO Rohan        acetaminophen (TYLENOL) tablet 650 mg  650 mg Oral Q6H PRN Gonzalez Severino DO   650 mg at 08/29/20 1055    glucose (GLUTOSE) 40 % oral gel 15 g  15 g Oral PRN Arletta Emma, APRN - CNP        dextrose 50 % IV solution  12.5 g Intravenous PRN Arletta Emma, APRN - CNP        glucagon (rDNA) injection 1 mg  1 mg Intramuscular PRN Arletta Emma, APRN - CNP        dextrose 5 % solution  100 mL/hr Intravenous PRN Arletta Emma, APRN - CNP        insulin lispro (HUMALOG) injection vial 0-12 Units  0-12 Units Subcutaneous TID WC Arletta Emma, APRN - CNP   6 Units at 08/31/20 1223    insulin lispro (HUMALOG) injection vial 0-6 Units  0-6 Units Subcutaneous Nightly Arletta Emma, APRN - CNP   3 Units at 08/30/20 2011    magnesium sulfate 1 g in dextrose 5% 100 mL IVPB  1 g Intravenous PRN Arletta Emma, APRN - CNP   Stopped at 08/31/20 1209    sodium phosphate 10.89 mmol in dextrose 5 % 250 mL IVPB  0.16 mmol/kg Intravenous PRN Arletta Emma, APRN - CNP   Stopped at 08/31/20 1209    Or    sodium phosphate 21.75 mmol in dextrose 5 % 500 mL IVPB  0.32 mmol/kg Intravenous PRN Arletta Emma, APRN - CNP        potassium chloride 10 mEq/100 mL IVPB (Peripheral Line)  10 mEq Intravenous PRN Arletta Emma, APRN - CNP        insulin glargine (LANTUS) injection vial 20 Units  20 Units Subcutaneous Nightly Arletta Emma, APRN - CNP   20 Units at 08/31/20 2141    perflutren lipid microspheres (DEFINITY) injection 1.65 mg  1.5 mL Intravenous ONCE PRN Arletta Emma, APRN - CNP        sodium chloride flush 0.9 % injection 10 mL  10 mL Intravenous PRN Espinoza Reasons, DO        heparin flush 100 UNIT/ML injection 300 Units  3 mL Intravenous 2 times per day Espinoza Reasons, DO   300 Units at 08/31/20 2141    heparin flush 100 UNIT/ML injection 300 Units  3 mL Intracatheter PRN Espinoza Reasons, DO        fentaNYL (SUBLIMAZE) injection 25 mcg  25 mcg Intravenous Q3H PRN Espinoza Reasons, DO   25 mcg at 09/01/20 0745       XR CHEST PORTABLE   Final Result   Central line with tip at the cavoatrial junction.       Small right pleural effusion. No pneumothorax. XR CHEST PORTABLE   Final Result   An acute process is not appreciated. Rotated exam.         CT ABDOMEN PELVIS WO CONTRAST Additional Contrast? None   Final Result   No acute abdominopelvic findings. No substantial change in size or appearance of complex cyst at the right   hepatic dome. This could be proteinaceous or hemorrhagic although   multiphasic liver protocol MRI could further characterize if appropriate   based on patient's life expectancy. Colonic diverticulosis without evidence of diverticulitis. 3.5 cm fusiform infrarenal abdominal aortic aneurysm. See recommendations   for follow-up. RECOMMENDATIONS:   For management of fusiform AAA:      3.5-3.9 cm AAA, recommend follow-up every 2 years. References: Dion Horde Radiol 2013; 91(94):892-921; J Vasc Surg. 2018; 67:2-77               Labs:    CBC:   Recent Labs     08/30/20  0529  08/31/20  0534 08/31/20  1147 08/31/20  1616 09/01/20  0451   WBC 22.9*  --  17.2*  --   --  17.6*   HGB 8.4*   < > 7.4* 7.2* 7.6* 7.5*     --  131  --   --  142    < > = values in this interval not displayed. Lab Results   Component Value Date    IRON 73 08/29/2020    TIBC 243 (L) 08/29/2020    FERRITIN 183 08/29/2020       Lab Results   Component Value Date    CALCIUM 8.1 (L) 09/01/2020    CALCIUM 8.3 (L) 08/31/2020    CALCIUM 7.8 (L) 08/31/2020    PHOS 2.6 09/01/2020    PHOS 1.8 (L) 08/31/2020    PHOS 1.8 (L) 08/31/2020    MG 1.7 09/01/2020    MG 2.3 08/31/2020    MG 1.2 (L) 08/31/2020       BMP:   Recent Labs     08/31/20  0534 08/31/20  1616 09/01/20  0451    142 142   K 3.5 3.6 4.0    103 106   CO2 27 25 25   BUN 56* 46* 33*   CREATININE 1.4* 1.2* 1.1*   GLUCOSE 216* 129* 136*             Assessment: / Plan:    1.  Acute kidney injury stage III.  Acute kidney injury secondary renal hypoperfusion with volume depletion and concurrent use of diuretics .  Urine studies also suggest renal hypoperfusion.  Renal function has improved.     2.  Metabolic acidosis with lactic acidosis.  Improved.  Lactic acidosis due to acute kidney injury in the setting of use of metformin.     3.   Hypokalemia. Improved with supplement. 4.  Hypomagnesemia. Improved with supplement.     5.  Anemia.  Iron studies adequate.  Anemia of chronic kidney disease     6. Hypocalcemia. Will check ionized calcium, vitamin D and PTH level. Julio Cesar Muse MD  Nephrology  Electronically signed by Julio Cesar Muse MD on 9/1/2020 at 12:39 PM      Note: This report was completed utilizing computer voice recognition software. Every effort has been made to ensure accuracy, however; inadvertent computerized transcription errors may be present.

## 2020-09-01 NOTE — PROGRESS NOTES
PROGRESS NOTE    By Chanelle James D.O GI Fellow    [facility]  Dr. Cordell Oliva MD, Dr. Shereen Preciado MD, Dr Erik Lyons, Dr. Scott Gonzalez MD, Dr. Nury Terry, DO      Chiquita Benavides Breed  81 y.o.  female    SUBJECTIVE: Patient resting comofortably this am. No melana overnight per nursing. OBJECTIVE:    /69   Pulse 125   Temp 98.4 °F (36.9 °C) (Axillary)   Resp 16   Ht 5' 6\" (1.676 m)   Wt 149 lb 14.4 oz (68 kg)   SpO2 94%   BMI 24.19 kg/m²     Gen: NAD, AAO x 3  HEENT:PEERL, no icterus  Heart:a fib with rvr , no M/R/G  Lungs: CTAB  Abd.: soft, NT, ND, BS +, no G/R, no HSM  Extr.: no C/C/E, no bruising         Lab Results   Component Value Date    WBC 17.6 09/01/2020    WBC 17.2 08/31/2020    WBC 22.9 08/30/2020    HGB 7.5 09/01/2020    HGB 7.6 08/31/2020    HGB 7.2 08/31/2020    HCT 23.2 09/01/2020    MCV 94.7 09/01/2020    RDW 15.9 09/01/2020     09/01/2020     08/31/2020     08/30/2020     Lab Results   Component Value Date     09/01/2020    K 4.0 09/01/2020    K 5.0 08/27/2020     09/01/2020    CO2 25 09/01/2020    BUN 33 09/01/2020    CREATININE 1.1 09/01/2020    CALCIUM 8.1 09/01/2020    PROT 7.7 08/29/2020    LABALBU 3.4 08/29/2020    BILITOT 0.6 08/29/2020    BILITOT 0.5 05/09/2019    ALKPHOS 136 08/29/2020    ALKPHOS 136 05/09/2019    AST 34 08/29/2020    AST 20 05/09/2019    ALT 18 08/29/2020    ALT 20 05/09/2019     No results found for: LIPASE  No results found for: AMYLASE      ASSESSMENT/PLAN:    1 Melana/Acute Blood Loss Anemia   - VSS No further melena   - Hgb stable at 7.5 this am, okay for daily CBC   - EGD 8/31/2020 with duodenal bulb ulcer treated with epi -see procedure note  - Protonix 40mg daily   - Okay for diet from GI POV after seen by speech  - Okay to transfer out of ICU from GI POV     2.  Afib with RVR   - per admitting         Pt was discussed with Dr. Charley Reaves, DO  GI Fellow 9/1/2020  9:27 AM      Pt seen and independently examined. Pertinent notes and lab work reviewed. Monitor reviewed showing atrial fibrillation with RVR. D/w Dr. Dannielle Senior. Agree with physical exam and A&P.   Continue holding oral anticoagulation for the time being    Connor Tan MD  9/1/2020  10:21 AM

## 2020-09-01 NOTE — CONSULTS
Inpatient Cardiology Consultation      Reason for Consult:  New onset of AF    Consulting Physician: Dr. Haris Schmidt    Requesting Physician:  Dr. Ryan Haney     Date of Consultation: 9/1/2020    HISTORY OF PRESENT ILLNESS:   Ms. Jace Benavidez is a 81year old female who is previously known remotely to Dr. Haris Schmidt; most recently evaluated in hospital consultation on 1/17/2015 (Dr. Mariya Ozuna) for pre-op cardiovascular risk assessment prior to a upper left humerus fracture. She has a history of CABG x 3 (2013, [facility]), HTN, HLD, DM, former smoker (quit in 2013; 25 pack year smoker), COPD. Please note that the patient is a limited historian and appears drowsy at times. The following information was obtained from the patient, medical record and nursing staff. Recent Admission: 8/23/2020-8/27/2020 for C2 fracture and bilateral rib fractures. Dr. Shanna Ellison recommending soft collar. SCr 1.3 on discharge. [facility] on 8/29/2020 from  with complaints of melana. She was noted to be hypotensive (BP 85/59) and tachycardic (). Per ER documentation, patient appeared pale and noted to have positive hemoccult stool. He received 2 U PBCs, along with 1L fluid bolus. She was started on Protonix. Labs: Na+ 128, K+ 6.4, BUN/Cr 109/2.2>>2.3, LA 7.3>>given 2L IV fluid boluses>>repeat 2.6--1.3., Troponin <0.01 x 2, ALbumin 3.4, glucose 275, WBC 24.8,(Prior Hgb 12.6 8/27/2020)--> H/H 10.2/30.4>>>dropped later that day to H/H 7.0/20.8 (ordered 2 U PRBCs). CT abdomen showing 3.5 cm fusiform infrarenal abdominal aneurysm and cystic lesion, GI tract reported as normal distal esophagus stomach and duodenum. GI consulted for anemia with concerns of upper GIB -- recommending scopes once stable. General surgery consulted --> for possible sepsis / concerns for possible ischemic bowel in the setting of rising LA.    Renal consulted (8/29/2020) for LIVIA and increased anion gap metabolic acidosis with lactic acidosis thought to be secondary to renal hypoperfusion -- > recommending adequate IV hydration. Patient was admitted to ICU for further management. EGD (8/30/2020) showing Mucosa with LA Grade D esophagitis from 25cm down to 40cm GEJ at 40 cm. Moderate-severe gastritis with peripyloric nodularity and erythema. 2 cm hiatal hernia was present with a small 2mm bradly lesion present, observed best on retroflexion. Duodenitis and scattered erosions, and 1cm clean based ulcer in the duodenal sweep. Endo therapy with 3cc of 1: 10,000 epinephrine was injected around the ulcer with visible blanching. Patient was noted to be in SR on admission-->converted to AF with RVR on 9/1/2020-->treated with metoprolol. Please note: past medical records were reviewed per electronic medical record (EMR) - see detailed reports under Past Medical/ Surgical History. Past Medical History:    1. COPD with history of tobacco abuse (quit in 2013; 25 pack years)  2. HTN  3. HLD  4. DM  5. CAD with history of CABG x 3 ([facility] in 2013) -- full report unavailable for review. 6. GERD  7. Admission: 8/23/2020-8/27/2020 for C2 fracture and bilateral rib fractures. Dr. David Vang recommending soft collar. SCr 1.3 on discharge. 8. TTE: 8/29/2020: pending. 9. New onset of AF: (onset 9/1/2020 at ~1300): [id number] score at least 5 (Age, Female, HTN, DM).      Past Surgical History:    Past Surgical History:   Procedure Laterality Date    ACHILLES TENDON SURGERY Left     CARDIAC SURGERY      COLONOSCOPY      CORONARY ARTERY BYPASS GRAFT  april 2013    EYE SURGERY Right     cataract    HYSTERECTOMY      OTHER SURGICAL HISTORY Right 7 7 14    DeQuervains tendonitis thumb    UPPER GASTROINTESTINAL ENDOSCOPY N/A 8/30/2020    EGD CONTROL HEMORRHAGE performed by Maribel Silva DO at [address]  8/30/2020    EGD ESOPHAGOGASTRODUODENOSCOPY SCLEROTHERAPY performed by Maribel Silva DO at [id number]       Medications Prior to admit:  Prior to Admission medications    Medication Sig Start Date End Date Taking? Authorizing Provider   busPIRone (BUSPAR) 5 MG tablet Take 5 mg by mouth 2 times daily   Yes Historical Provider, MD   Magnesium Hydroxide (MILK OF MAGNESIA PO) Take 30 mLs by mouth daily as needed   Yes Historical Provider, MD   aspirin 81 MG chewable tablet Take 1 tablet by mouth daily 8/28/20  Yes NILTON Miller CNP   Heparin Sodium, Porcine, (HEPARIN, PORCINE,) 22053 UNIT/ML injection Inject 0.5 mLs into the skin every 8 hours 8/27/20  Yes NILTON Milelr CNP   insulin lispro (HUMALOG) 100 UNIT/ML injection vial Inject 0-12 Units into the skin 3 times daily (with meals) 8/27/20  Yes NILTON Miller CNP   insulin lispro (HUMALOG) 100 UNIT/ML injection vial Inject 0-6 Units into the skin nightly 8/27/20  Yes NILTON Miller CNP   lidocaine 4 % external patch Place 2 patches onto the skin daily 8/28/20  Yes NILTON Miller CNP   bisacodyl (DULCOLAX) 10 MG suppository Place 1 suppository rectally daily as needed for Constipation 8/27/20 9/26/20 Yes NILTON Miller CNP   sennosides-docusate sodium (SENOKOT-S) 8.6-50 MG tablet Take 1 tablet by mouth 2 times daily 8/27/20  Yes NILTON Miller CNP   methocarbamol (ROBAXIN) 750 MG tablet Take 1 tablet by mouth 3 times daily for 10 days 8/27/20 9/6/20 Yes INLTON Miller CNP   acetaminophen (TYLENOL) 325 MG tablet Take 650 mg by mouth every 6 hours as needed for Pain   Yes Historical Provider, MD   insulin glargine (LANTUS) 100 UNIT/ML injection vial Inject 20 Units into the skin nightly. 1/20/15  Yes Jyoti Hurtado MD   citalopram (CELEXA) 20 MG tablet Take 20 mg by mouth daily. Yes Historical Provider, MD   oxybutynin (DITROPAN) 5 MG tablet Take 5 mg by mouth nightly    Yes Historical Provider, MD   calcium carbonate (OYSTER SHELL CALCIUM 500 MG) 1250 MG tablet Take 1 tablet by mouth daily.    Yes Historical Provider, MD   metFORMIN (GLUCOPHAGE) 1000 MG tablet Take 1,000 mg by mouth 2 times daily (with meals). Yes Historical Provider, MD   glimepiride (AMARYL) 4 MG tablet Take 8 mg by mouth every morning (before breakfast). Yes Historical Provider, MD   spironolactone (ALDACTONE) 50 MG tablet Take 50 mg by mouth daily.     Historical Provider, MD       Current Medications:    Current Facility-Administered Medications: [START ON 9/2/2020] pantoprazole (PROTONIX) tablet 40 mg, 40 mg, Oral, QAM AC  lactated ringers infusion, , Intravenous, Continuous  metoprolol tartrate (LOPRESSOR) tablet 25 mg, 25 mg, Oral, BID  QUEtiapine (SEROQUEL) tablet 12.5 mg, 12.5 mg, Oral, BID  enoxaparin (LOVENOX) injection 40 mg, 40 mg, Subcutaneous, Daily  0.9 % sodium chloride bolus, 20 mL, Intravenous, Once  acetaminophen (TYLENOL) tablet 650 mg, 650 mg, Oral, Q6H PRN  glucose (GLUTOSE) 40 % oral gel 15 g, 15 g, Oral, PRN  dextrose 50 % IV solution, 12.5 g, Intravenous, PRN  glucagon (rDNA) injection 1 mg, 1 mg, Intramuscular, PRN  dextrose 5 % solution, 100 mL/hr, Intravenous, PRN  insulin lispro (HUMALOG) injection vial 0-12 Units, 0-12 Units, Subcutaneous, TID WC  insulin lispro (HUMALOG) injection vial 0-6 Units, 0-6 Units, Subcutaneous, Nightly  magnesium sulfate 1 g in dextrose 5% 100 mL IVPB, 1 g, Intravenous, PRN  sodium phosphate 10.89 mmol in dextrose 5 % 250 mL IVPB, 0.16 mmol/kg, Intravenous, PRN **OR** sodium phosphate 21.75 mmol in dextrose 5 % 500 mL IVPB, 0.32 mmol/kg, Intravenous, PRN  potassium chloride 10 mEq/100 mL IVPB (Peripheral Line), 10 mEq, Intravenous, PRN  insulin glargine (LANTUS) injection vial 20 Units, 20 Units, Subcutaneous, Nightly  perflutren lipid microspheres (DEFINITY) injection 1.65 mg, 1.5 mL, Intravenous, ONCE PRN  sodium chloride flush 0.9 % injection 10 mL, 10 mL, Intravenous, PRN  heparin flush 100 UNIT/ML injection 300 Units, 3 mL, Intravenous, 2 times per day  heparin flush 100 UNIT/ML injection 300 Units, 3 mL, Intracatheter, PRN  fentaNYL (SUBLIMAZE) injection 25 mcg, 25 mcg, Intravenous, Q3H PRN    Allergies:  Naproxen; Nsaids; and Vicodin [hydrocodone-acetaminophen]    Social History: Former smoker: quit in 2013 -- 30 pack years. Denies alcohol and illicit drug use. Family History: Noncontributory due to advanced age. REVIEW OF SYSTEMS: Please note that the following information is limited due to the patient is a limit historian. · Constitutional: +fatigue. Denies fevers, chills or night sweats  · Eyes: Denies visual changes or drainage  · ENT: +Neck pain -- C-Collar in place. Denies headaches or hearing loss. No mouth sores or sore throat. No epistaxis   · Cardiovascular: +Chest soreness to light palpation. No lower extremity swelling. · Respiratory: Denies GRIFFIN, cough, orthopnea or PND. No hemoptysis   · Gastrointestinal: Denies hematemesis or anorexia. No hematochezia or melena    · Genitourinary: Denies urgency, dysuria or hematuria. · Musculoskeletal: + gait disturbance, weakness or joint complaints  · Integumentary: Denies rash, hives or pruritis   · Neurological: Denies dizziness, headaches or seizures. No numbness or tingling  · Psychiatric: Denies anxiety or depression. · Endocrine: Denies temperature intolerance. No recent weight change. .  · Hematologic/Lymphatic: Denies abnormal bruising or bleeding. No swollen lymph nodes    PHYSICAL EXAM:   /73   Pulse 105   Temp 98.3 °F (36.8 °C) (Oral)   Resp 18   Ht 5' 6\" (1.676 m)   Wt 149 lb 14.4 oz (68 kg)   SpO2 90%   BMI 24.19 kg/m²   CONST:  Well developed, well nourished elderly female who appears of stated age. Awake, alert and cooperative. Confused at times. No apparent distress. HEENT:   Head- Normocephalic, atraumatic   Eyes- Conjunctivae pink, anicteric  Throat- Oral mucosa pink and moist  Neck-  No stridor, trachea midline, no jugular venous distention. No carotid bruit.     CHEST: Chest symmetrical and tender to palpation over anterior chest. No accessory muscle use or intercostal retractions  RESPIRATORY: Lung sounds - diminished in bases. CARDIOVASCULAR:     Heart Inspection- shows no noted pulsations  Heart Palpation- no heaves or thrills; PMI is non-displaced   Heart Ausculation- IRR, 1/6 JERMAINE. No s3  or rub   PV: No lower extremity edema. No varicosities. Pedal pulses palpable, no clubbing or cyanosis   ABDOMEN: Soft, obese, non-tender to light palpation. Bowel sounds present. No palpable masses no organomegaly; no abdominal bruit  MS: Good muscle strength and tone. No atrophy or abnormal movements. : Deferred  SKIN: Warm and dry no statis dermatitis or ulcers   NEURO / PSYCH: Oriented to person, place and time. Speech clear and appropriate. Follows all commands. Pleasant affect     DATA:    ECG: See HPI. Tele strips: AF with CVR  Diagnostic:      Intake/Output Summary (Last 24 hours) at 9/1/2020 1504  Last data filed at 9/1/2020 0613  Gross per 24 hour   Intake 1610.17 ml   Output --   Net 1610.17 ml       Labs:   CBC:   Recent Labs     08/31/20  0534  08/31/20 1616 09/01/20  0451   WBC 17.2*  --   --  17.6*   HGB 7.4*   < > 7.6* 7.5*   HCT 22.1*   < > 22.6* 23.2*     --   --  142    < > = values in this interval not displayed.      BMP:   Recent Labs     08/31/20 1616 09/01/20  0451    142   K 3.6 4.0   CO2 25 25   BUN 46* 33*   CREATININE 1.2* 1.1*   LABGLOM 43 47   CALCIUM 8.3* 8.1*     Mag:   Recent Labs     08/31/20 1616 09/01/20  0451   MG 2.3 1.7     Phos:   Recent Labs     08/31/20 1616 09/01/20  0451   PHOS 1.8* 2.6     HgA1c:   Lab Results   Component Value Date    LABA1C 5.9 (H) 08/30/2020     PT/INR:   Recent Labs     08/30/20 0209   PROTIME 12.1   INR 1.1     APTT:  Recent Labs     08/30/20 0209   APTT 23.5*     CARDIAC ENZYMES:  Recent Labs     08/29/20  1555 08/29/20  2352   TROPONINI <0.01 <0.01     FASTING LIPID PANEL:  Lab Results   Component Value Date    CHOL 107 08/30/2020    HDL 18 08/30/2020    [address] 36 08/30/2020    TRIG 263 08/30/2020     CXR: 8/29/2020  Central line with tip at the cavoatrial junction.         Small right pleural effusion.  No pneumothorax. CT Abdomen/Pelvis: 8/29/2020  No acute abdominopelvic findings.         No substantial change in size or appearance of complex cyst at the right    hepatic dome.  This could be proteinaceous or hemorrhagic although    multiphasic liver protocol MRI could further characterize if appropriate    based on patient's life expectancy.         Colonic diverticulosis without evidence of diverticulitis.         3.5 cm fusiform infrarenal abdominal aortic aneurysm.  See recommendations    for follow-up.         RECOMMENDATIONS:    For management of fusiform AAA:         3.5-3.9 cm AAA, recommend follow-up every 2 years. MRI Cervical Spine: 8/26/2020  Study limited by motion and lack of properly angled axial images. Edema in the lateral mass of the C2 vertebral body, compatible with    fracture. Findings compatible with C4 superior endplate fracture. Associated    marrow edema in the C4 vertebral body. Trace of anterolisthesis of C6 on C7. Multilevel degenerative disc disease, worst at C5-C6 and C6-C7,    without significant-appearing spinal canal stenosis. Assessment:  1. Admitted (8/29/2020) with melana, +hemoccult stool. +Upper GIB secondary to duodenal ulcer and esophagitis   2. Hypovolemic shock / metabolic encephalopathy -- improving. 3. New onset of AF (onset ~ 1300 on 9/1/2020): [id number] score at least 5.   4. LIVIA: improving. 5. Insulin requiring DM  6. HTN: //69  7. HLD (intolerant to statins per patient)  8. Hx of Cervical fracture -- Cervical collar in place  9. 3.5 cm Fusiform infrarenal abdominal aortic aneurysm      Plan:  1. Agree with starting Lopressor: may need to increase to TID for better rate control. 2. ECHO pending; will review  3.  Recommend [facility] due to increased risk for CVA when/if okay with GI.  4. Obtain records from [facility]. 5. Further recommendations pending the above clinical course. Will discuss with Dr. Dayana Mallory. Electronically signed by NILTON Monterroso CNP on 9/1/20 at 3:15 PM EDT    Arcadio Allison [address] MD [address]    I have personally participated in a face-to-face and personally obtained history and performed physical exam on the date of service. I reviewed chart, vitals, labs and radiologic studies. I also participated in medical decision making with NILTON Monterroso CNP on the date of service All of the assessments and recommendations are from me and I agree with all of the pertinent clinical information, assessment and treatment plan. I have reviewed and edited the note above based on my findings during my history, exam, and decision making. Please see my additional contributions to the history, physical exam, assessment, and recommendations below. HISTORY OF PRESENT ILLNESS:     Reviewed, as above      Past medical history:  Reviewed, as above. Past surgical history:  Reviewed, as above. Current medications:  Reviewed, as above    Allergies:  Reviewed, as above    Social history:  Reviewed, as above    Family medical history:  Reviewed, as above. REVIEW OF SYSTEMS:   Reviewed, as above. PHYSICAL EXAM:   CONSTITUTIONAL:  awake, alert, cooperative, no apparent distress, and appears stated age  EYES:  lids and lashes normal and pupils equal, round and reactive to light, anicteric sclerae  HEAD:  normocepalic, without obvious abnormality, atraumatic, pink, moist mucous membranes.   NECK:  Supple, symmetrical, trachea midline, no adenopathy, thyroid symmetric, not enlarged and no tenderness, skin normal  HEMATOLOGIC/LYMPHATICS:  no cervical lymphadenopathy and no supraclavicular lymphadenopathy  LUNGS:  No increased work of breathing, good air exchange, clear to auscultation bilaterally, no crackles or wheezing  CARDIOVASCULAR:  Normal apical impulse, irregularly irregular, tachycardic, normal S1 and S2, no S3, 2 out of 6 systolic murmur at the apex, 3/6 systolic murmur at the left lower sternal border, + JVD, no carotid bruit, no pedal edema, good carotid upstroke bilaterally. ABDOMEN:  Soft, nontender, no masses, no hepatomegaly or splenomegaly, BS+  CHEST: nontender to palpation, expands symmetrically  MUSCULOSKELETAL:  No clubbing no cyanosis. there is no redness, warmth, or swelling of the joints  NEUROLOGIC:  Alert, awake  SKIN:  no bruising or bleeding, normal skin color, texture, turgor and no redness, warmth, or swelling        BP (!) 97/51   Pulse 87   Temp 97.8 °F (36.6 °C) (Infrared)   Resp (!) 34   Ht 5' 6\" (1.676 m)   Wt 149 lb 14.4 oz (68 kg)   SpO2 98%   BMI 24.19 kg/m²     DATA:   I personally reviewed the admission EKG with the following interpretation: Atrial fibrillation with RVR, septal infarct age undetermined, old inferior wall MI age undetermined    ECHO: Not performed to date  Stress Test: Not performed to date  Angiography: Not performed to date  Cardiology Labs:   BMP:    Lab Results   Component Value Date     09/01/2020    K 4.0 09/01/2020    K 5.0 08/27/2020     09/01/2020    CO2 25 09/01/2020    BUN 33 09/01/2020     CMP:    Lab Results   Component Value Date     09/01/2020    K 4.0 09/01/2020    K 5.0 08/27/2020     09/01/2020    CO2 25 09/01/2020    BUN 33 09/01/2020    PROT 7.7 08/29/2020     CBC:    Lab Results   Component Value Date    WBC 17.6 09/01/2020    RBC 2.45 09/01/2020    HGB 7.5 09/01/2020    HCT 23.2 09/01/2020    MCV 94.7 09/01/2020    RDW 15.9 09/01/2020     09/01/2020     PT/INR:  No results found for: PTINR  PT/INR Warfarin:  No components found for: PTPATWAR, PTINRWAR  PTT:    Lab Results   Component Value Date    APTT 23.5 08/30/2020     PTT Heparin:  No components found for: APTTHEP  Magnesium:    Lab Results   Component Value Date    MG 1.7 09/01/2020     TSH: Lab Results   Component Value Date    TSH 6.280 05/09/2019     TROPONIN:  No components found for: TROP  BNP:  No results found for: BNP  FASTING LIPID PANEL:    Lab Results   Component Value Date    CHOL 107 08/30/2020    HDL 18 08/30/2020    TRIG 263 08/30/2020     XR CHEST PORTABLE   Final Result   Central line with tip at the cavoatrial junction. Small right pleural effusion. No pneumothorax. XR CHEST PORTABLE   Final Result   An acute process is not appreciated. Rotated exam.         CT ABDOMEN PELVIS WO CONTRAST Additional Contrast? None   Final Result   No acute abdominopelvic findings. No substantial change in size or appearance of complex cyst at the right   hepatic dome. This could be proteinaceous or hemorrhagic although   multiphasic liver protocol MRI could further characterize if appropriate   based on patient's life expectancy. Colonic diverticulosis without evidence of diverticulitis. 3.5 cm fusiform infrarenal abdominal aortic aneurysm. See recommendations   for follow-up. RECOMMENDATIONS:   For management of fusiform AAA:      3.5-3.9 cm AAA, recommend follow-up every 2 years. References: Kendra Hatter Radiol 2013; 16(03):771-023; J Vasc Surg. 2018; 67:2-77           I have personally reviewed the laboratory, cardiac diagnostic and radiographic testing as outlined above:    IMPRESSION:  1. Atrial fibrillation with RVR: Newly diagnosed, paroxysmal, rate is not controlled, will start on IV Cardizem, will give digoxin  2. Hypertension: Controlled, hypertensive at time  3. Pulmonary edema: Secondary to IV fluids, will give Lasix  4. CAD: S/p CABG in 2013, details are unknown  5. Hyperlipidemia  6. Abdominal aortic aneurysm  7.  Status post hypovolemic shock secondary to GI bleed      RECOMMENDATIONS:   IV Cardizem  IV digoxin  Lasix 20 mg IV once  Continue the rest of medications  Will review echocardiogram  Further cardiac recommendations will be forthcoming pending her clinical course and diagnostic test findings    No family at bedside    Thank you for the consult  Electronically signed by Ashley Crouch MD on 9/1/2020 at 9:46 PM  NOTE: This report was transcribed using voice recognition software.  Every effort was made to ensure accuracy; however, inadvertent computerized transcription errors may be present

## 2020-09-01 NOTE — PLAN OF CARE
Problem:  Bowel Function - Altered:  Goal: Bowel elimination is within specified parameters  Description: Bowel elimination is within specified parameters  Outcome: Met This Shift     Problem: Fluid Volume - Imbalance:  Goal: Absence of imbalanced fluid volume signs and symptoms  Description: Absence of imbalanced fluid volume signs and symptoms  Outcome: Met This Shift  Goal: Will show no signs and symptoms of excessive bleeding  Description: Will show no signs and symptoms of excessive bleeding  Outcome: Met This Shift

## 2020-09-01 NOTE — PROGRESS NOTES
Internal Medicine Progress Note    [facility]    Opal Puente. Lexii Kearney., RAMONITAOShannonI. Nidia Jhaveri D.O., YANIQUE Johns D.O. Isaias Mesa, MSN, APRN, NP-C  Gary Strong. Emory Willoughby, MSN, APRN-CNP     Primary Care Physician: Jean Valenzuela MD   Admitting Physician:  Shelley Hernandez DO  Admission date and time: 8/29/2020  3:07 AM    Room:  [place] [address]  Admitting diagnosis: GI bleed [K92.2]    Patient Name: Seda Lane  MRN: [id number]    Date of Service: 9/1/2020     Subjective:  Duke Rosales is a 81 y.o. female who was seen and examined today,9/1/2020, at the bedside. Unfortunately, Duke Rosales has become rather defiant with multiple requests. He oftentimes refuses activity and refuses oral ingestion. No family members were present during my examination but I have updated her daughter and granddaughter over the past 50 hours. Her hemoglobin has stabilized at this point. She is acceptable for transfer from the intensive care unit. Review of System:   Constitutional:   Admits to generalized malaise and fatigue. HEENT:   Denies ear pain, sore throat, sinus or eye problems. Admits to neck pain associated with her recent cervical fracture. Admits to discomfort associated with the neck brace. Cardiovascular:   Denies any chest pain, irregular heartbeats, or palpitations. Respiratory:   Denies shortness of breath, coughing, sputum production, hemoptysis, or wheezing. Gastrointestinal:   No nausea or vomiting. She is refusing oral intake. Genitourinary:    Denies any urgency, frequency, hematuria. Voiding with the use of a Gonzalez catheter. Extremities:   Denies lower extremity swelling, edema or cyanosis. Neurology:    Denies any headache or focal neurological deficits, Denies generalized weakness or memory difficulty. Psch:   Denies being anxious or depressed. Musculoskeletal:    Denies  myalgias, joint complaints or back pain. Integumentary:   Denies any rashes, ulcers, or excoriations. Denies bruising. Hematologic/Lymphatic:  Denies bruising or bleeding. Physical Exam:  No intake/output data recorded. Intake/Output Summary (Last 24 hours) at 9/1/2020 1245  Last data filed at 9/1/2020 0613  Gross per 24 hour   Intake 1610.17 ml   Output 650 ml   Net 960.17 ml   I/O last 3 completed shifts: In: 1630.2 [P.O.:20; I.V.:1160.2; IV Piggyback:450]  Out: 650 [Urine:650]  Patient Vitals for the past 96 hrs (Last 3 readings):   Weight   08/29/20 1350 149 lb 14.4 oz (68 kg)   08/29/20 0308 150 lb (68 kg)     Vital Signs:   Blood pressure 116/73, pulse 105, temperature 98.3 °F (36.8 °C), temperature source Oral, resp. rate 18, height 5' 6\" (1.676 m), weight 149 lb 14.4 oz (68 kg), SpO2 90 %. General appearance: For more awake and alert today. She is oriented x3. Head:  Normocephalic. No masses, lesions or tenderness. Eyes:  PERRLA. EOMI. Sclera clear. Buccal mucosa moist.  ENT:  Neck braces in place. Neck:    Supple. Trachea midline. No thyromegaly. No JVD. No bruits. Heart:    Rhythm regular. Rate controlled. No murmurs. Lungs:    Symmetrical. Clear to auscultation bilaterally. No wheezes. No rhonchi. No rales. Abdomen:   Soft. Non-tender. Non-distended. Bowel sounds positive. No organomegaly or masses. No pain on palpation. Extremities:    Peripheral pulses present. No peripheral edema. No ulcers. No cyanosis. No clubbing. Neurologic:    Alert x 3. No focal deficit. Cranial nerves grossly intact. No focal weakness. Psych:   Behavior is normal. Mood appears normal. Speech is not rapid and/or pressured. Musculoskeletal:   Spine ROM normal. Muscular strength intact. Gait not assessed. Integumentary:  No rashes  Skin normal color and texture. Genitalia/Breast:  Voiding with use of a Gonzalez catheter.     Medication:  Scheduled Meds:   [START ON 9/2/2020] pantoprazole  40 mg Oral QAM AC    metoprolol tartrate  25 mg Oral BID    QUEtiapine  12.5 mg Oral BID    enoxaparin  40 mg Subcutaneous Daily    sodium chloride  20 mL Intravenous Once    insulin lispro  0-12 Units Subcutaneous TID WC    insulin lispro  0-6 Units Subcutaneous Nightly    insulin glargine  20 Units Subcutaneous Nightly    heparin flush  3 mL Intravenous 2 times per day     Continuous Infusions:   lactated ringers 75 mL/hr at 09/01/20 1021    dextrose         Objective Data:  CBC with Differential:    Lab Results   Component Value Date    WBC 17.6 09/01/2020    RBC 2.45 09/01/2020    HGB 7.5 09/01/2020    HCT 23.2 09/01/2020     09/01/2020    MCV 94.7 09/01/2020    MCH 30.6 09/01/2020    MCHC 32.3 09/01/2020    RDW 15.9 09/01/2020    LYMPHOPCT 6.0 09/01/2020    MONOPCT 8.5 09/01/2020    BASOPCT 0.1 09/01/2020    MONOSABS 1.49 09/01/2020    LYMPHSABS 1.05 09/01/2020    EOSABS 0.00 09/01/2020    BASOSABS 0.01 09/01/2020     BMP:    Lab Results   Component Value Date     09/01/2020    K 4.0 09/01/2020    K 5.0 08/27/2020     09/01/2020    CO2 25 09/01/2020    BUN 33 09/01/2020    LABALBU 3.4 08/29/2020    CREATININE 1.1 09/01/2020    CALCIUM 8.1 09/01/2020    GFRAA 57 09/01/2020    LABGLOM 47 09/01/2020    GLUCOSE 136 09/01/2020       Assessment:  1. Acute upper GI bleed with hemorrhagic anemia resulting in hypovolemic shock with endoscopic findings of duodenitis with duodenal ulceration along with esophagitis and gastritis  2. Metabolic encephalopathy with improvement  3. Acute renal failure with associated hyperkalemia and metabolic acidosis  4. Recent cervical fracture requiring cervical bracing  with concomitant rib fracture  5. 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 Insulin-dependent diabetes mellitus type 2    Plan:   Luly Smith has become rather defiant in treatment at this point. She is refusing oral intake and is refusing increasing activity.   Our hands are somewhat tight at this point. From a laboratory standpoint and vital signs standpoint, she continues to improve. No family members were present during my examination but I have previously updated her daughter and granddaughter. I will discuss the case with them when they present to the bedside. She is acceptable for transfer from the intensive care unit. Discharge planning is underway with consideration for LTAC versus SNF. Greater than 40 minutes of critical care time was spent with the patient. This time included chart review, , and discussion with those consultants involved in the patient's care. More than 50% of my  time was spent at the bedside counseling/coordinating care with the patient and/or family with face to face contact. This time was spent reviewing notes and laboratory data as well as instructing and counseling the patient. Time I spent with the family or surrogate(s) is included only if the patient was incapable of providing the necessary information or participating in medical decisions. I also discussed the differential diagnosis and all of the proposed management plans with the patient and individuals accompanying the patient. Chiquita requires this high level of physician care and nursing in the ICU due to complexity of decision management and chance of rapid decline or death. Continued cardiac monitoring and higher level of nursing are required. I am readily available for any further decision-making and intervention.      Susy Martinez DO, F.A.C.O.I.  9/1/2020  12:45 PM

## 2020-09-01 NOTE — PROGRESS NOTES
Speech Language Pathology  SPEECH LANGUAGE PATHOLOGY  DAILY PROGRESS NOTE        PATIENT NAME:  Esequiel Magana      :  1935          TODAY'S DATE:  2020 ROOM:  [place] [address]    Pt resting in bed, seen in f/u for dysphagia management with soft collar on. SLP presented pt with thin liquids via tsp which she consistently tolerated without overt clinical indicators aspiration. However, when presented with thin liquids via straw, pt demonstrated a positive cough response with 50% of trials. Clinician attempted to engage pt in dysphagia therapy exercises for OM strength and ROM as well as laryngeal strength and ROM with limited outcome d/t pt's cognition. Will cont as per POC and will advance diet as clinically indicated. Latha Lozano M.Ed. CCC-SLP  [place] [id number]    CPT code(s) 20085  dysphagia tx   Total minutes :  15 minutes

## 2020-09-02 LAB
ANION GAP SERPL CALCULATED.3IONS-SCNC: 10 MMOL/L (ref 7–16)
BASOPHILS ABSOLUTE: 0.01 E9/L (ref 0–0.2)
BASOPHILS RELATIVE PERCENT: 0.1 % (ref 0–2)
BUN BLDV-MCNC: 23 MG/DL (ref 8–23)
CALCIUM IONIZED: 1.39 MMOL/L (ref 1.15–1.33)
CALCIUM SERPL-MCNC: 8.7 MG/DL (ref 8.6–10.2)
CHLORIDE BLD-SCNC: 105 MMOL/L (ref 98–107)
CO2: 25 MMOL/L (ref 22–29)
CREAT SERPL-MCNC: 1 MG/DL (ref 0.5–1)
EOSINOPHILS ABSOLUTE: 0.12 E9/L (ref 0.05–0.5)
EOSINOPHILS RELATIVE PERCENT: 0.9 % (ref 0–6)
GFR AFRICAN AMERICAN: >60
GFR NON-AFRICAN AMERICAN: 53 ML/MIN/1.73
GLUCOSE BLD-MCNC: 77 MG/DL (ref 74–99)
HCT VFR BLD CALC: 24.7 % (ref 34–48)
HEMOGLOBIN: 8 G/DL (ref 11.5–15.5)
IMMATURE GRANULOCYTES #: 0.11 E9/L
IMMATURE GRANULOCYTES %: 0.8 % (ref 0–5)
LYMPHOCYTES ABSOLUTE: 0.89 E9/L (ref 1.5–4)
LYMPHOCYTES RELATIVE PERCENT: 6.8 % (ref 20–42)
MAGNESIUM: 1.2 MG/DL (ref 1.6–2.6)
MCH RBC QN AUTO: 31.1 PG (ref 26–35)
MCHC RBC AUTO-ENTMCNC: 32.4 % (ref 32–34.5)
MCV RBC AUTO: 96.1 FL (ref 80–99.9)
METER GLUCOSE: 107 MG/DL (ref 74–99)
METER GLUCOSE: 146 MG/DL (ref 74–99)
METER GLUCOSE: 83 MG/DL (ref 74–99)
MONOCYTES ABSOLUTE: 1.11 E9/L (ref 0.1–0.95)
MONOCYTES RELATIVE PERCENT: 8.5 % (ref 2–12)
NEUTROPHILS ABSOLUTE: 10.81 E9/L (ref 1.8–7.3)
NEUTROPHILS RELATIVE PERCENT: 82.9 % (ref 43–80)
PARATHYROID HORMONE INTACT: 307 PG/ML (ref 15–65)
PDW BLD-RTO: 16.4 FL (ref 11.5–15)
PHOSPHORUS: 1.9 MG/DL (ref 2.5–4.5)
PLATELET # BLD: 146 E9/L (ref 130–450)
PMV BLD AUTO: 8.9 FL (ref 7–12)
POTASSIUM SERPL-SCNC: 3.9 MMOL/L (ref 3.5–5)
RBC # BLD: 2.57 E12/L (ref 3.5–5.5)
SODIUM BLD-SCNC: 140 MMOL/L (ref 132–146)
URINE CULTURE, ROUTINE: NORMAL
VITAMIN D 25-HYDROXY: 35 NG/ML (ref 30–100)
WBC # BLD: 13.1 E9/L (ref 4.5–11.5)

## 2020-09-02 PROCEDURE — 85025 COMPLETE CBC W/AUTO DIFF WBC: CPT

## 2020-09-02 PROCEDURE — 2700000000 HC OXYGEN THERAPY PER DAY

## 2020-09-02 PROCEDURE — 83970 ASSAY OF PARATHORMONE: CPT

## 2020-09-02 PROCEDURE — 83735 ASSAY OF MAGNESIUM: CPT

## 2020-09-02 PROCEDURE — 2060000000 HC ICU INTERMEDIATE R&B

## 2020-09-02 PROCEDURE — 80048 BASIC METABOLIC PNL TOTAL CA: CPT

## 2020-09-02 PROCEDURE — 6370000000 HC RX 637 (ALT 250 FOR IP): Performed by: INTERNAL MEDICINE

## 2020-09-02 PROCEDURE — 36415 COLL VENOUS BLD VENIPUNCTURE: CPT

## 2020-09-02 PROCEDURE — 6360000002 HC RX W HCPCS: Performed by: INTERNAL MEDICINE

## 2020-09-02 PROCEDURE — 94669 MECHANICAL CHEST WALL OSCILL: CPT

## 2020-09-02 PROCEDURE — APPSS30 APP SPLIT SHARED TIME 16-30 MINUTES: Performed by: NURSE PRACTITIONER

## 2020-09-02 PROCEDURE — 82962 GLUCOSE BLOOD TEST: CPT

## 2020-09-02 PROCEDURE — 6370000000 HC RX 637 (ALT 250 FOR IP): Performed by: HOSPITALIST

## 2020-09-02 PROCEDURE — 2580000003 HC RX 258: Performed by: INTERNAL MEDICINE

## 2020-09-02 PROCEDURE — 36592 COLLECT BLOOD FROM PICC: CPT

## 2020-09-02 PROCEDURE — 82330 ASSAY OF CALCIUM: CPT

## 2020-09-02 PROCEDURE — 82306 VITAMIN D 25 HYDROXY: CPT

## 2020-09-02 PROCEDURE — 6370000000 HC RX 637 (ALT 250 FOR IP): Performed by: NURSE PRACTITIONER

## 2020-09-02 PROCEDURE — 84100 ASSAY OF PHOSPHORUS: CPT

## 2020-09-02 PROCEDURE — 99232 SBSQ HOSP IP/OBS MODERATE 35: CPT | Performed by: INTERNAL MEDICINE

## 2020-09-02 RX ORDER — MAGNESIUM SULFATE IN WATER 40 MG/ML
2 INJECTION, SOLUTION INTRAVENOUS ONCE
Status: COMPLETED | OUTPATIENT
Start: 2020-09-02 | End: 2020-09-02

## 2020-09-02 RX ORDER — PANTOPRAZOLE SODIUM 40 MG/1
40 TABLET, DELAYED RELEASE ORAL
Status: DISCONTINUED | OUTPATIENT
Start: 2020-09-02 | End: 2020-09-03 | Stop reason: HOSPADM

## 2020-09-02 RX ADMIN — POTASSIUM & SODIUM PHOSPHATES POWDER PACK 280-160-250 MG 250 MG: 280-160-250 PACK at 20:05

## 2020-09-02 RX ADMIN — METOPROLOL TARTRATE 25 MG: 25 TABLET, FILM COATED ORAL at 20:04

## 2020-09-02 RX ADMIN — METOPROLOL TARTRATE 25 MG: 25 TABLET, FILM COATED ORAL at 10:09

## 2020-09-02 RX ADMIN — Medication 300 UNITS: at 10:10

## 2020-09-02 RX ADMIN — DIGOXIN 250 MCG: 250 INJECTION, SOLUTION INTRAMUSCULAR; INTRAVENOUS; PARENTERAL at 00:53

## 2020-09-02 RX ADMIN — MAGNESIUM SULFATE 2 G: 2 INJECTION INTRAVENOUS at 12:29

## 2020-09-02 RX ADMIN — INSULIN GLARGINE 20 UNITS: 100 INJECTION, SOLUTION SUBCUTANEOUS at 20:08

## 2020-09-02 RX ADMIN — QUETIAPINE FUMARATE 12.5 MG: 25 TABLET ORAL at 10:09

## 2020-09-02 RX ADMIN — ENOXAPARIN SODIUM 40 MG: 40 INJECTION SUBCUTANEOUS at 10:10

## 2020-09-02 RX ADMIN — FENTANYL CITRATE 25 MCG: 50 INJECTION, SOLUTION INTRAMUSCULAR; INTRAVENOUS at 20:02

## 2020-09-02 RX ADMIN — INSULIN LISPRO 1 UNITS: 100 INJECTION, SOLUTION INTRAVENOUS; SUBCUTANEOUS at 20:08

## 2020-09-02 RX ADMIN — SODIUM CHLORIDE, PRESERVATIVE FREE 10 ML: 5 INJECTION INTRAVENOUS at 20:04

## 2020-09-02 RX ADMIN — Medication 300 UNITS: at 20:04

## 2020-09-02 RX ADMIN — PANTOPRAZOLE SODIUM 40 MG: 40 TABLET, DELAYED RELEASE ORAL at 17:24

## 2020-09-02 RX ADMIN — POTASSIUM & SODIUM PHOSPHATES POWDER PACK 280-160-250 MG 250 MG: 280-160-250 PACK at 15:00

## 2020-09-02 RX ADMIN — PANTOPRAZOLE SODIUM 40 MG: 40 TABLET, DELAYED RELEASE ORAL at 06:22

## 2020-09-02 RX ADMIN — QUETIAPINE FUMARATE 12.5 MG: 25 TABLET ORAL at 20:04

## 2020-09-02 ASSESSMENT — PAIN DESCRIPTION - LOCATION: LOCATION: RIB CAGE

## 2020-09-02 ASSESSMENT — PAIN DESCRIPTION - ORIENTATION: ORIENTATION: RIGHT

## 2020-09-02 ASSESSMENT — PAIN SCALES - GENERAL
PAINLEVEL_OUTOF10: 0
PAINLEVEL_OUTOF10: 0
PAINLEVEL_OUTOF10: 8
PAINLEVEL_OUTOF10: 0
PAINLEVEL_OUTOF10: 0

## 2020-09-02 ASSESSMENT — PAIN DESCRIPTION - PAIN TYPE: TYPE: ACUTE PAIN

## 2020-09-02 ASSESSMENT — PAIN DESCRIPTION - DESCRIPTORS: DESCRIPTORS: ACHING;DISCOMFORT;SORE

## 2020-09-02 NOTE — PROGRESS NOTES
Nephrology Note  Arlet Fu MD          Patient seen and examined. Chart reviewed. Patient's daughter is present at bedside. Oral intake is somewhat poor. No shortness of breath. No nausea vomiting or dysgeusia. Patient is awake and alert and in no acute distress. .    Vital SignsBP (!) 142/63   Pulse 67   Temp 97.9 °F (36.6 °C) (Infrared)   Resp 22   Ht 5' 6\" (1.676 m)   Wt 151 lb 8 oz (68.7 kg)   SpO2 96%   BMI 24.45 kg/m²   24HR INTAKE/OUTPUT:    Intake/Output Summary (Last 24 hours) at 9/2/2020 1158  Last data filed at 9/2/2020 0622  Gross per 24 hour   Intake 1559.88 ml   Output 725 ml   Net 834.88 ml         Physical Exam       HEENT: Dry oral mucosa  Neck: No JVD  Lungs: Breath sounds decreased at the bases. No rales or ronchi. Heart: Regular rate and rhythm. No S3 gallop.  Grade 2/6 systolic murmur along the left sternal border  Abdomen: Soft non distended, non tender and normal bowel sounds.   Extremeties: No edema.        ROS:  RESPIRATORY:  negative  CARDIOVASCULAR:  negative  GASTROINTESTINAL:  positive  For poor appetite       Current Facility-Administered Medications   Medication Dose Route Frequency Provider Last Rate Last Dose    potassium & sodium phosphates (PHOS-NAK) 280-160-250 MG packet 250 mg  1 packet Oral TID Marin Stacey Kate MD        magnesium sulfate 2 g in 50 mL IVPB premix  2 g Intravenous Once Dorothy Ragsdale MD        pantoprazole (PROTONIX) tablet 40 mg  40 mg Oral QAM  Malik Gomes DO   40 mg at 09/02/20 1959    lactated ringers infusion   Intravenous Continuous Kam Escobar MD 75 mL/hr at 09/01/20 2243      metoprolol tartrate (LOPRESSOR) tablet 25 mg  25 mg Oral BID Kendy Duggan MD   25 mg at 09/02/20 1009    QUEtiapine (SEROQUEL) tablet 12.5 mg  12.5 mg Oral BID Kam Escobar MD   12.5 mg at 09/02/20 1009    enoxaparin (LOVENOX) injection 40 mg  40 mg Subcutaneous Daily Kendy Duggan MD   40 mg at 09/02/20 1010    dilTIAZem 125 mg in dextrose 5 % 125 mL infusion  5 mg/hr Intravenous Continuous Vinny Waller MD   Stopped at 09/02/20 0130    perflutren lipid microspheres (DEFINITY) injection 1.65 mg  1.5 mL Intravenous ONCE PRN Vinny Waller MD        0.9 % sodium chloride bolus  20 mL Intravenous Once Ariane Benitez DO        acetaminophen (TYLENOL) tablet 650 mg  650 mg Oral Q6H PRN Gonzalez Severino DO   650 mg at 08/29/20 1055    glucose (GLUTOSE) 40 % oral gel 15 g  15 g Oral PRN Niccimon Massy, APRN - CNP        dextrose 50 % IV solution  12.5 g Intravenous PRN Tilmon Massy, APRN - CNP        glucagon (rDNA) injection 1 mg  1 mg Intramuscular PRN Tilmon Massy, APRN - CNP        dextrose 5 % solution  100 mL/hr Intravenous PRN Tilmon Massy, APRN - CNP        insulin lispro (HUMALOG) injection vial 0-12 Units  0-12 Units Subcutaneous TID WC Tilmon Massy, APRN - CNP   6 Units at 08/31/20 1223    insulin lispro (HUMALOG) injection vial 0-6 Units  0-6 Units Subcutaneous Nightly Tilmon Massy, APRN - CNP   2 Units at 09/01/20 2008    magnesium sulfate 1 g in dextrose 5% 100 mL IVPB  1 g Intravenous PRN Tilmon Massy, APRN - CNP   Stopped at 08/31/20 1209    sodium phosphate 10.89 mmol in dextrose 5 % 250 mL IVPB  0.16 mmol/kg Intravenous PRN Tilmon Massy, APRN - CNP   Stopped at 08/31/20 1209    Or    sodium phosphate 21.75 mmol in dextrose 5 % 500 mL IVPB  0.32 mmol/kg Intravenous PRN Tilmon Massy, APRN - CNP        potassium chloride 10 mEq/100 mL IVPB (Peripheral Line)  10 mEq Intravenous PRN Niccimon Massy, APRN - CNP        insulin glargine (LANTUS) injection vial 20 Units  20 Units Subcutaneous Nightly Tilmon Massy, APRN - CNP   20 Units at 09/01/20 2008    perflutren lipid microspheres (DEFINITY) injection 1.65 mg  1.5 mL Intravenous ONCE PRN Kendra Jefferyy, APRN - CNP        sodium chloride flush 0.9 % injection 10 mL  10 mL Intravenous PRN Angel Caba DO   10 mL at 09/01/20 2006    heparin flush 100 UNIT/ML injection 300 Units  3 mL Intravenous 2 times per day Colie Pun, DO   300 Units at 09/02/20 1010    heparin flush 100 UNIT/ML injection 300 Units  3 mL Intracatheter PRN Colie Pun, DO        fentaNYL (SUBLIMAZE) injection 25 mcg  25 mcg Intravenous Q3H PRN Colie Pun, DO   25 mcg at 09/01/20 2005       XR CHEST PORTABLE   Final Result   Central line with tip at the cavoatrial junction. Small right pleural effusion. No pneumothorax. XR CHEST PORTABLE   Final Result   An acute process is not appreciated. Rotated exam.         CT ABDOMEN PELVIS WO CONTRAST Additional Contrast? None   Final Result   No acute abdominopelvic findings. No substantial change in size or appearance of complex cyst at the right   hepatic dome. This could be proteinaceous or hemorrhagic although   multiphasic liver protocol MRI could further characterize if appropriate   based on patient's life expectancy. Colonic diverticulosis without evidence of diverticulitis. 3.5 cm fusiform infrarenal abdominal aortic aneurysm. See recommendations   for follow-up. RECOMMENDATIONS:   For management of fusiform AAA:      3.5-3.9 cm AAA, recommend follow-up every 2 years. References: Ivory Rank Radiol 2013; 21(30):209-570; J Vasc Surg. 2018; 67:2-77               Labs:    CBC:   Recent Labs     08/31/20  0534  08/31/20  1616 09/01/20  0451 09/02/20  0554   WBC 17.2*  --   --  17.6* 13.1*   HGB 7.4*   < > 7.6* 7.5* 8.0*     --   --  142 146    < > = values in this interval not displayed.         Lab Results   Component Value Date    IRON 73 08/29/2020    TIBC 243 (L) 08/29/2020    FERRITIN 183 08/29/2020       Lab Results   Component Value Date     (H) 09/02/2020    CALCIUM 8.7 09/02/2020    CALCIUM 8.1 (L) 09/01/2020    CALCIUM 8.3 (L) 08/31/2020    CAION 1.39 (H) 09/02/2020    PHOS 1.9 (L) 09/02/2020    PHOS 2.6 09/01/2020    PHOS 1.8 (L) 08/31/2020 MG 1.2 (L) 09/02/2020    MG 1.7 09/01/2020    MG 2.3 08/31/2020       BMP:   Recent Labs     08/31/20  1616 09/01/20  0451 09/02/20  0554    142 140   K 3.6 4.0 3.9    106 105   CO2 25 25 25   BUN 46* 33* 23   CREATININE 1.2* 1.1* 1.0   GLUCOSE 129* 136* 77             Assessment: / Plan:    1.  Acute kidney injury stage III.  Acute kidney injury secondary renal hypoperfusion with volume depletion and concurrent use of diuretics . Urine studies also suggest renal hypoperfusion.  Renal function has improved.     2.  Hypophosphatemia. Reflects poor oral intake. Will supplement.       3.   Hypokalemia. Improved with supplement.     4.  Hypomagnesemia. Improved with supplement.     5.  Anemia.  Iron studies adequate.  Anemia of chronic kidney disease      6. Hypocalcemia. Calcium levels improved and actually the ionized calcium is somewhat high. PTH elevated at 307 pg/ml. , vitamin D is adequate at 35 ng/ml. PTH is inappropriately high and this may be reflection of acute kidney injury. Will recheck PTH and hold off on starting vitamin D analog such as Calcitrol for secondary hyperparathyroidism due to renal insufficiency. 7.   Metabolic acidosis with lactic acidosis.  Improved.  Lactic acidosis due to acute kidney injury in the setting of use of metformin.         Michelle Teran MD  Nephrology  Electronically signed by Michelle Teran MD on 9/2/2020 at 11:56 AM      Note: This report was completed utilizing computer voice recognition software. Every effort has been made to ensure accuracy, however; inadvertent computerized transcription errors may be present.

## 2020-09-02 NOTE — PLAN OF CARE
Problem: Falls - Risk of:  Goal: Will remain free from falls  Description: Will remain free from falls  Outcome: Met This Shift  Goal: Absence of physical injury  Description: Absence of physical injury  Outcome: Met This Shift     Problem: Skin Integrity:  Goal: Will show no infection signs and symptoms  Description: Will show no infection signs and symptoms  Outcome: Met This Shift  Goal: Absence of new skin breakdown  Description: Absence of new skin breakdown  Outcome: Met This Shift     Problem:  Bowel Function - Altered:  Goal: Bowel elimination is within specified parameters  Description: Bowel elimination is within specified parameters  Outcome: Met This Shift

## 2020-09-02 NOTE — PROGRESS NOTES
PROGRESS NOTE  By Marcy Davis M.D. [facility]  Dr. Benjamín Pavon M.D.,  Dr. Wesly High M.D.,   MORELIA FaustinO.,  Dr. Becca Goodrich M.D.,  Dr. Khanh Aguirre D.O.,  Josey Kitchen D.O. Chiquita Vallecillo  81 y.o.  female    SUBJECTIVE:  Denies abdominal pain. Reports that feels better    OBJECTIVE:    BP (!) 142/63   Pulse 67   Temp 97.9 °F (36.6 °C) (Infrared)   Resp 22   Ht 5' 6\" (1.676 m)   Wt 151 lb 8 oz (68.7 kg)   SpO2 96%   BMI 24.45 kg/m²     General: NAD/elderly  female  HEENT: Anicteric sclera/moist oral mucosa  Neck: Trachea midline. Soft neck collar  Chest: CTA B  Cor: Regular  Abd.: Soft and obese. Nontender  Extr.:  BLE trace edema. Decreased muscle tone of all throughout  Skin: Warm and dry/anicteric        DATA:    Monitor data reviewed -sinus rhythm noted. Lab Results   Component Value Date    WBC 13.1 09/02/2020    RBC 2.57 09/02/2020    HGB 8.0 09/02/2020    HCT 24.7 09/02/2020    MCV 96.1 09/02/2020    MCH 31.1 09/02/2020    MCHC 32.4 09/02/2020    RDW 16.4 09/02/2020     09/02/2020    MPV 8.9 09/02/2020     Lab Results   Component Value Date     09/02/2020    K 3.9 09/02/2020    K 5.0 08/27/2020     09/02/2020    CO2 25 09/02/2020    BUN 23 09/02/2020    CREATININE 1.0 09/02/2020    CALCIUM 8.7 09/02/2020    PROT 7.7 08/29/2020    LABALBU 3.4 08/29/2020    BILITOT 0.6 08/29/2020    ALKPHOS 136 08/29/2020    AST 34 08/29/2020    ALT 18 08/29/2020     No results found for: LIPASE  No results found for: AMYLASE      ASSESSMENT/PLAN:    1 Melana/Acute Blood Loss Anemia   - VSS No further melena   - Hgb stable  -Monitor H&H daily   - EGD 8/31/2020 with duodenal bulb ulcer treated with epi -see procedure note  - Protonix bid especially given plans for anticoagulation     2.  Afib with RVR   -Converted to sinus rhythm  -[id number] score 7  -Recommend restarting anticoagulation 7 to 10 days after procedure (EGD 8/30) -consider restarting anticoagulation next week -must exercise caution given patient advanced age and propensity for falls  - per admitting       NOTE:  This report was transcribed using voice recognition software. Every effort was made to ensure accuracy; however, inadvertent computerized transcription errors may be present.     Hema Faye MD  9/2/2020  1:13 PM

## 2020-09-02 NOTE — PROGRESS NOTES
Internal Medicine Progress Note    [facility]    Jacob Lin. Alin Degroot., JV.A.PAMELLAO.I. Layla Paulson D.O., RAMONITAOCONCHA Lincoln D.O. Katharina Lemus, MSN, APRN, NP-C  Kd Jewell. Carlos Bustamante, MSN, APRN-CNP     Primary Care Physician: Karna Cabot, MD   Admitting Physician:  John Wiley DO  Admission date and time: 8/29/2020  3:07 AM    Room:  [place] [address]  Admitting diagnosis: GI bleed [K92.2]    Patient Name: Estrella Hutton  MRN: [id number]    Date of Service: 9/2/2020     Subjective:  Bonnell Apley is a 81 y.o. female who was seen and examined today,9/2/2020, at the bedside. Chiquita was evaluated in the presence of her daughter today. Chiquita has improved dramatically when compared to my examination today and is far more animated. She is now agreeable to oral ingestion and to taking her medications. Her daughter was surprised to learn that the patient was somewhat noncompliant yesterday. The patient denies any overt pain or discomfort. She is anxious for discharge. We discussed the multiple issues at play and multiple subspecialists providing consultation. We also discussed transferring from the intensive care unit. Review of System:   Constitutional:   Improving malaise. HEENT:   Denies ear pain, sore throat, sinus or eye problems. Admits to neck pain associated with her recent cervical fracture. Admits to discomfort associated with the neck brace. Cardiovascular:   Denies any chest pain, irregular heartbeats, or palpitations. Respiratory:   Denies shortness of breath, coughing, sputum production, hemoptysis, or wheezing. Gastrointestinal:   Admits to an increasing appetite with increased oral intake. Genitourinary:    Denies any urgency, frequency, hematuria. Voiding with the use of a Gonzalez catheter. Extremities:   Denies lower extremity swelling, edema or cyanosis.    Neurology:    Denies any headache or focal neurological deficits, assessed. Integumentary:  No rashes  Skin normal color and texture. Genitalia/Breast:  Voiding with use of a Gonzalez catheter. Medication:  Scheduled Meds:   potassium & sodium phosphates  1 packet Oral TID    magnesium sulfate  2 g Intravenous Once    pantoprazole  40 mg Oral QAM AC    metoprolol tartrate  25 mg Oral BID    QUEtiapine  12.5 mg Oral BID    enoxaparin  40 mg Subcutaneous Daily    sodium chloride  20 mL Intravenous Once    insulin lispro  0-12 Units Subcutaneous TID WC    insulin lispro  0-6 Units Subcutaneous Nightly    insulin glargine  20 Units Subcutaneous Nightly    heparin flush  3 mL Intravenous 2 times per day     Continuous Infusions:   lactated ringers 75 mL/hr at 09/01/20 2243    dilTIAZem (CARDIZEM) 125 mg in dextrose 5% 125 mL infusion Stopped (09/02/20 0130)    dextrose         Objective Data:  CBC with Differential:    Lab Results   Component Value Date    WBC 13.1 09/02/2020    RBC 2.57 09/02/2020    HGB 8.0 09/02/2020    HCT 24.7 09/02/2020     09/02/2020    MCV 96.1 09/02/2020    MCH 31.1 09/02/2020    MCHC 32.4 09/02/2020    RDW 16.4 09/02/2020    LYMPHOPCT 6.8 09/02/2020    MONOPCT 8.5 09/02/2020    BASOPCT 0.1 09/02/2020    MONOSABS 1.11 09/02/2020    LYMPHSABS 0.89 09/02/2020    EOSABS 0.12 09/02/2020    BASOSABS 0.01 09/02/2020     BMP:    Lab Results   Component Value Date     09/02/2020    K 3.9 09/02/2020    K 5.0 08/27/2020     09/02/2020    CO2 25 09/02/2020    BUN 23 09/02/2020    LABALBU 3.4 08/29/2020    CREATININE 1.0 09/02/2020    CALCIUM 8.7 09/02/2020    GFRAA >60 09/02/2020    LABGLOM 53 09/02/2020    GLUCOSE 77 09/02/2020       Assessment:  1. Acute upper GI bleed with hemorrhagic anemia resulting in hypovolemic shock with endoscopic findings of duodenitis with duodenal ulceration along with esophagitis and gastritis  2. Metabolic encephalopathy with improvement  3.  Acute renal failure with associated hyperkalemia and metabolic readily available for any further decision-making and intervention.      Alicia Pickens DO, F.A.C.O.I.  9/2/2020  11:27 AM

## 2020-09-02 NOTE — PROGRESS NOTES
Inpatient [facility] Cardiology Progress Note    Date of Service: 9/2/2020    Reason for Follow-up: New onset of AF     Ms. Matteo Lopez is a 81year old female who is known to Dr. Laney Hobbs. [facility]-ED on 8/29/2020 from  with complaints of melana. She was noted to be hypotensive (BP 85/59) and tachycardic (). Per ER documentation, patient appeared pale and noted to have positive hemoccult stool. He received 2 U PBCs, along with 1L fluid bolus. She was started on Protonix. BUN/Cr 109/2.2>>2.3, LA 7.3>>given 2L IV fluid boluses>>repeat 2.6--1.3., Troponin <0.01 x 2, ALbumin 3.4, glucose 275, WBC 24.8,(Prior Hgb 12.6 8/27/2020)--> H/H 10.2/30.4>>>dropped later that day to H/H 7.0/20.8 (ordered 2 U PRBCs). CT abdomen showing 3.5 cm fusiform infrarenal abdominal aneurysm and cystic lesion, GI tract reported as normal distal esophagus stomach and duodenum. GI consulted for anemia with concerns of upper GIB -- recommending scopes once stable. General surgery consulted --> for possible sepsis / concerns for possible ischemic bowel in the setting of rising LA. Renal consulted (8/29/2020) for LIVIA and increased anion gap metabolic acidosis with lactic acidosis thought to be secondary to renal hypoperfusion -- > recommending adequate IV hydration. Patient was admitted to ICU for further management. EGD (8/30/2020) showing Mucosa with LA Grade D esophagitis from 25cm down to 40cm GEJ at 40 cm. Moderate-severe gastritis with peripyloric nodularity and erythema. 2 cm hiatal hernia was present with a small 2mm bradly lesion present, observed best on retroflexion. Duodenitis and scattered erosions, and 1cm clean based ulcer in the duodenal sweep. Endo therapy with 3cc of 1: 10,000 epinephrine was injected around the ulcer with visible blanching.      Patient was noted to be in SR on admission-->converted to AF with RVR on 9/1/2020-->treated with metoprolol. Rate controlled until ~ 1700 and she went into AF with RVR.  She was started on IV Cardizem gtt and given IV digoxin. Patient converted to SR on 9/2/2020 ~ 1100. SUBJECTIVE: Denies CP, SOB, palpitations or feeling of her heart racing. Feeling tired today. OBJECTIVE: Rudy Girt in bed and appears to be in no acute distress. C-collar in place. HOME MEDICATIONS:  Prior to Admission medications    Medication Sig Start Date End Date Taking? Authorizing Provider   busPIRone (BUSPAR) 5 MG tablet Take 5 mg by mouth 2 times daily   Yes Historical Provider, MD   Magnesium Hydroxide (MILK OF MAGNESIA PO) Take 30 mLs by mouth daily as needed   Yes Historical Provider, MD   aspirin 81 MG chewable tablet Take 1 tablet by mouth daily 8/28/20  Yes NILTON Russell CNP   Heparin Sodium, Porcine, (HEPARIN, PORCINE,) 16515 UNIT/ML injection Inject 0.5 mLs into the skin every 8 hours 8/27/20  Yes NILTON Russell CNP   insulin lispro (HUMALOG) 100 UNIT/ML injection vial Inject 0-12 Units into the skin 3 times daily (with meals) 8/27/20  Yes NILTON Russell CNP   insulin lispro (HUMALOG) 100 UNIT/ML injection vial Inject 0-6 Units into the skin nightly 8/27/20  Yes NILTON Russell CNP   lidocaine 4 % external patch Place 2 patches onto the skin daily 8/28/20  Yes NILTON Russell CNP   bisacodyl (DULCOLAX) 10 MG suppository Place 1 suppository rectally daily as needed for Constipation 8/27/20 9/26/20 Yes NILTON Russell CNP   sennosides-docusate sodium (SENOKOT-S) 8.6-50 MG tablet Take 1 tablet by mouth 2 times daily 8/27/20  Yes NILTON Russell CNP   methocarbamol (ROBAXIN) 750 MG tablet Take 1 tablet by mouth 3 times daily for 10 days 8/27/20 9/6/20 Yes NILTON Russell CNP   acetaminophen (TYLENOL) 325 MG tablet Take 650 mg by mouth every 6 hours as needed for Pain   Yes Historical Provider, MD   insulin glargine (LANTUS) 100 UNIT/ML injection vial Inject 20 Units into the skin nightly.  1/20/15  Yes Aman Meraz MD   citalopram (CELEXA) 20 MG tablet Take 20 mg by mouth daily. Yes Historical Provider, MD   oxybutynin (DITROPAN) 5 MG tablet Take 5 mg by mouth nightly    Yes Historical Provider, MD   calcium carbonate (OYSTER SHELL CALCIUM 500 MG) 1250 MG tablet Take 1 tablet by mouth daily. Yes Historical Provider, MD   metFORMIN (GLUCOPHAGE) 1000 MG tablet Take 1,000 mg by mouth 2 times daily (with meals). Yes Historical Provider, MD   glimepiride (AMARYL) 4 MG tablet Take 8 mg by mouth every morning (before breakfast). Yes Historical Provider, MD   spironolactone (ALDACTONE) 50 MG tablet Take 50 mg by mouth daily.     Historical Provider, MD       CURRENT MEDICATIONS:      Current Facility-Administered Medications:     potassium & sodium phosphates (PHOS-NAK) 280-160-250 MG packet 250 mg, 1 packet, Oral, TID, Marin Joel Xie MD    magnesium sulfate 2 g in 50 mL IVPB premix, 2 g, Intravenous, Once, Rommel Ferguson MD    pantoprazole (PROTONIX) tablet 40 mg, 40 mg, Oral, [facility], Georgia Ramos DO, 40 mg at 09/02/20 7441    lactated ringers infusion, , Intravenous, Continuous, Kolby Schulz MD, Last Rate: 75 mL/hr at 09/01/20 2243    metoprolol tartrate (LOPRESSOR) tablet 25 mg, 25 mg, Oral, BID, Kolby Schulz MD, 25 mg at 09/02/20 1009    QUEtiapine (SEROQUEL) tablet 12.5 mg, 12.5 mg, Oral, BID, Kolby Schulz MD, 12.5 mg at 09/02/20 1009    enoxaparin (LOVENOX) injection 40 mg, 40 mg, Subcutaneous, Daily, Kolby Schulz MD, 40 mg at 09/02/20 1010    dilTIAZem 125 mg in dextrose 5 % 125 mL infusion, 5 mg/hr, Intravenous, Continuous, Merle Carpenter MD, Stopped at 09/02/20 0130    perflutren lipid microspheres (DEFINITY) injection 1.65 mg, 1.5 mL, Intravenous, ONCE PRN, Merle Carpenter MD    0.9 % sodium chloride bolus, 20 mL, Intravenous, Once, Jeromy Florence DO    acetaminophen (TYLENOL) tablet 650 mg, 650 mg, Oral, Q6H PRN, Gonzalez Severino DO, 650 mg at 08/29/20 1055    glucose (GLUTOSE) 40 % oral gel 15 g, 15 g, Oral, PRN, Kira Held, APRN - CNP    dextrose 50 % IV solution, 12.5 g, Intravenous, PRN, Kira Held, APRN - CNP    glucagon (rDNA) injection 1 mg, 1 mg, Intramuscular, PRN, Kira Held, APRN - CNP    dextrose 5 % solution, 100 mL/hr, Intravenous, PRN, Kira Held, APRN - CNP    insulin lispro (HUMALOG) injection vial 0-12 Units, 0-12 Units, Subcutaneous, TID WC, Kira Held, APRN - CNP, 6 Units at 08/31/20 1223    insulin lispro (HUMALOG) injection vial 0-6 Units, 0-6 Units, Subcutaneous, Nightly, Kira Held, APRN - CNP, 2 Units at 09/01/20 2008    magnesium sulfate 1 g in dextrose 5% 100 mL IVPB, 1 g, Intravenous, PRN, Kira Held, APRN - CNP, Stopped at 08/31/20 1209    sodium phosphate 10.89 mmol in dextrose 5 % 250 mL IVPB, 0.16 mmol/kg, Intravenous, PRN, Stopped at 08/31/20 1209 **OR** sodium phosphate 21.75 mmol in dextrose 5 % 500 mL IVPB, 0.32 mmol/kg, Intravenous, PRN, Kira Held, APRN - CNP    potassium chloride 10 mEq/100 mL IVPB (Peripheral Line), 10 mEq, Intravenous, PRN, Kira Held, APRN - CNP    insulin glargine (LANTUS) injection vial 20 Units, 20 Units, Subcutaneous, Nightly, Kira Held, APRN - CNP, 20 Units at 09/01/20 2008    perflutren lipid microspheres (DEFINITY) injection 1.65 mg, 1.5 mL, Intravenous, ONCE PRN, Kira Held, APRN - CNP    sodium chloride flush 0.9 % injection 10 mL, 10 mL, Intravenous, PRN, Karinaestine Dana, DO, 10 mL at 09/01/20 2006    heparin flush 100 UNIT/ML injection 300 Units, 3 mL, Intravenous, 2 times per day, Selestine Dana, DO, 300 Units at 09/02/20 1010    heparin flush 100 UNIT/ML injection 300 Units, 3 mL, Intracatheter, PRN, Selestine Dana, DO    fentaNYL (SUBLIMAZE) injection 25 mcg, 25 mcg, Intravenous, Q3H PRN, Selestine Dana, , 25 mcg at 09/01/20 2005      ALLERGIES:  Naproxen; Nsaids; and Vicodin [hydrocodone-acetaminophen]        REVIEW OF SYSTEMS:     · Constitutional: Denies fevers, chills, night sweats, and generalized fatigue. Denies significant weight loss or weight gain. · HEENT: Denies headaches, nose bleeds, rhinorrhea, sore throat. Denies blurred vision. Denies dysphagia, odynophagia. · Musculoskeletal: Denies falls, pain to BLE with ambulation. Denies muscle weakness. · Neurological: Denies dizziness and lightheadedness, numbness and tingling. Denies focal neurological deficits. · Cardiovascular: Denies chest pain, palpitations, diaphoresis. Denies dizziness, syncope. Denies PND, orthopnea, peripheral edema. · Respiratory: Denies shortness of breath at rest or with exertion. Denies cough, hemoptysis. · Gastrointestinal: Denies heartburn, nausea/vomiting, diarrhea and constipation, black/bloody, and tarry stools. PHYSICAL EXAM:   BP (!) 142/63   Pulse 67   Temp 97.9 °F (36.6 °C) (Infrared)   Resp 22   Ht 5' 6\" (1.676 m)   Wt 151 lb 8 oz (68.7 kg)   SpO2 96%   BMI 24.45 kg/m²     CONST:  Well developed, well nourished elderly female who appears of stated age. Awake, alert and cooperative. Confused at times. No apparent distress. HEENT:   Head- Normocephalic, atraumatic   Eyes- Conjunctivae pink, anicteric  Throat- Oral mucosa pink and moist  Neck-  No stridor, trachea midline, no jugular venous distention. No carotid bruit. CHEST: Chest symmetrical and tender to palpation over anterior chest. No accessory muscle use or intercostal retractions  RESPIRATORY: Lung sounds - diminished in bases. CARDIOVASCULAR:     Heart Inspection- shows no noted pulsations  Heart Palpation- no heaves or thrills; PMI is non-displaced   Heart Ausculation- RRR, 1/6 JERMAINE. No s3  or rub   PV: No lower extremity edema. No varicosities. Pedal pulses palpable, no clubbing or cyanosis   ABDOMEN: Soft, obese, non-tender to light palpation. Bowel sounds present. No palpable masses no organomegaly; no abdominal bruit  MS: Good muscle strength and tone. No atrophy or abnormal movements.    : Deferred  SKIN: 09/02/20  0554   MG 1.7 1.2*     Phos:   Recent Labs     09/01/20  0451 09/02/20  0554   PHOS 2.6 1.9*     HgA1c:   Lab Results   Component Value Date    LABA1C 5.9 (H) 08/30/2020     FASTING LIPID PANEL:  Lab Results   Component Value Date    CHOL 107 08/30/2020    HDL 18 08/30/2020    LDLCALC 36 08/30/2020    TRIG 263 08/30/2020       TTE: pending       ASSESSMENT:  1. Atrial fibrillation with RVR: Newly diagnosed, paroxysmal. Started on Cardizem IV gtt with Digoxin IV (9/1/2020). Now in SR (converted to SR ~ 1100 today on 9/2/220). Off Cardizem gtt. 2. Borderline hypotensive after conversion to SR. (BP 86/54) Hx of HTN   3. Pulmonary edema: Secondary to IV fluids: resolved. 4. CAD: S/p CABG in 2013, details are unknown  5. Hyperlipidemia  6. Abdominal aortic aneurysm  7. Status post hypovolemic shock secondary to GI bleed  8. Anemia secondary to #7  9. Hypomagnesemia: replaced per primary. RECOMMENDATIONS:  1. Recommend starting Eliquis 5 mg BID when/if okay with GI.   2. Continue Lopressor 25 mg BID (with parameters)  3. ECHO pending; will review when complete. 4. Monitor H/H: Recommend transfusion for Hgb < 8.   5. Further recommendations pending the above clinical course. 6. Will follow. The above case and recommendations to be discussed with  [facility]. Zuleika BAUM  [address] [place] [address] Cardiology    Electronically signed by NILTON Vaughn CNP on 9/2/2020 at 12:06 PM     [address] [place] [address] Cardiology progress note  Familia Bentley     I have personally participated in a face-to-face and personally obtained history and performed physical exam on the date of service. I reviewed chart, vitals, labs and radiologic studies. I also participated in medical decision making with NILTON Vaughn CNP on the date of service All of the assessments and recommendations are from me and I agree with all of the pertinent clinical information, assessment and treatment plan.  I have reviewed and edited the note above based on my findings during my history, exam, and decision making. Please see my additional contributions to the history, physical exam, assessment, and recommendations below. Patient is seen in follow-up for    Subjective:     Ms. Sam Waters feels better today  Laying in bed no apparent distress    Review of systems:  Reviewed, as above    Medication side effects: none    Scheduled Meds: Reviewed, as above  Continuous Infusions: Reviewed, as above. PRN Meds: Reviewed, as above. Objective:      Physical Exam:   /66   Pulse 69   Temp 99 °F (37.2 °C) (Axillary)   Resp 20   Ht 5' 6\" (1.676 m)   Wt 151 lb 8 oz (68.7 kg)   SpO2 95%   BMI 24.45 kg/m²   CONSTITUTIONAL:  awake, alert, cooperative, no apparent distress, and appears stated age  HEAD:  normocepalic, without obvious abnormality, atraumatic  NECK:  Supple, symmetrical, trachea midline, no adenopathy, thyroid symmetric, not enlarged and no tenderness, skin normal  LUNGS:  No increased work of breathing, good air exchange, clear to auscultation bilaterally, no crackles or wheezing  CARDIOVASCULAR:  Normal apical impulse, regular rate and rhythm, normal S1 and S2, no S3 or S4, 2/6 systolic murmur at the apex, 3/6systolic murmur at the left lower sternal border, trace edema, no JVD, no carotid bruit. ABDOMEN:  Soft, nontender, no masses, no hepatomegaly, no splenomegaly, BS+  MUSCULOSKELETAL:  No clubbing no cyanosis. there is no redness, warmth, or swelling of the joints  NEUROLOGIC:  Alert, awake  SKIN:  no bruising or bleeding, normal skin color, texture, turgor and no redness, warmth, or swelling      Cardiographics  I personally reviewed the telemetry monitor strips with the following interpretation: Sinus rhythm    Echocardiogram: Reviewed, as above. Imaging  Reviewed, as above. Reviewed, as above.     Lab Review   Lab Results   Component Value Date     09/02/2020    K 3.9 09/02/2020    K 5.0 08/27/2020    CL 105 09/02/2020    CO2 25 09/02/2020    BUN 23 09/02/2020    CREATININE 1.0 09/02/2020    GLUCOSE 77 09/02/2020    CALCIUM 8.7 09/02/2020     Lab Results   Component Value Date    WBC 13.1 09/02/2020    HGB 8.0 09/02/2020    HCT 24.7 09/02/2020    MCV 96.1 09/02/2020     09/02/2020     Reviewed, as above. I have personally reviewed the laboratory, cardiac diagnostic and radiographic testing as outlined above:    Assessment:     1. Atrial fibrillation: Newly diagnosed, paroxysmal, now in sinus rhythm, will continue current treatment. 2. Hypertension: Controlled, hypertensive at time  3. CAD: S/p CABG in 2013, details are unknown  4. Hyperlipidemia  5. Abdominal aortic aneurysm  6. Status post hypovolemic shock secondary to GI bleed          Recommendations:     1. Continue current treatment  2. Basic metabolic panel and CBC in a.m.  3.  Can be transferred out of the intensive care unit from cardiac standpoint    No family at bedside  Electronically signed by Jailyn Brown MD on 9/2/2020 at 9:32 PM  NOTE: This report was transcribed using voice recognition software.  Every effort was made to ensure accuracy; however, inadvertent computerized transcription errors may be present

## 2020-09-02 NOTE — PROGRESS NOTES
Diagnosis:   · Inadequate energy intake related to altered GI function as evidenced by intake 0-25%, poor intake prior to admission, GI abnormality      Nutrition Interventions:   Food and/or Nutrient Delivery:  Continue Current Diet, Start Oral Nutrition Supplement  Coordination of Nutrition Care:  Continued Inpatient Monitoring    Goals:  Pt to consume >50-75% most meals/ONS       Nutrition Monitoring and Evaluation:   Food/Nutrient Intake Outcomes:  Food and Nutrient Intake, Supplement Intake  Physical Signs/Symptoms Outcomes:  Biochemical Data, Chewing or Swallowing, GI Status, Fluid Status or Edema, Nutrition Focused Physical Findings, Skin, Weight     Discharge Planning:     Too soon to determine     Electronically signed by Jh Garber RD, LD on 9/2/20 at 11:55 AM EDT    Contact: [phone]

## 2020-09-03 VITALS
WEIGHT: 149.3 LBS | HEART RATE: 67 BPM | BODY MASS INDEX: 23.99 KG/M2 | RESPIRATION RATE: 18 BRPM | HEIGHT: 66 IN | DIASTOLIC BLOOD PRESSURE: 67 MMHG | TEMPERATURE: 97.5 F | OXYGEN SATURATION: 97 % | SYSTOLIC BLOOD PRESSURE: 154 MMHG

## 2020-09-03 LAB
ANION GAP SERPL CALCULATED.3IONS-SCNC: 11 MMOL/L (ref 7–16)
BASOPHILS ABSOLUTE: 0.01 E9/L (ref 0–0.2)
BASOPHILS RELATIVE PERCENT: 0.1 % (ref 0–2)
BUN BLDV-MCNC: 23 MG/DL (ref 8–23)
CALCIUM SERPL-MCNC: 8.8 MG/DL (ref 8.6–10.2)
CHLORIDE BLD-SCNC: 103 MMOL/L (ref 98–107)
CO2: 25 MMOL/L (ref 22–29)
CREAT SERPL-MCNC: 1 MG/DL (ref 0.5–1)
EKG ATRIAL RATE: 119 BPM
EKG Q-T INTERVAL: 350 MS
EKG QRS DURATION: 84 MS
EKG QTC CALCULATION (BAZETT): 486 MS
EKG R AXIS: -9 DEGREES
EKG T AXIS: 163 DEGREES
EKG VENTRICULAR RATE: 116 BPM
EOSINOPHILS ABSOLUTE: 0.28 E9/L (ref 0.05–0.5)
EOSINOPHILS RELATIVE PERCENT: 2.6 % (ref 0–6)
GFR AFRICAN AMERICAN: >60
GFR NON-AFRICAN AMERICAN: 53 ML/MIN/1.73
GLUCOSE BLD-MCNC: 73 MG/DL (ref 74–99)
HCT VFR BLD CALC: 25.4 % (ref 34–48)
HEMOGLOBIN: 7.9 G/DL (ref 11.5–15.5)
IMMATURE GRANULOCYTES #: 0.11 E9/L
IMMATURE GRANULOCYTES %: 1 % (ref 0–5)
LYMPHOCYTES ABSOLUTE: 0.9 E9/L (ref 1.5–4)
LYMPHOCYTES RELATIVE PERCENT: 8.2 % (ref 20–42)
MAGNESIUM: 1.3 MG/DL (ref 1.6–2.6)
MCH RBC QN AUTO: 30 PG (ref 26–35)
MCHC RBC AUTO-ENTMCNC: 31.1 % (ref 32–34.5)
MCV RBC AUTO: 96.6 FL (ref 80–99.9)
METER GLUCOSE: 138 MG/DL (ref 74–99)
METER GLUCOSE: 79 MG/DL (ref 74–99)
MONOCYTES ABSOLUTE: 1.04 E9/L (ref 0.1–0.95)
MONOCYTES RELATIVE PERCENT: 9.5 % (ref 2–12)
NEUTROPHILS ABSOLUTE: 8.63 E9/L (ref 1.8–7.3)
NEUTROPHILS RELATIVE PERCENT: 78.6 % (ref 43–80)
PDW BLD-RTO: 16.3 FL (ref 11.5–15)
PHOSPHORUS: 2.2 MG/DL (ref 2.5–4.5)
PLATELET # BLD: 157 E9/L (ref 130–450)
PMV BLD AUTO: 9.1 FL (ref 7–12)
POTASSIUM SERPL-SCNC: 3.5 MMOL/L (ref 3.5–5)
RBC # BLD: 2.63 E12/L (ref 3.5–5.5)
SODIUM BLD-SCNC: 139 MMOL/L (ref 132–146)
WBC # BLD: 11 E9/L (ref 4.5–11.5)

## 2020-09-03 PROCEDURE — 85025 COMPLETE CBC W/AUTO DIFF WBC: CPT

## 2020-09-03 PROCEDURE — 94669 MECHANICAL CHEST WALL OSCILL: CPT

## 2020-09-03 PROCEDURE — 6360000002 HC RX W HCPCS: Performed by: INTERNAL MEDICINE

## 2020-09-03 PROCEDURE — 2700000000 HC OXYGEN THERAPY PER DAY

## 2020-09-03 PROCEDURE — 36415 COLL VENOUS BLD VENIPUNCTURE: CPT

## 2020-09-03 PROCEDURE — 6360000002 HC RX W HCPCS: Performed by: NURSE PRACTITIONER

## 2020-09-03 PROCEDURE — 2580000003 HC RX 258: Performed by: INTERNAL MEDICINE

## 2020-09-03 PROCEDURE — 82962 GLUCOSE BLOOD TEST: CPT

## 2020-09-03 PROCEDURE — 80048 BASIC METABOLIC PNL TOTAL CA: CPT

## 2020-09-03 PROCEDURE — 36592 COLLECT BLOOD FROM PICC: CPT

## 2020-09-03 PROCEDURE — 92526 ORAL FUNCTION THERAPY: CPT

## 2020-09-03 PROCEDURE — 6370000000 HC RX 637 (ALT 250 FOR IP): Performed by: INTERNAL MEDICINE

## 2020-09-03 PROCEDURE — 83735 ASSAY OF MAGNESIUM: CPT

## 2020-09-03 PROCEDURE — 99231 SBSQ HOSP IP/OBS SF/LOW 25: CPT | Performed by: INTERNAL MEDICINE

## 2020-09-03 PROCEDURE — 84100 ASSAY OF PHOSPHORUS: CPT

## 2020-09-03 PROCEDURE — 6370000000 HC RX 637 (ALT 250 FOR IP): Performed by: HOSPITALIST

## 2020-09-03 PROCEDURE — APPSS30 APP SPLIT SHARED TIME 16-30 MINUTES: Performed by: NURSE PRACTITIONER

## 2020-09-03 PROCEDURE — 6370000000 HC RX 637 (ALT 250 FOR IP): Performed by: NURSE PRACTITIONER

## 2020-09-03 RX ORDER — PANTOPRAZOLE SODIUM 40 MG/1
40 TABLET, DELAYED RELEASE ORAL
Qty: 30 TABLET | Refills: 3 | Status: SHIPPED | OUTPATIENT
Start: 2020-09-03

## 2020-09-03 RX ORDER — MAGNESIUM SULFATE IN WATER 40 MG/ML
2 INJECTION, SOLUTION INTRAVENOUS ONCE
Status: COMPLETED | OUTPATIENT
Start: 2020-09-03 | End: 2020-09-03

## 2020-09-03 RX ORDER — LISINOPRIL 5 MG/1
5 TABLET ORAL DAILY
Status: DISCONTINUED | OUTPATIENT
Start: 2020-09-03 | End: 2020-09-03 | Stop reason: HOSPADM

## 2020-09-03 RX ADMIN — Medication 300 UNITS: at 06:23

## 2020-09-03 RX ADMIN — POTASSIUM & SODIUM PHOSPHATES POWDER PACK 280-160-250 MG 250 MG: 280-160-250 PACK at 09:04

## 2020-09-03 RX ADMIN — QUETIAPINE FUMARATE 12.5 MG: 25 TABLET ORAL at 08:45

## 2020-09-03 RX ADMIN — PANTOPRAZOLE SODIUM 40 MG: 40 TABLET, DELAYED RELEASE ORAL at 06:17

## 2020-09-03 RX ADMIN — MAGNESIUM SULFATE 2 G: 2 INJECTION INTRAVENOUS at 11:10

## 2020-09-03 RX ADMIN — POTASSIUM & SODIUM PHOSPHATES POWDER PACK 280-160-250 MG 250 MG: 280-160-250 PACK at 13:02

## 2020-09-03 RX ADMIN — Medication 300 UNITS: at 09:05

## 2020-09-03 RX ADMIN — MAGNESIUM SULFATE HEPTAHYDRATE 1 G: 1 INJECTION, SOLUTION INTRAVENOUS at 09:37

## 2020-09-03 RX ADMIN — LISINOPRIL 5 MG: 5 TABLET ORAL at 11:10

## 2020-09-03 RX ADMIN — SODIUM CHLORIDE, POTASSIUM CHLORIDE, SODIUM LACTATE AND CALCIUM CHLORIDE: 600; 310; 30; 20 INJECTION, SOLUTION INTRAVENOUS at 04:06

## 2020-09-03 RX ADMIN — ENOXAPARIN SODIUM 40 MG: 40 INJECTION SUBCUTANEOUS at 08:45

## 2020-09-03 RX ADMIN — METOPROLOL TARTRATE 25 MG: 25 TABLET, FILM COATED ORAL at 08:45

## 2020-09-03 ASSESSMENT — PAIN SCALES - GENERAL
PAINLEVEL_OUTOF10: 0
PAINLEVEL_OUTOF10: 0

## 2020-09-03 NOTE — DISCHARGE SUMMARY
BUN was disproportionately elevated to creatinine at 109/2.2. She was found to have significant leukocytosis as well with a neutrophilic predominance. Anemia was also noted which appears to be at least 2 g below her recent baseline. Hyperglycemia was again noted but below the threshold for diabetic emergency state despite the acidosis. CT of the abdomen and pelvis were performed without contrast due to the patient's renal failure and she was found to have evidence of diverticulosis without overt diverticulitis, complex right hepatic dome cyst and a 3.5 cm fusiform infrarenal abdominal aortic aneurysm. Chest x-ray obtained showed no acute process. Type and screen were obtained and the patient was subsequently admitted to intermediate level of care to the service of /.      She was recently hospitalized at [facility] after a fall that resulted in rib fractures as well as a cervical fracture. Trauma surgery and neurosurgery followed during her hospitalization there and she was recommended for a soft cervical collar while in bed in [place] collar if she was out of bed for any reason due to the cervical fracture. He is seen and examined at bedside today. We have discussed her case directly with the nurse providing care for her as well as reviewed the consultations provided by the GI and critical care consultants after the visit.  History is largely obtained by EMR review as the patient is unable to provide any meaningful information.       LABS[de-identified]  Lab Results   Component Value Date    WBC 11.0 09/03/2020    HGB 7.9 (L) 09/03/2020    HCT 25.4 (L) 09/03/2020     09/03/2020     09/03/2020    K 3.5 09/03/2020     09/03/2020    CREATININE 1.0 09/03/2020    BUN 23 09/03/2020    CO2 25 09/03/2020    GLUCOSE 73 (L) 09/03/2020    ALT 18 08/29/2020    AST 34 (H) 08/29/2020    INR 1.1 08/30/2020     Lab Results   Component Value Date    INR 1.1 08/30/2020    INR 1.0 01/17/2015 PROTIME 12.1 08/30/2020    PROTIME 11.4 01/17/2015      Lab Results   Component Value Date    TSH 6.280 (H) 05/09/2019     Lab Results   Component Value Date    TRIG 263 (H) 08/30/2020    TRIG 223 (H) 05/09/2019    TRIG 360 (H) 01/17/2015     Lab Results   Component Value Date    HDL 18 08/30/2020    HDL 37 05/09/2019    HDL 31 01/17/2015     Lab Results   Component Value Date    LDLCALC 36 08/30/2020    LDLCALC 86 05/09/2019    LDLCALC 138 (H) 01/17/2015     Lab Results   Component Value Date    LABA1C 5.9 (H) 08/30/2020       IMAGING:  Ct Abdomen Pelvis Wo Contrast Additional Contrast? None    Result Date: 8/29/2020  EXAMINATION: CT OF THE ABDOMEN AND PELVIS WITHOUT CONTRAST 8/29/2020 3:59 am TECHNIQUE: CT of the abdomen and pelvis was performed without the administration of intravenous contrast. Multiplanar reformatted images are provided for review. Dose modulation, iterative reconstruction, and/or weight based adjustment of the mA/kV was utilized to reduce the radiation dose to as low as reasonably achievable. COMPARISON: CT chest 08/23/2020, 01/16/2015 HISTORY: ORDERING SYSTEM PROVIDED HISTORY: abdominal pain TECHNOLOGIST PROVIDED HISTORY: Reason for exam:->abdominal pain Additional Contrast?->None FINDINGS: LOWER CHEST Lung bases: Dependent atelectasis. Included heart demonstrates heavy multi vessel coronary calcifications with coronary stents and postsurgical changes of CABG. Mild aortic valvular calcification. ABDOMEN Liver: No substantial change in size or appearance of heterogeneous mass at the right hepatic dome. No focal lesion on this noncontrast evaluation. Gallbladder: Normal. Biliary: No intra or extrahepatic bile duct dilatation. Pancreas: Normal. Spleen: Normal. Adrenals: Normal. Kidneys: Renal vascular calcifications at the ramos without convincing nephrolithiasis. No hydronephrosis. GI Tract: Normal distal esophagus, stomach and duodenal sweep.  No apparent bowel wall thickening or obstruction. Colonic diverticulosis. Peritoneum/Retroperitoneum: No enlarged lymph nodes, free air or free fluid. Vascular: Fusiform infrarenal abdominal aortic aneurysm measuring 3.5 cm in caliber. Heavy atherosclerotic calcifications. PELVIS Genitourinary: Normal urinary bladder. Status post hysterectomy. Other: No free fluid. No enlarged lymph nodes. MUSCULOSKELETAL Bones and Soft Tissues: No acute soft tissue or osseous abnormality. Bilateral femoral intramedullary mary and nails in place. Bones are diffusely demineralized. Lumbar spondylosis. No acute abdominopelvic findings. No substantial change in size or appearance of complex cyst at the right hepatic dome. This could be proteinaceous or hemorrhagic although multiphasic liver protocol MRI could further characterize if appropriate based on patient's life expectancy. Colonic diverticulosis without evidence of diverticulitis. 3.5 cm fusiform infrarenal abdominal aortic aneurysm. See recommendations for follow-up. RECOMMENDATIONS: For management of fusiform AAA: 3.5-3.9 cm AAA, recommend follow-up every 2 years. References: Rashad Booth Radiol 2013; 19(74):230-663; J Vasc Surg. 2018; 67:2-77     Xr Pelvis (1-2 Views)    Result Date: 8/23/2020  Single view of the pelvis. There is diffuse bone demineralization. Degenerative spondylosis and facet arthropathy are identified in the lower lumbar spine. Osteophytosis and eburnation of sacroiliac joints and hips. Catheter overlies the lower pelvis. Prior internal fixation of proximal femur fracture bilaterally. Otherwise the regional soft tissue and osseous structures are unremarkable. No acute fracture is identified. Osteopenia. Degenerative disc disease. Prior internal fixation of proximal femur fracture bilaterally. Xr Shoulder Right (min 2 Views)    Result Date: 8/23/2020  EXAMINATION: THREE XRAY VIEWS OF THE RIGHT SHOULDER 8/23/2020 9:26 am COMPARISON: None.  HISTORY: ORDERING SYSTEM PROVIDED HISTORY: fall TECHNOLOGIST PROVIDED HISTORY: Reason for exam:->fall FINDINGS: Glenohumeral joint is normally aligned. No evidence of acute fracture or dislocation. No abnormal periarticular calcifications. Moderate osteoarthrosis the AC joint. Visualized lung is unremarkable. No acute fractures or dislocations of right shoulder. Ct Head Wo Contrast    Result Date: 8/23/2020  EXAMINATION: CT OF THE HEAD WITHOUT CONTRAST  8/23/2020 9:23 am TECHNIQUE: CT of the head was performed without the administration of intravenous contrast. Dose modulation, iterative reconstruction, and/or weight based adjustment of the mA/kV was utilized to reduce the radiation dose to as low as reasonably achievable. COMPARISON: CT head January 16, 2015 HISTORY: ORDERING SYSTEM PROVIDED HISTORY: fall TECHNOLOGIST PROVIDED HISTORY: Reason for exam:->fall Has a \"code stroke\" or \"stroke alert\" been called? ->No FINDINGS: BRAIN/VENTRICLES: There is no acute intracranial hemorrhage, mass effect or midline shift. No abnormal extra-axial fluid collection. The gray-white differentiation is maintained without evidence of an acute infarct. There is no evidence of hydrocephalus. ORBITS: The visualized portion of the orbits demonstrate no acute abnormality. SINUSES: Again seen is the right sphenoid sinus inspissated mucus. The visualized paranasal sinuses and mastoid air cells demonstrate no acute abnormality. SOFT TISSUES/SKULL:  No acute abnormality of the visualized skull or soft tissues. No acute intracranial abnormality. Right sphenoid sinus is aerated mucous unchanged. Ct Chest Wo Contrast    Result Date: 8/23/2020  EXAMINATION: CT OF THE CHEST WITHOUT CONTRAST 8/23/2020 9:23 am TECHNIQUE: CT of the chest was performed without the administration of intravenous contrast. Multiplanar reformatted images are provided for review.  Dose modulation, iterative reconstruction, and/or weight based adjustment of the mA/kV was utilized to reduce the radiation dose to as low as reasonably achievable. COMPARISON: CT chest performed January 16, 2015 HISTORY: ORDERING SYSTEM PROVIDED HISTORY: Fall TECHNOLOGIST PROVIDED HISTORY: Reason for exam:->Fall FINDINGS: Mediastinum: Multiple grossly stable thyroid gland nodules, not significantly changed from prior examination. Follow-up as clinically indicated. Hypodensity along the posterior left lateral aspect of the cervical esophagus measuring up to 2.2 cm. No significant mediastinal or hilar lymphadenopathy. Multivessel coronary artery disease. Grossly stable dilation of the ascending thoracic aorta measuring up to 4.4 cm in diameter, previously 4.3 cm. Diffuse atherosclerotic disease of the visualized aorta. Lungs/pleura: New spiculated nodule within the right upper lobe (axial image 20) measures 1.1 x 0.8 cm. Bibasilar atelectasis. No pleural effusion or pneumothorax. The central airways are grossly patent. Upper Abdomen: Large cyst centered within the right hepatic lobe now demonstrates some dependently layering hyperdensity which could represent proteinaceous versus hemorrhagic content. The adrenal glands are normal. Soft Tissues/Bones: Diffuse osteopenia. Multilevel degenerative changes of the visualized spine. Mildly displaced fractures of the right lateral 3rd, 4th, and 5th ribs. Mildly displaced fracture of the posterolateral left 9th rib. .     1. Mildly displaced right 3rd-5th rib and left 9th rib fractures. 2. The spiculated nodule in the right upper lobe measuring up to 1.1 cm. Recommendations described below. 3. Multivessel coronary artery disease. Stable dilation of the ascending thoracic aorta measuring up to 4.4 cm, previously 4.3 cm. 4. Hypodensity posterior to the cervical esophagus measuring up to 2.2 cm. Finding could represent a Zenker's diverticulum.  5. Large cyst centered within the right hepatic lobe now demonstrates layering hyperdensity which could be related to proteinaceous versus planes. Low-dose CT  acquisition technique included one of following options; 1 . Automated exposure control, 2. Adjustment of MA and or KV according to patient's size or 3. Use of iterative reconstruction. Findings: Thoracic vertebrae show diffuse demineralization, and flowing ventral osteophytes suggests a spectrum of DISH-type change. There is a kyphotic curvature with the apex at about the T7 level. The central spinal canal and foramina appear patent, and the posterior elements appear intact throughout. Coronal images show no lateral deviation of the thoracic spine. Axial images show atherosclerotic vertebral arterial calcifications, but the medial ribs and thoracic vertebrae appear intact throughout. Standard CT imaging included more the anterior and lateral chest wall, and reported fractures of the right third through fifth and left ninth ribs. There is ectasia and intimal calcification of the thoracic aorta, and coronary artery calcifications are present. Lung window images show a posteromedial right pulmonary apical irregularly marginated nodular density of 13 mm diameter which is not further characterized on this study. It could be evaluated by PET CT imaging, short-term interval 3 month chest CT follow-up, or less desirable in this age range, percutaneous biopsy. There are small pleural effusions with basilar atelectasis. There is no evidence of chest wall trauma, particularly, no evidence of trauma involving the thoracic spine. Bilateral rib fractures documented on standard CT imaging were not included in the spinal images. There is a 13 mm diameter mass in the posterior medial right pulmonary apical region, which could be further characterized with PET CT imaging if it has not been previously evaluated. 3 month CT chest follow-up evaluation may document stability in this patient of advanced age, it PET CT imaging is not feasible. Dependent atelectasis and pleural thickening in both lungs is noted. Some images are degraded by motion. The axial T1-weighted images are nondiagnostic. There is exaggerated cervical lordosis. There is multilevel degenerative disc disease, worst at C5-C6 and C6-C7. There is a trace of anterolisthesis of C6 on C7. Endplate spurring is noted at C6 and C7. There is mild degenerative compression of the C5 and C6 vertebral bodies. Evaluation of the spinal canal is limited by lack of properly angled axial images; the axial images are obliquely oriented to the axis of the spine. There appears to be mild spinal canal stenosis at C5-C6 without impingement of the cord. Marrow edema is noted in the C4 vertebral body. An oblique line of hypointensity is noted through the anterior aspect of the superior C4 endplate on sagittal [id number] images, suspicious for fracture. There is edema in the lateral mass of C2. Study limited by motion and lack of properly angled axial images. Edema in the lateral mass of the C2 vertebral body, compatible with fracture. Findings compatible with C4 superior endplate fracture. Associated marrow edema in the C4 vertebral body. Trace of anterolisthesis of C6 on C7. Multilevel degenerative disc disease, worst at C5-C6 and C6-C7, without significant-appearing spinal canal stenosis. ALERT:  THIS IS AN ABNORMAL REPORT     Xr Chest Portable    Result Date: 8/30/2020  EXAMINATION: ONE XRAY VIEW OF THE CHEST 8/29/2020 7:58 am COMPARISON: 08/23/2020 HISTORY: ORDERING SYSTEM PROVIDED HISTORY: chest pain TECHNOLOGIST PROVIDED HISTORY: Reason for exam:->chest pain FINDINGS: The patient is rotated. The patient is status post sternotomy. The heart size is within normal limits. There is no pneumothorax, edema or focal consolidation. An acute process is not appreciated.  Rotated exam.     Xr Chest Portable    Result Date: 8/29/2020  EXAMINATION: ONE XRAY VIEW OF THE CHEST 8/29/2020 6:37 pm COMPARISON: 08/29/2020 HISTORY: ORDERING SYSTEM PROVIDED HISTORY: central line placement TECHNOLOGIST PROVIDED HISTORY: Reason for exam:->central line placement FINDINGS: Right internal jugular central line with tip at the cavoatrial junction. Median sternotomy wires are noted. Cardiomediastinal silhouette and bony thorax are unchanged. No evidence of pneumothorax. Small right pleural effusion with right basilar atelectasis is noted. Central line with tip at the cavoatrial junction. Small right pleural effusion. No pneumothorax. Xr Chest Portable    Result Date: 8/23/2020  Clinical indications: Rib fractures. TECHNIQUE: Single frontal projection of the chest (1 view). COMPARISON: January 16, 2015. FINDINGS: Prior median sternotomy wires. There is calcification within thoracic aorta. Acromioclavicular arthropathy. The heart, lungs, mediastinum and regional skeleton are otherwise unremarkable. No evidence for acute cardiopulmonary process. HOSPITAL COURSE:   Chiquita improved dramatically throughout the course of hospitalization. She was initially admitted August 29 with acute upper GI bleeding with hypovolemic shock. GI was consulted for the recommendations and she was placed on PPI infusion. She was identified as having duodenal bulb ulceration which was treated with epinephrine as well as duodenitis, esophagitis and gastritis. Her blood thinner was held and recommendations per the GI team are for continued holding for 7-10 days from 8/30 endoscopy. After treatment, blood counts stabilized. Underlying metabolic encephalopathy improved with treatment administered as well as the acute kidney injury. Nephrotoxic medications were withheld and will be selectively resumed. Ultimately it was determined that she would require LTAC placement going forward for continued care and treatment at [facility].   Previous hospitalization she was identified as having a cervical fracture and is required per neurosurgery recommendation to have soft collar on while in tablet  Take 650 mg by mouth every 6 hours as needed for Pain             busPIRone (BUSPAR) 5 MG tablet  Take 5 mg by mouth 2 times daily             citalopram (CELEXA) 20 MG tablet  Take 20 mg by mouth daily. Heparin Sodium, Porcine, (HEPARIN, PORCINE,) 09523 UNIT/ML injection  Inject 0.5 mLs into the skin every 8 hours             insulin glargine (LANTUS) 100 UNIT/ML injection vial  Inject 20 Units into the skin nightly. insulin lispro (HUMALOG) 100 UNIT/ML injection vial  Inject 0-12 Units into the skin 3 times daily (with meals)             insulin lispro (HUMALOG) 100 UNIT/ML injection vial  Inject 0-6 Units into the skin nightly             metoprolol tartrate (LOPRESSOR) 25 MG tablet  Take 1 tablet by mouth 2 times daily             pantoprazole (PROTONIX) 40 MG tablet  Take 1 tablet by mouth 2 times daily (before meals)             sennosides-docusate sodium (SENOKOT-S) 8.6-50 MG tablet  Take 1 tablet by mouth 2 times daily                 FOLLOW UP/INSTRUCTIONS:  · This patient is instructed to follow-up with her primary care physician. · Patient is instructed to follow-up with the consults listed above as directed by them. · she is instructed to resume home medications and take new medications as indicated in the list above. · If the patient has a recurrence of symptoms, she is instructed to go to the ED. Preparing for this patient's discharge, including paperwork, orders, instructions, and meeting with patient did require > 40 minutes. NILTON Leyva CNP   9/3/2020  10:50 AM     Chiquita did very well throughout the hospitalization. She underwent endoscopic evaluation with results as listed above. She required cardiac medication adjustments in the setting of atrial fibrillation with intermittent periods of rapid ventricular response.   Ultimate plans will be for resumption of anticoagulation therapy when deemed acceptable by the GI team.  Hemoglobin stabilized otherwise. Renal failure also resolved. She was placed on a diet and her diabetic medications were resumed. She requires extensive work with the therapy teams. This will be better accomplished at an LTAC facility and this is been arranged accordingly. I saw, examined, and discussed the patient with Rena BAUM and agree with his assessment and plan.     Electronically signed by Ryan Blackwell DO on 9/3/2020 at 11:02 AM

## 2020-09-03 NOTE — PLAN OF CARE
Problem: Falls - Risk of:  Goal: Will remain free from falls  Description: Will remain free from falls  Outcome: Met This Shift  Goal: Absence of physical injury  Description: Absence of physical injury  Outcome: Met This Shift     Problem: Skin Integrity:  Goal: Will show no infection signs and symptoms  Description: Will show no infection signs and symptoms  Outcome: Met This Shift  Goal: Absence of new skin breakdown  Description: Absence of new skin breakdown  Outcome: Met This Shift     Problem: Fluid Volume - Imbalance:  Goal: Absence of imbalanced fluid volume signs and symptoms  Description: Absence of imbalanced fluid volume signs and symptoms  Outcome: Met This Shift  Goal: Will show no signs and symptoms of excessive bleeding  Description: Will show no signs and symptoms of excessive bleeding  Outcome: Met This Shift     Problem: Pain:  Goal: Pain level will decrease  Description: Pain level will decrease  Outcome: Met This Shift  Goal: Control of acute pain  Description: Control of acute pain  Outcome: Met This Shift  Goal: Control of chronic pain  Description: Control of chronic pain  Outcome: Met This Shift

## 2020-09-03 NOTE — PROGRESS NOTES
Inpatient [facility] Cardiology Progress Note    Date of Service: 9/3/2020    Reason for Follow-up: New onset of AF     Ms. Zane Ashraf is a 81year old female who is known to Dr. Tracy Nevarez. [facility]-ED on 8/29/2020 from  with complaints of melana. She was noted to be hypotensive (BP 85/59) and tachycardic (). Per ER documentation, patient appeared pale and noted to have positive hemoccult stool. He received 2 U PBCs, along with 1L fluid bolus. She was started on Protonix. BUN/Cr 109/2.2>>2.3, LA 7.3>>given 2L IV fluid boluses>>repeat 2.6--1.3., Troponin <0.01 x 2, ALbumin 3.4, glucose 275, WBC 24.8,(Prior Hgb 12.6 8/27/2020)--> H/H 10.2/30.4>>>dropped later that day to H/H 7.0/20.8 (ordered 2 U PRBCs). CT abdomen showing 3.5 cm fusiform infrarenal abdominal aneurysm and cystic lesion, GI tract reported as normal distal esophagus stomach and duodenum. GI consulted for anemia with concerns of upper GIB -- recommending scopes once stable. General surgery consulted --> for possible sepsis / concerns for possible ischemic bowel in the setting of rising LA. Renal consulted (8/29/2020) for LIVIA and increased anion gap metabolic acidosis with lactic acidosis thought to be secondary to renal hypoperfusion -- > recommending adequate IV hydration. Patient was admitted to ICU for further management. EGD (8/30/2020) showing Mucosa with LA Grade D esophagitis from 25cm down to 40cm GEJ at 40 cm. Moderate-severe gastritis with peripyloric nodularity and erythema. 2 cm hiatal hernia was present with a small 2mm bradly lesion present, observed best on retroflexion. Duodenitis and scattered erosions, and 1cm clean based ulcer in the duodenal sweep. Endo therapy with 3cc of 1: 10,000 epinephrine was injected around the ulcer with visible blanching.      Patient was noted to be in SR on admission-->converted to AF with RVR on 9/1/2020-->treated with metoprolol. Rate controlled until ~ 1700 and she went into AF with RVR.  She was started on IV Cardizem gtt and given IV digoxin. 9/2/2020: transferred out of ICU. Patient converted to SR on 9/2/2020 ~ 1100.    9/3/2020: SR on monitor. SUBJECTIVE: Denies CP, SOB, palpitations or feeling of her heart racing. OBJECTIVE: Sitting up in bed and appears to be in no acute distress. C-collar in place. Patient wants to sit in chair / requesting PT.       HOME MEDICATIONS:  Prior to Admission medications    Medication Sig Start Date End Date Taking?  Authorizing Provider   busPIRone (BUSPAR) 5 MG tablet Take 5 mg by mouth 2 times daily   Yes Historical Provider, MD   Magnesium Hydroxide (MILK OF MAGNESIA PO) Take 30 mLs by mouth daily as needed   Yes Historical Provider, MD   aspirin 81 MG chewable tablet Take 1 tablet by mouth daily 8/28/20  Yes NILTON Thomas CNP   Heparin Sodium, Porcine, (HEPARIN, PORCINE,) 61113 UNIT/ML injection Inject 0.5 mLs into the skin every 8 hours 8/27/20  Yes NILTON Thomas CNP   insulin lispro (HUMALOG) 100 UNIT/ML injection vial Inject 0-12 Units into the skin 3 times daily (with meals) 8/27/20  Yes NILTON Thomas CNP   insulin lispro (HUMALOG) 100 UNIT/ML injection vial Inject 0-6 Units into the skin nightly 8/27/20  Yes NILTON Thomas CNP   lidocaine 4 % external patch Place 2 patches onto the skin daily 8/28/20  Yes NILTON Thomas CNP   bisacodyl (DULCOLAX) 10 MG suppository Place 1 suppository rectally daily as needed for Constipation 8/27/20 9/26/20 Yes NILTON Thomas CNP   sennosides-docusate sodium (SENOKOT-S) 8.6-50 MG tablet Take 1 tablet by mouth 2 times daily 8/27/20  Yes NILTON Thomas CNP   methocarbamol (ROBAXIN) 750 MG tablet Take 1 tablet by mouth 3 times daily for 10 days 8/27/20 9/6/20 Yes NILTON Thomas CNP   acetaminophen (TYLENOL) 325 MG tablet Take 650 mg by mouth every 6 hours as needed for Pain   Yes Historical Provider, MD   insulin glargine (LANTUS) 100 UNIT/ML injection vial Inject 20 Units into the skin nightly. 1/20/15  Yes Mercy Reid MD   citalopram (CELEXA) 20 MG tablet Take 20 mg by mouth daily. Yes Historical Provider, MD   oxybutynin (DITROPAN) 5 MG tablet Take 5 mg by mouth nightly    Yes Historical Provider, MD   calcium carbonate (OYSTER SHELL CALCIUM 500 MG) 1250 MG tablet Take 1 tablet by mouth daily. Yes Historical Provider, MD   metFORMIN (GLUCOPHAGE) 1000 MG tablet Take 1,000 mg by mouth 2 times daily (with meals). Yes Historical Provider, MD   glimepiride (AMARYL) 4 MG tablet Take 8 mg by mouth every morning (before breakfast). Yes Historical Provider, MD   spironolactone (ALDACTONE) 50 MG tablet Take 50 mg by mouth daily.     Historical Provider, MD       CURRENT MEDICATIONS:      Current Facility-Administered Medications:     potassium & sodium phosphates (PHOS-NAK) 280-160-250 MG packet 250 mg, 1 packet, Oral, TID, Iwona Nelson MD, 250 mg at 09/03/20 0904    pantoprazole (PROTONIX) tablet 40 mg, 40 mg, Oral, BID AC, Marcela Scruggs MD, 40 mg at 09/03/20 0617    lactated ringers infusion, , Intravenous, Continuous, Jose Medeiros MD, Last Rate: 75 mL/hr at 09/03/20 0406    metoprolol tartrate (LOPRESSOR) tablet 25 mg, 25 mg, Oral, BID, Jose Medeiros MD, 25 mg at 09/03/20 0845    QUEtiapine (SEROQUEL) tablet 12.5 mg, 12.5 mg, Oral, BID, Jose Medeiros MD, 12.5 mg at 09/03/20 0845    enoxaparin (LOVENOX) injection 40 mg, 40 mg, Subcutaneous, Daily, Jose Medeiros MD, 40 mg at 09/03/20 0845    dilTIAZem 125 mg in dextrose 5 % 125 mL infusion, 5 mg/hr, Intravenous, Continuous, Agatha Mcmahon MD, Stopped at 09/02/20 0130    perflutren lipid microspheres (DEFINITY) injection 1.65 mg, 1.5 mL, Intravenous, ONCE PRN, Agatha Mcmahon MD    0.9 % sodium chloride bolus, 20 mL, Intravenous, Once, Aquiles Chandra,     acetaminophen (TYLENOL) tablet 650 mg, 650 mg, Oral, Q6H PRN, Jelena Severino DO, 650 mg at 08/29/20 1055    glucose (GLUTOSE) 40 % oral gel 15 g, 15 g, Oral, PRN, Shara Idania, APRN - CNP    dextrose 50 % IV solution, 12.5 g, Intravenous, PRN, Shara Idania, APRN - CNP    glucagon (rDNA) injection 1 mg, 1 mg, Intramuscular, PRN, Shara Mocourtney, APRN - CNP    dextrose 5 % solution, 100 mL/hr, Intravenous, PRN, Shara Idania, APRN - CNP    insulin lispro (HUMALOG) injection vial 0-12 Units, 0-12 Units, Subcutaneous, TID WC, Shara Mocourtney, APRN - CNP, 6 Units at 08/31/20 1223    insulin lispro (HUMALOG) injection vial 0-6 Units, 0-6 Units, Subcutaneous, Nightly, Shara Idania APRN - CNP, 1 Units at 09/02/20 2008    magnesium sulfate 1 g in dextrose 5% 100 mL IVPB, 1 g, Intravenous, PRN, Shara Idania, APRN - CNP, Last Rate: 100 mL/hr at 09/03/20 0937, 1 g at 09/03/20 0937    sodium phosphate 10.89 mmol in dextrose 5 % 250 mL IVPB, 0.16 mmol/kg, Intravenous, PRN, Stopped at 08/31/20 1209 **OR** sodium phosphate 21.75 mmol in dextrose 5 % 500 mL IVPB, 0.32 mmol/kg, Intravenous, PRN, Shara Mocourtney, APRN - CNP    potassium chloride 10 mEq/100 mL IVPB (Peripheral Line), 10 mEq, Intravenous, PRN, Shara Idania, APRN - CNP    insulin glargine (LANTUS) injection vial 20 Units, 20 Units, Subcutaneous, Nightly, Shara Mocourtney, APRN - CNP, 20 Units at 09/02/20 2008    perflutren lipid microspheres (DEFINITY) injection 1.65 mg, 1.5 mL, Intravenous, ONCE PRN, Shara Idania, APRN - CNP    sodium chloride flush 0.9 % injection 10 mL, 10 mL, Intravenous, PRN, Ivelisse Distad, DO, 10 mL at 09/02/20 2004    heparin flush 100 UNIT/ML injection 300 Units, 3 mL, Intravenous, 2 times per day, Ivelisse Distad, DO, 300 Units at 09/03/20 0905    heparin flush 100 UNIT/ML injection 300 Units, 3 mL, Intracatheter, PRN, Ivelisse Distad, DO, 300 Units at 09/03/20 0623    fentaNYL (SUBLIMAZE) injection 25 mcg, 25 mcg, Intravenous, Q3H PRN, Ivelisse Distad, DO, 25 mcg at 09/02/20 2002      ALLERGIES:  Naproxen; Nsaids; and Vicodin [hydrocodone-acetaminophen]        REVIEW OF SYSTEMS:     · Constitutional: Denies fevers, chills, night sweats, and generalized fatigue. Denies significant weight loss or weight gain. · HEENT: Denies headaches, nose bleeds, rhinorrhea, sore throat. Denies blurred vision. Denies dysphagia, odynophagia. · Musculoskeletal: Denies falls, pain to BLE with ambulation. Denies muscle weakness. · Neurological: Denies dizziness and lightheadedness, numbness and tingling. Denies focal neurological deficits. · Cardiovascular: Denies chest pain, palpitations, diaphoresis. Denies dizziness, syncope. Denies PND, orthopnea, peripheral edema. · Respiratory: Denies shortness of breath at rest or with exertion. Denies cough, hemoptysis. · Gastrointestinal: Denies heartburn, nausea/vomiting, diarrhea and constipation, black/bloody, and tarry stools. PHYSICAL EXAM:   BP (!) 154/67   Pulse 67   Temp 97.5 °F (36.4 °C) (Oral)   Resp 18   Ht 5' 6\" (1.676 m)   Wt 149 lb 4.8 oz (67.7 kg)   SpO2 97%   BMI 24.10 kg/m²     CONST:  Well developed, well nourished elderly female who appears of stated age. Awake, alert and cooperative. Confused at times. No apparent distress. HEENT:   Head- Normocephalic, atraumatic   Eyes- Conjunctivae pink, anicteric  Throat- Oral mucosa pink and moist  Neck-  No stridor, trachea midline, no jugular venous distention. No carotid bruit. CHEST: Chest symmetrical and tender to palpation over anterior chest. No accessory muscle use or intercostal retractions  RESPIRATORY: Lung sounds - diminished in bases. CARDIOVASCULAR:     Heart Inspection- shows no noted pulsations  Heart Palpation- no heaves or thrills; PMI is non-displaced   Heart Ausculation- RRR, 1/6 JERMAINE. No s3  or rub   PV: No lower extremity edema. No varicosities. Pedal pulses palpable, no clubbing or cyanosis   ABDOMEN: Soft, obese, non-tender to light palpation. Bowel sounds present.  No palpable masses no organomegaly; no abdominal bruit  MS: Good muscle strength and tone. No atrophy or abnormal movements. : Deferred  SKIN: Warm and dry no statis dermatitis or ulcers   NEURO / PSYCH: Oriented to person, place and time. Speech clear and appropriate. Follows all commands. Pleasant affect        DATA:    Telemetry: SB (HR 50's). No arrhythmias overnight. Diagnostic:    CXR: 8/29/2020  Central line with tip at the cavoatrial junction.         Small right pleural effusion.  No pneumothorax.       CT Abdomen/Pelvis: 8/29/2020  No acute abdominopelvic findings.         No substantial change in size or appearance of complex cyst at the right    hepatic dome.  This could be proteinaceous or hemorrhagic although    multiphasic liver protocol MRI could further characterize if appropriate    based on patient's life expectancy.         Colonic diverticulosis without evidence of diverticulitis.         3.5 cm fusiform infrarenal abdominal aortic aneurysm.  See recommendations    for follow-up.         RECOMMENDATIONS:    For management of fusiform AAA:         3.5-3.9 cm AAA, recommend follow-up every 2 years.       MRI Cervical Spine: 8/26/2020  Study limited by motion and lack of properly angled axial images. Edema in the lateral mass of the C2 vertebral body, compatible with    fracture. Findings compatible with C4 superior endplate fracture. Associated    marrow edema in the C4 vertebral body. Trace of anterolisthesis of C6 on C7.     Multilevel degenerative disc disease, worst at C5-C6 and C6-C7,    without significant-appearing spinal canal stenosis.             Intake/Output Summary (Last 24 hours) at 9/3/2020 1037  Last data filed at 9/3/2020 0553  Gross per 24 hour   Intake 1712.88 ml   Output 950 ml   Net 762.88 ml       Labs:   CBC:   Recent Labs     09/02/20  0554 09/03/20  0618   WBC 13.1* 11.0   HGB 8.0* 7.9*   HCT 24.7* 25.4*    157     BMP:   Recent Labs     09/02/20  0554 09/03/20  0618    139   K 3.9 radiologic studies. I also participated in medical decision making with NILTON Justice CNP on the date of service All of the assessments and recommendations are from me and I agree with all of the pertinent clinical information, assessment and treatment plan. I have reviewed and edited the note above based on my findings during my history, exam, and decision making. Please see my additional contributions to the history, physical exam, assessment, and recommendations below. Patient is seen in follow-up for atrial fibrillation    Subjective:     Ms. Zane Ashraf wants to go rehab  Laying in bed no apparent distress    Review of systems:  Reviewed, as above    Medication side effects: none    Scheduled Meds: Reviewed, as above  Continuous Infusions: Reviewed, as above. PRN Meds: Reviewed, as above. Objective:      Physical Exam:   BP (!) 154/67   Pulse 67   Temp 97.5 °F (36.4 °C) (Oral)   Resp 18   Ht 5' 6\" (1.676 m)   Wt 149 lb 4.8 oz (67.7 kg)   SpO2 97%   BMI 24.10 kg/m²   CONSTITUTIONAL:  awake, alert, cooperative, no apparent distress, and appears stated age  HEAD:  normocepalic, without obvious abnormality, atraumatic  NECK:  Supple, symmetrical, trachea midline, no adenopathy, thyroid symmetric, not enlarged and no tenderness, skin normal  LUNGS:  No increased work of breathing, good air exchange, clear to auscultation bilaterally, no crackles or wheezing  CARDIOVASCULAR:  Normal apical impulse, regular rate and rhythm, normal S1 and S2, no S3 or S4, 2/6 systolic murmur at the apex, 3/6systolic murmur at the left lower sternal border, trace edema, no JVD, no carotid bruit. ABDOMEN:  Soft, nontender, no masses, no hepatomegaly, no splenomegaly, BS+  MUSCULOSKELETAL:  No clubbing no cyanosis. there is no redness, warmth, or swelling of the joints  NEUROLOGIC:  Alert, awake  SKIN:  no bruising or bleeding, normal skin color, texture, turgor and no redness, warmth, or

## 2020-09-03 NOTE — CARE COORDINATION
8/31/2020  No covid testing. CM transition of care:  SS consult for LTACH. Choiced daughter Vahid Meier- requested [facility]. Spoke to [employer]- he will start the precert with Tish. Alternate discharge [facility] (n) return. Pt is a bedhold, Will require SNF precert and olayinka prior to discharge. Waiting to hear from [facility].  Electronically signed by Theresa Denise RN on 8/31/2020 at 3:21 PM
9/2/2020 No covid testing. Cm transition of care:  Pt is a transfer to intermediate care unit. LTACH precert underway for that loc. Plans for [facility] if approved. Alternate discharge plan is [facility] (n). Pt does still have her belongings at the assisted living facility [address] in [place], [facility]. CM/SS to continue to follow. Electronically signed by OCTAVIA Solano on 9/2/2020 at 1:35 PM       Addendum:  Call from [address] [place] [address]- approval obtained from [address] [place] [address]- only good for one day. Per pcp plans discharge tomorrow Thursday or Friday. Flavio with [facility] aware. CM to follow.  Electronically signed by OCTAVIA Solano on 9/2/2020 at 2:36 PM
9/3/2020 No Covid testing- None needed unless patient is having Covid-19 symptoms. CM transition of care: ABBY ABHINAV received approval- extended still approved today 9/3. Watching for discharge. Ambulance sheet in soft blue chart. No discharge noted at time of this review. Spoke to family Moiz Burgos and she is excited that [address] will accept and is aware it is the [employer]  location. Moiz Burgos [phone] will need updated when known discharge is to occur. CM/SS following.  Electronically signed by Kami Hoffman RN-BC on 9/3/2020 at 10:24 AM
9/3/2020 daughter Johnson Card has been updated with pts discharge planned for today to [facility]. She is in agreement.  Electronically signed by OCTAVIA Klein on 9/3/2020 at 11:24 AM
SOCIAL WORK / DISCHARGE PLANNING:  COVID testing not done. Pt to discharge to [facility] [phone] fax [phone]. Room 725. Transport arranged via [facility] 330pm. [address] [place] [address] RN charge aware. Sw notified dtr, Aleksandrsherrill Pires [phone] via phone of dc and time of transport, request text [facility] contact info.                  Electronically signed by CHIKA Dye on 9/3/2020 at 12:17 PM
SOCIAL WORK / DISCHARGE PLANNING:  Discharge order noted for [facility] [phone] fax [phone]. PRECERT extend thru today. Per Flavio, [facility] rep will call this Sw with room and time around 1230pm. Transport will be arranged via ambulance when confirmed. Electronic YULI in pt's EPIC Chart for physician signature.                   Electronically signed by CHIKA Thomas on 9/3/2020 at 11:17 AM
recommended)  Electronically signed by OCTAVIA Eason on 8/31/2020 at 10:53 AM

## 2020-09-03 NOTE — PLAN OF CARE
Problem: Falls - Risk of:  Goal: Will remain free from falls  Description: Will remain free from falls  9/3/2020 0950 by Dary Farias RN  Outcome: Met This Shift  9/3/2020 0452 by Malia Johnson  Outcome: Met This Shift     Problem: Falls - Risk of:  Goal: Absence of physical injury  Description: Absence of physical injury  9/3/2020 0950 by Dary Farias RN  Outcome: Met This Shift  9/3/2020 0452 by Malia Johnson  Outcome: Met This Shift

## 2020-09-03 NOTE — PROGRESS NOTES
Speech Language Pathology  SPEECH LANGUAGE PATHOLOGY  DAILY PROGRESS NOTE        PATIENT NAME:  Janiya Boudreaux      :  1935          TODAY'S DATE:  9/3/2020 ROOM:  [facility]    Pt seen in f/u for dysphagia management, resting in bed with soft collar on, in no acute distress. She politely declined all PO trials of soft solids to attempt diet advancement however, when clinician attempted to engage pt in dysphagia therapy exercises to strengthen laryngeal and pharyngeal musculature she would reply, \"Why would I do that? \" or \"not right now\" therefore overall outcome of session was limited d/t pt cooperation. Per staff pt is tolerating the least restrictive diet of puree diet with nectar mildly thick liquids with limited PO intake but without overt clinical indicators aspiration. Will cont as per POC to monitor tolerance for prescribed diet and advance diet as clinically indicated. Mic Fraser M.Ed. CCC-SLP  SP [id number]     CPT code(s) 50051  dysphagia tx   Total minutes :  15 minutes

## 2020-09-03 NOTE — PROGRESS NOTES
Nephrology Note  Philippe Rucker MD          Patient seen and examined. No family member is present at bedside. Chart reviewed. Patient is feeling better. Denies shortness of breath. Appetite good. No nausea or dysguesia. Awake and alert . In no acute distress. Vital SignsBP (!) 154/67   Pulse 67   Temp 97.5 °F (36.4 °C) (Oral)   Resp 18   Ht 5' 6\" (1.676 m)   Wt 149 lb 4.8 oz (67.7 kg)   SpO2 97%   BMI 24.10 kg/m²   24HR INTAKE/OUTPUT:    Intake/Output Summary (Last 24 hours) at 9/3/2020 1309  Last data filed at 9/3/2020 0553  Gross per 24 hour   Intake 1712.88 ml   Output 950 ml   Net 762.88 ml         Physical Exam    HEENT: Dry oral mucosa  Neck: No JVD  Lungs: Breath sounds decreased at the bases. No rales or ronchi. Heart: Regular rate and rhythm. No S3 gallop.  Grade 2/6 systolic murmur along the left sternal border  Abdomen: Soft non distended, non tender and normal bowel sounds.   Extremeties: No edema.        ROS:  RESPIRATORY:  negative  CARDIOVASCULAR:  negative  GASTROINTESTINAL: Negative.       Current Facility-Administered Medications   Medication Dose Route Frequency Provider Last Rate Last Dose    lisinopril (PRINIVIL;ZESTRIL) tablet 5 mg  5 mg Oral Daily NILTON Vazquez - CNP   5 mg at 09/03/20 1110    potassium & sodium phosphates (PHOS-NAK) 280-160-250 MG packet 250 mg  1 packet Oral TID Iwona Nelson MD   250 mg at 09/03/20 1302    pantoprazole (PROTONIX) tablet 40 mg  40 mg Oral BID JOSAFAT Scruggs MD   40 mg at 09/03/20 0617    lactated ringers infusion   Intravenous Continuous Nanette Escobar MD 75 mL/hr at 09/03/20 0406      metoprolol tartrate (LOPRESSOR) tablet 25 mg  25 mg Oral BID Jose Medeiros MD   25 mg at 09/03/20 0845    QUEtiapine (SEROQUEL) tablet 12.5 mg  12.5 mg Oral BID Nanette Escobar MD   12.5 mg at 09/03/20 0845    enoxaparin (LOVENOX) injection 40 mg  40 mg Subcutaneous Daily Randy Rivas Sg Redman MD   40 mg at 09/03/20 0845    dilTIAZem 125 mg in dextrose 5 % 125 mL infusion  5 mg/hr Intravenous Continuous Chel Hinojosa MD   Stopped at 09/02/20 0130    perflutren lipid microspheres (DEFINITY) injection 1.65 mg  1.5 mL Intravenous ONCE PRN Chel Hinojosa MD        0.9 % sodium chloride bolus  20 mL Intravenous Once Quinton Kelli,         acetaminophen (TYLENOL) tablet 650 mg  650 mg Oral Q6H PRN Gonzalez Severino DO   650 mg at 08/29/20 1055    glucose (GLUTOSE) 40 % oral gel 15 g  15 g Oral PRN NILTON Camarillo - CNP        dextrose 50 % IV solution  12.5 g Intravenous PRN NILTON Camarillo - CNP        glucagon (rDNA) injection 1 mg  1 mg Intramuscular PRN NILTON Camarillo - CNP        dextrose 5 % solution  100 mL/hr Intravenous PRN NILTON Camarillo - CNP        insulin lispro (HUMALOG) injection vial 0-12 Units  0-12 Units Subcutaneous TID WC Loni Niño APRN - CNP   6 Units at 08/31/20 1223    insulin lispro (HUMALOG) injection vial 0-6 Units  0-6 Units Subcutaneous Nightly NILTON Camarillo - CNP   1 Units at 09/02/20 2008    magnesium sulfate 1 g in dextrose 5% 100 mL IVPB  1 g Intravenous PRN Loni Niño APRN - CNP   Stopped at 09/03/20 1056    sodium phosphate 10.89 mmol in dextrose 5 % 250 mL IVPB  0.16 mmol/kg Intravenous PRN Loni Niño APRN - CNP   Stopped at 08/31/20 1209    Or    sodium phosphate 21.75 mmol in dextrose 5 % 500 mL IVPB  0.32 mmol/kg Intravenous PRN Loni Niño APRN - CNP        potassium chloride 10 mEq/100 mL IVPB (Peripheral Line)  10 mEq Intravenous PRN NILTON Camarillo - CNP        insulin glargine (LANTUS) injection vial 20 Units  20 Units Subcutaneous Nightly Loni Niño APRN - CNP   20 Units at 09/02/20 2008    perflutren lipid microspheres (DEFINITY) injection 1.65 mg  1.5 mL Intravenous ONCE PRN NILTON Camarillo - CNP        sodium chloride flush 0.9 % injection 10 mL  10 mL Intravenous PRN Gonzalez Single 09/02/2020    MG 1.7 09/01/2020       BMP:   Recent Labs     09/01/20  0451 09/02/20  0554 09/03/20  0618    140 139   K 4.0 3.9 3.5    105 103   CO2 25 25 25   BUN 33* 23 23   CREATININE 1.1* 1.0 1.0   GLUCOSE 136* 77 73*             Assessment: / Plan:    1.  Acute kidney injury stage III.  Acute kidney injury secondary renal hypoperfusion with volume depletion and concurrent use of diuretics . Urine studies also suggest renal hypoperfusion.  Renal function has improved.     2.  Hypophosphatemia. Reflects poor oral intake. Improved with supplement.     3.   Hypokalemia.  Improved with supplement.     4.  Hypomagnesemia.  I supplement.     5.  Anemia.  Iron studies adequate.  Anemia of chronic kidney disease      6.  Hypocalcemia. Calcium levels improved and actually the ionized calcium is somewhat high. PTH elevated at 307 pg/ml. , vitamin D is adequate at 35 ng/ml. PTH is inappropriately high and this may be reflection of acute kidney injury. Will recheck PTH and hold off on starting vitamin D analog such as Calcitrol for secondary hyperparathyroidism due to renal insufficiency.     7.   Metabolic acidosis with lactic acidosis.  Improved.  Lactic acidosis due to acute kidney injury in the setting of use of metformin.            Stacey Green MD  Nephrology  Electronically signed by Stacey Green MD on 9/3/2020 at 1:08 PM      Note: This report was completed utilizing computer voice recognition software. Every effort has been made to ensure accuracy, however; inadvertent computerized transcription errors may be present.

## 2020-09-03 NOTE — PROGRESS NOTES
Pt transferred to [facility] via cardiac monitor and nc O2. Pt only had a partial upper plate, no other belongings In the room with the patient. Pt. Stated that her daughter probably took her glasses home with her. Vital signs stable at time of transfer. Bedside handoff given to [facility] RN.

## 2020-09-04 LAB
BLOOD CULTURE, ROUTINE: NORMAL
CULTURE, BLOOD 2: NORMAL

## 2020-09-12 NOTE — PROGRESS NOTES
Physician Progress Note      Jude Berkowitz  CSN #:                  [id number]  :                       1935  ADMIT DATE:       2020 3:07 AM  Manuel Leonard DATE:        9/3/2020 4:19 PM  RESPONDING  PROVIDER #:        Mitchell Ceja DO          QUERY TEXT:    Dear Attending Provider,    Pt admitted with GI Bleed. Pt noted to have Sepsis per GI Consult/Critical   Care and IM on . If possible, please document in the progress notes and   discharge summary if you are evaluating and /or treating any of the following: The medical record reflects the following:  Risk Factors: Diabetes  Clinical Indicators: Per  GI PN: Sepsis,  Critical Care PN:Hypovolemic   shock versus sepsis,  IM progress note:Hypovolemic shock due to blood   loss versus sepsis. WBC: 27.7, 22.9, 17.2.  : bp-85/59, P-110, R-30.  8   lactic acid-2.7. Treatment: Blood cultures, IVF and lab monitoring. Thank you,  Chen Collins RN, BSN, CCDS  [phone]  Options provided:  -- Sepsis was ruled out  -- Sepsis, now resolved  -- Sepsis, present on admission  -- No Sepsis, localized infection only  -- Other - I will add my own diagnosis  -- Disagree - Not applicable / Not valid  -- Disagree - Clinically unable to determine / Unknown  -- Refer to Clinical Documentation Reviewer    PROVIDER RESPONSE TEXT:    This patient was treated for sepsis this admission, which is now resolved. Query created by:  Kalyani Gage on 9/3/2020 10:48 AM      Electronically signed by:  Mitchell Ceja DO 2020 12:42 PM

## 2020-09-19 NOTE — CONSULTS
[address]                  [address] [facility],  [address]                                  CONSULTATION    PATIENT NAME: Giana Kathleen                 :        1935  MED REC NO:   [id number]                            ROOM:       6355  ACCOUNT NO:   [de-identified]                           ADMIT DATE: 2020  PROVIDER:     David Victor MD    CONSULT DATE:  2020    REASON FOR CONSULTATION:  Status post mechanical fall with cervical  spine fracture. HISTORY OF PRESENT ILLNESS:  This is an 81-year-old female who had  mechanical fall two days prior to evaluation in the ER. She denied loss  of consciousness. She had right shoulder pain, bilateral chest wall  pain, neck pain. Collar was placed, imaging was obtained. CT and MRI  documented C2 and C4 fractures. She was immobilized in a collar. Her H  and P was reviewed. I counseled her greater than 50% of the encounter  time. I answered all of her questions. PHYSICAL EXAMINATION:  NEUROLOGIC:  Motor was full throughout. She was hard of hearing. No  sensory deficits. She did complain of neck pain, the collar was in  place. [place] Coma Scale 15. DIAGNOSIS:  C2, C4 fractures, maintain a custom fit collar for six to  eight weeks. Followup in Neurosurgery Clinic once discharge for video  visit.         Serene Johnston MD    D: 2020 9:10:44       T: 2020 9:46:06     NIRAV/[email or web address]_DENAE  Job#: [id number]     Doc#: [id number]    CC:

## 2020-11-03 PROBLEM — I10 ESSENTIAL HYPERTENSION: Status: RESOLVED | Noted: 2020-11-03 | Resolved: 2020-11-03

## 2021-10-09 LAB — AMMONIA: 22 UMOL/L (ref 11–51)

## 2022-03-21 ENCOUNTER — HOSPITAL ENCOUNTER (OUTPATIENT)
Dept: NUCLEAR MEDICINE | Age: 87
Discharge: HOME | End: 2022-03-21
Payer: MEDICARE

## 2022-03-21 DIAGNOSIS — C34.11 MALIGNANT NEOPLASM OF UPPER LOBE OF RIGHT LUNG (HCC): ICD-10-CM

## 2022-03-21 PROCEDURE — 78815 PET IMAGE W/CT SKULL-THIGH: CPT

## 2022-03-21 PROCEDURE — A9552 F18 FDG: HCPCS | Performed by: RADIOLOGY

## 2022-03-21 PROCEDURE — 3430000000 HC RX DIAGNOSTIC RADIOPHARMACEUTICAL: Performed by: RADIOLOGY

## 2022-03-21 RX ORDER — FLUDEOXYGLUCOSE F 18 200 MCI/ML
10 INJECTION, SOLUTION INTRAVENOUS
Status: COMPLETED | OUTPATIENT
Start: 2022-03-21 | End: 2022-03-21

## 2022-03-21 RX ADMIN — FLUDEOXYGLUCOSE F 18 10 MILLICURIE: 200 INJECTION, SOLUTION INTRAVENOUS at 09:46

## 2022-04-19 ENCOUNTER — APPOINTMENT (OUTPATIENT)
Dept: GENERAL RADIOLOGY | Age: 87
End: 2022-04-19
Payer: MEDICARE

## 2022-04-19 ENCOUNTER — HOSPITAL ENCOUNTER (EMERGENCY)
Age: 87
Discharge: HOME | End: 2022-04-19
Attending: EMERGENCY MEDICINE
Payer: MEDICARE

## 2022-04-19 ENCOUNTER — APPOINTMENT (OUTPATIENT)
Dept: CT IMAGING | Age: 87
End: 2022-04-19
Payer: MEDICARE

## 2022-04-19 VITALS
OXYGEN SATURATION: 92 % | RESPIRATION RATE: 18 BRPM | DIASTOLIC BLOOD PRESSURE: 65 MMHG | SYSTOLIC BLOOD PRESSURE: 142 MMHG | HEART RATE: 82 BPM | TEMPERATURE: 97.8 F

## 2022-04-19 DIAGNOSIS — C34.11 MALIGNANT NEOPLASM OF UPPER LOBE OF RIGHT LUNG (HCC): ICD-10-CM
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
## 2022-04-19 DIAGNOSIS — M25.559 HIP PAIN: ICD-10-CM

## 2022-04-19 DIAGNOSIS — J90 PLEURAL EFFUSION ON RIGHT: ICD-10-CM

## 2022-04-19 LAB
ALBUMIN SERPL-MCNC: 3.3 G/DL (ref 3.5–5.2)
ALP BLD-CCNC: 111 U/L (ref 35–104)
ALT SERPL-CCNC: 21 U/L (ref 0–32)
ANION GAP SERPL CALCULATED.3IONS-SCNC: 13 MMOL/L (ref 7–16)
ANISOCYTOSIS: ABNORMAL
APTT: 28 SEC (ref 24.5–35.1)
AST SERPL-CCNC: 39 U/L (ref 0–31)
BACTERIA: ABNORMAL /HPF
BASOPHILS ABSOLUTE: 0 E9/L (ref 0–0.2)
BASOPHILS RELATIVE PERCENT: 0.1 % (ref 0–2)
BILIRUB SERPL-MCNC: 0.6 MG/DL (ref 0–1.2)
BILIRUBIN URINE: NEGATIVE
BLOOD, URINE: NEGATIVE
BUN BLDV-MCNC: 47 MG/DL (ref 6–23)
BURR CELLS: ABNORMAL
CALCIUM SERPL-MCNC: 10.3 MG/DL (ref 8.6–10.2)
CHLORIDE BLD-SCNC: 97 MMOL/L (ref 98–107)
CLARITY: CLEAR
CO2: 21 MMOL/L (ref 22–29)
COLOR: YELLOW
CREAT SERPL-MCNC: 1.4 MG/DL (ref 0.5–1)
EKG ATRIAL RATE: 74 BPM
EKG P AXIS: 44 DEGREES
EKG P-R INTERVAL: 192 MS
EKG Q-T INTERVAL: 378 MS
EKG QRS DURATION: 96 MS
EKG QTC CALCULATION (BAZETT): 419 MS
EKG R AXIS: 7 DEGREES
EKG T AXIS: 102 DEGREES
EKG VENTRICULAR RATE: 74 BPM
EOSINOPHILS ABSOLUTE: 0 E9/L (ref 0.05–0.5)
EOSINOPHILS RELATIVE PERCENT: 0.3 % (ref 0–6)
EPITHELIAL CELLS, UA: ABNORMAL /HPF
GFR AFRICAN AMERICAN: 43
GFR NON-AFRICAN AMERICAN: 36 ML/MIN/1.73
GLUCOSE BLD-MCNC: 176 MG/DL (ref 74–99)
GLUCOSE URINE: 500 MG/DL
HCT VFR BLD CALC: 40.4 % (ref 34–48)
HEMOGLOBIN: 11.9 G/DL (ref 11.5–15.5)
INR BLD: 1
KETONES, URINE: NEGATIVE MG/DL
LEUKOCYTE ESTERASE, URINE: NEGATIVE
LYMPHOCYTES ABSOLUTE: 0.54 E9/L (ref 1.5–4)
LYMPHOCYTES RELATIVE PERCENT: 6 % (ref 20–42)
MCH RBC QN AUTO: 24.4 PG (ref 26–35)
MCHC RBC AUTO-ENTMCNC: 29.5 % (ref 32–34.5)
MCV RBC AUTO: 82.8 FL (ref 80–99.9)
MONOCYTES ABSOLUTE: 0 E9/L (ref 0.1–0.95)
MONOCYTES RELATIVE PERCENT: 1.3 % (ref 2–12)
NEUTROPHILS ABSOLUTE: 8.46 E9/L (ref 1.8–7.3)
NEUTROPHILS RELATIVE PERCENT: 94 % (ref 43–80)
NITRITE, URINE: NEGATIVE
OVALOCYTES: ABNORMAL
PDW BLD-RTO: 19.1 FL (ref 11.5–15)
PH UA: 5 (ref 5–9)
PLATELET # BLD: 118 E9/L (ref 130–450)
PMV BLD AUTO: 9.6 FL (ref 7–12)
POIKILOCYTES: ABNORMAL
POTASSIUM REFLEX MAGNESIUM: 4.8 MMOL/L (ref 3.5–5)
PROTEIN UA: ABNORMAL MG/DL
PROTHROMBIN TIME: 11.8 SEC (ref 9.3–12.4)
RBC # BLD: 4.88 E12/L (ref 3.5–5.5)
RBC UA: ABNORMAL /HPF (ref 0–2)
SODIUM BLD-SCNC: 131 MMOL/L (ref 132–146)
SPECIFIC GRAVITY UA: 1.02 (ref 1–1.03)
TOTAL CK: 20 U/L (ref 20–180)
TOTAL PROTEIN: 7.1 G/DL (ref 6.4–8.3)
TROPONIN, HIGH SENSITIVITY: 39 NG/L (ref 0–9)
TROPONIN, HIGH SENSITIVITY: 42 NG/L (ref 0–9)
UROBILINOGEN, URINE: 0.2 E.U./DL
WBC # BLD: 9 E9/L (ref 4.5–11.5)
WBC UA: ABNORMAL /HPF (ref 0–5)

## 2022-04-19 PROCEDURE — 84484 ASSAY OF TROPONIN QUANT: CPT

## 2022-04-19 PROCEDURE — 81001 URINALYSIS AUTO W/SCOPE: CPT

## 2022-04-19 PROCEDURE — 85025 COMPLETE CBC W/AUTO DIFF WBC: CPT

## 2022-04-19 PROCEDURE — 70450 CT HEAD/BRAIN W/O DYE: CPT

## 2022-04-19 PROCEDURE — 2580000003 HC RX 258: Performed by: EMERGENCY MEDICINE

## 2022-04-19 PROCEDURE — 82550 ASSAY OF CK (CPK): CPT

## 2022-04-19 PROCEDURE — 6370000000 HC RX 637 (ALT 250 FOR IP): Performed by: EMERGENCY MEDICINE

## 2022-04-19 PROCEDURE — 99285 EMERGENCY DEPT VISIT HI MDM: CPT

## 2022-04-19 PROCEDURE — 72125 CT NECK SPINE W/O DYE: CPT

## 2022-04-19 PROCEDURE — 93005 ELECTROCARDIOGRAM TRACING: CPT | Performed by: EMERGENCY MEDICINE

## 2022-04-19 PROCEDURE — 85610 PROTHROMBIN TIME: CPT

## 2022-04-19 PROCEDURE — 71046 X-RAY EXAM CHEST 2 VIEWS: CPT

## 2022-04-19 PROCEDURE — 85730 THROMBOPLASTIN TIME PARTIAL: CPT

## 2022-04-19 PROCEDURE — 80053 COMPREHEN METABOLIC PANEL: CPT

## 2022-04-19 PROCEDURE — 73521 X-RAY EXAM HIPS BI 2 VIEWS: CPT

## 2022-04-19 PROCEDURE — 36415 COLL VENOUS BLD VENIPUNCTURE: CPT

## 2022-04-19 RX ORDER — 0.9 % SODIUM CHLORIDE 0.9 %
500 INTRAVENOUS SOLUTION INTRAVENOUS ONCE
Status: COMPLETED | OUTPATIENT
Start: 2022-04-19 | End: 2022-04-19

## 2022-04-19 RX ORDER — HYDROCODONE BITARTRATE AND ACETAMINOPHEN 7.5; 325 MG/1; MG/1
1 TABLET ORAL ONCE
Status: COMPLETED | OUTPATIENT
Start: 2022-04-19 | End: 2022-04-19

## 2022-04-19 RX ADMIN — HYDROCODONE BITARTRATE AND ACETAMINOPHEN 1 TABLET: 7.5; 325 TABLET ORAL at 06:56

## 2022-04-19 RX ADMIN — SODIUM CHLORIDE 500 ML: 9 INJECTION, SOLUTION INTRAVENOUS at 06:57

## 2022-04-19 ASSESSMENT — PAIN SCALES - GENERAL: PAINLEVEL_OUTOF10: 10

## 2022-04-19 NOTE — CARE COORDINATION
SS note: Pt to ED for fall @ [facility], which occured last night. Pt has hip pain. Pt now ready for d/c and needs transport back to [place]. Pt uses w/c all times. HEAVEN called Umu @ [facility] [phone] and gave report. She noted there is no transport and her grand dtr is [place]. Pt will need ambulette. HEAVEN called BRIANNA San & arranged transport via [facility] [address] is 1830. He noted they will try to outsource. SW completed Ambulette form and placed in soft chart w/face sheet. HEAVEN notified Es. He noted there was a family member that was here who can possibly transport. He called her & this family member noted she can be here in 61 minutes. 1500  Pt's Dtr came and picked her up. HEAVEN called SHERLY Davis and notified him- to cancel trip.   Electronically signed by CHIKA Hanna on 4/19/2022 at 3:02 PM

## 2022-04-19 NOTE — ED PROVIDER NOTES
Chief Complaint   Patient presents with    Fall     Pt fell at [facility]. Pt admits to hitting head. Denies LOC and -thinners.  Hip Pain     Left. FLORINDA Hawkins is a 81 y.o. female who presents head pain and left hip pain following mechanical fall that she states occurred last night. The complaints are acute, moderate in severity, worsened by nothing and improved by nothing. The patient states that she recently started chemotherapy for lung cancer. She states that she has had a treatment within the last several days. She notes that yesterday she began feeling weak. She says that she was sitting in a chair and went to stand up and felt even weaker. She says that she ended up sliding down the chair. She thinks that she hit her head on the cushion on the back, but she is not entirely sure. She says that she then laid on the floor for an unknown period of time. She states that the fall occurred last night, but when questioned about why she did not come in last night she states that she is not sure. She just says that they decided to send her in to be evaluated this morning. She endorses pain in her left hip which she says worsens with any movement. Of note, the patient states that she was not dizzy prior to or after the fall and did not lose consciousness. The patient states that she does not think she takes any blood thinners. On initial evaluation, she is in a c-collar. The patient denies recent trauma, fever, chills, fatigue, HA, dizziness, lightheadedness, paresthesias, generalized weakness, confusion, forgetfulness, vision changes, eye redness, congestion, rhinorrhea, sore throat, T-spine pain, L-spine pain, chest pain, palpitations, LE edema, SOB, cough, wheezing, abdominal pain, nausea, vomiting, diarrhea, constipation, hematochezia, melena, dysuria, hematuria, flank pain, decreased UOP, swelling, rashes and erythema. ROS is otherwise negative.     The patient is currently taking no blood thinners. Tobacco Hx:   reports that she quit smoking about 9 years ago. She quit after 30.00 years of use. She has never used smokeless tobacco.    Alcohol Hx:   reports no history of alcohol use. Illicit Drug Hx:   reports no history of drug use. The history is provided by the patient. Last Tetanus (if applicable): n/a    Review of Systems:   A complete review of systems was performed and pertinent positives and negatives are stated within the HPI, all other systems reviewed and are negative.     Physical Exam:  GEN: Cooperative, well-developed, well-nourished adult in NAD; pt appears stated age  Head: Normocephalic and atraumatic; no tenderness to palpation anywhere; no abnormal step-offs, hematomas or wounds appreciated  Eyes: PERRL, EOMI, conjunctiva clear, cornea without gross abnormality, no visual deficits noted b/l  Ears: External ear normal-appearing and non-tender b/l; no hearing deficit noted  Nose: Nares patent and free of debris; septum midline; mucosa appears normal; no drainage noted  Throat: Oral mucosa moist and pink with no lesions noted; dentition wnl; no posterior pharyngeal erythema or edema; tonsils are not swollen and there is no exudate noted; no post-nasal drip  Neck: Supple and symmetrical with midline trachea; no cervical or supraclavicular lymphadenopathy noted; thyroid not palpated, but no tenderness or masses noted in the region; normal ROM with no meningeal signs; c-collar in place; no tenderness to palpation of the spinous processes of the C/T/L-spine; chemotherapy port in place  Lungs: LCTAB with no wheezes, rhonchi or rales noted; no respiratory distress, breathing unlabored    CV: RRR, no murmurs, gallops or rubs noted on auscultation, normal S1/S2; UE and LE distal pulses intact b/l +2/4 with no cyanosis noted; no LE edema noted b/l; capillary refill < 2 seconds  ABD: Soft, non-tender, non-distended, no organomegaly, hernias or masses noted  /Rectal: no suprapubic tenderness; remainder of exam deferred  MSK: UEs and LEs without notable trauma, ROM restriction or tenderness to palpation b/l; normal strength throughout  Skin: Skin warm and dry without notable rash, erythema, bruising or lesion; normal turgor  Neuro: A&O x3; CN II-XII appear grossly intact; no focal deficits appreciated; pt thoughtful in discussion and able to answer all questions without issue  Psych: normal attention with no obvious AVH, normal mood, normal affect    ---------------------------------- PAST HISTORY ---------------------------------------------  Past Medical History:  has a past medical history of CAD (coronary artery disease), Cancer ([facility] [address] COPD (chronic obstructive pulmonary disease) ([facility] [address] Diabetes mellitus ([facility] Hyperlipidemia, and Hypertension. Past Surgical History:  has a past surgical history that includes Cardiac surgery; Hysterectomy; Coronary artery bypass graft (april 2013); Achilles tendon surgery (Left); Colonoscopy; eye surgery (Right); other surgical history (Right, 7 7 14); Upper gastrointestinal endoscopy (N/A, 8/30/2020); and Upper gastrointestinal endoscopy (8/30/2020). Social History:  reports that she quit smoking about 9 years ago. She quit after 30.00 years of use. She has never used smokeless tobacco. She reports that she does not drink alcohol and does not use drugs. Family History: family history is not on file. Home Meds: Not in a hospital admission. The patients home medications have been reviewed. Allergies: Naproxen, Nsaids, and Vicodin [hydrocodone-acetaminophen]    ------------------------- NURSING NOTES AND VITALS REVIEWED ---------------------------  Date / Time Roomed:  4/19/2022  5:28 AM  ED Bed Assignment:  01/01    The nursing notes within the ED encounter and vital signs as below have been reviewed.    BP (!) 142/65   Pulse 82   Temp 97.8 °F (36.6 °C) (Oral)   Resp 18   SpO2 92% Comment: Dr. Samir Coulter aware of SPO2 and states it is ok to dc given pt history  -------------------------------------------------- RESULTS / INTERVENTIONS -------------------------------------------------  All laboratory and radiology tests have been reviewed by this physician.     LABS:  Results for orders placed or performed during the hospital encounter of 04/19/22   CBC with Auto Differential   Result Value Ref Range    WBC 9.0 4.5 - 11.5 E9/L    RBC 4.88 3.50 - 5.50 E12/L    Hemoglobin 11.9 11.5 - 15.5 g/dL    Hematocrit 40.4 34.0 - 48.0 %    MCV 82.8 80.0 - 99.9 fL    MCH 24.4 (L) 26.0 - 35.0 pg    MCHC 29.5 (L) 32.0 - 34.5 %    RDW 19.1 (H) 11.5 - 15.0 fL    Platelets 389 (L) 564 - 450 E9/L    MPV 9.6 7.0 - 12.0 fL    Neutrophils % 94.0 (H) 43.0 - 80.0 %    Lymphocytes % 6.0 (L) 20.0 - 42.0 %    Monocytes % 1.3 (L) 2.0 - 12.0 %    Eosinophils % 0.3 0.0 - 6.0 %    Basophils % 0.1 0.0 - 2.0 %    Neutrophils Absolute 8.46 (H) 1.80 - 7.30 E9/L    Lymphocytes Absolute 0.54 (L) 1.50 - 4.00 E9/L    Monocytes Absolute 0.00 (L) 0.10 - 0.95 E9/L    Eosinophils Absolute 0.00 (L) 0.05 - 0.50 E9/L    Basophils Absolute 0.00 0.00 - 0.20 E9/L    Anisocytosis 2+     Poikilocytes 2+     Gerard Cells 1+     Ovalocytes 1+    Comprehensive Metabolic Panel w/ Reflex to MG   Result Value Ref Range    Sodium 131 (L) 132 - 146 mmol/L    Potassium reflex Magnesium 4.8 3.5 - 5.0 mmol/L    Chloride 97 (L) 98 - 107 mmol/L    CO2 21 (L) 22 - 29 mmol/L    Anion Gap 13 7 - 16 mmol/L    Glucose 176 (H) 74 - 99 mg/dL    BUN 47 (H) 6 - 23 mg/dL    CREATININE 1.4 (H) 0.5 - 1.0 mg/dL    GFR Non-African American 36 >=60 mL/min/1.73    GFR African American 43     Calcium 10.3 (H) 8.6 - 10.2 mg/dL    Total Protein 7.1 6.4 - 8.3 g/dL    Albumin 3.3 (L) 3.5 - 5.2 g/dL    Total Bilirubin 0.6 0.0 - 1.2 mg/dL    Alkaline Phosphatase 111 (H) 35 - 104 U/L    ALT 21 0 - 32 U/L    AST 39 (H) 0 - 31 U/L   CK   Result Value Ref Range    Total CK 20 20 - 180 U/L   Protime-INR   Result Value Ref Range Protime 11.8 9.3 - 12.4 sec    INR 1.0    APTT   Result Value Ref Range    aPTT 28.0 24.5 - 35.1 sec   Urinalysis with Microscopic   Result Value Ref Range    Color, UA Yellow Straw/Yellow    Clarity, UA Clear Clear    Glucose, Ur 500 (A) Negative mg/dL    Bilirubin Urine Negative Negative    Ketones, Urine Negative Negative mg/dL    Specific Gravity, UA 1.020 1.005 - 1.030    Blood, Urine Negative Negative    pH, UA 5.0 5.0 - 9.0    Protein, UA TRACE Negative mg/dL    Urobilinogen, Urine 0.2 <2.0 E.U./dL    Nitrite, Urine Negative Negative    Leukocyte Esterase, Urine Negative Negative    WBC, UA 1-3 0 - 5 /HPF    RBC, UA 2-5 0 - 2 /HPF    Epithelial Cells, UA RARE /HPF    Bacteria, UA RARE (A) None Seen /HPF   Troponin   Result Value Ref Range    Troponin, High Sensitivity 42 (H) 0 - 9 ng/L   Troponin   Result Value Ref Range    Troponin, High Sensitivity 39 (H) 0 - 9 ng/L   EKG 12 Lead   Result Value Ref Range    Ventricular Rate 74 BPM    Atrial Rate 74 BPM    P-R Interval 192 ms    QRS Duration 96 ms    Q-T Interval 378 ms    QTc Calculation (Bazett) 419 ms    P Axis 44 degrees    R Axis 7 degrees    T Axis 102 degrees       RADIOLOGY: Interpreted by Radiologist unless otherwise noted. XR HIP BILATERAL W AP PELVIS (2 VIEWS)   Final Result   Postsurgical changes at both femora with intact hardware. No acute fracture   or dislocation at either hip or involving the pelvis. XR CHEST (2 VW)   Final Result   New right pleural effusion with airspace disease. Suspected developing right   paratracheal adenopathy. CT Head WO Contrast   Final Result   1. There is no acute intracranial abnormality. Specifically, there is no   intracranial hemorrhage. 2. Atrophy and periventricular leukomalacia,   3. Chronic dense mucosal thickening seen within the right sphenoid sinus. CT Cervical Spine WO Contrast   Final Result   1.  There is no acute compression fracture or subluxation of the cervical spine.   2. Multilevel degenerative disc and degenerative joint disease. 3. Large irregular mass seen within the right lung apex measuring 3.5 by 2.4   cm. 4. Moderate size right pleural effusion   5. Thickening of the septa and vascular bundles worrisome for lymphangitic   carcinomatosis. The appearance of the right upper lobe is not significant   changed compared to prior PET scan of 03/21/2022. EKG: As interpreted by this ER physician. Rate: 74 bpm  Rhythm: Sinus  Axis: Normal  ST Segments: No acute changes  T-waves: Nonspecific changes  Interpretation: NSR with nonspecific T wave abnormality  Comparison: changes compared to previous EKG    Oxygen Saturation Interpretation: Normal    Meds Given:  Medications   0.9 % sodium chloride bolus (0 mLs IntraVENous Stopped 4/19/22 0818)   HYDROcodone-acetaminophen (NORCO) 7.5-325 MG per tablet 1 tablet (1 tablet Oral Given 4/19/22 0656)       Procedures:  No procedures performed. --------------------------------- PROGRESS NOTES / ADDITIONAL PROVIDER NOTES ---------------------------------  Consultations:  As outlined below. ED Course:       4:07 PM: All results were discussed with the patient and I have provided specific details regarding the plan of care, diagnosis and associated prognosis. The patient tolerated the visit well and, at the time of discharge, was without objective evidence of hemodynamic instability or an acute process requiring hospitalization and inpatient management. The patient was seen by myself and the assigned attending physician, Dr. Peace Diego, who agreed with my assessment and plan as laid out herein. The importance of follow-up was discussed at the end of the visit and I recommended that the patient be seen by their primary care physician in 2-3 days. The patient verbalized their understanding and agreement with the plan as presented and stated their intention to follow up.  Reasons to return to the ER or seek immediate evaluation by a medical provider were discussed at length and all questions were answered to the highest degree of accuracy possible based on the current information available. At this time the patient is stable and safe for discharge. MDM:  Patient presented with head pain and left hip pain following mechanical fall at home last night. On arrival, all vital signs within normal limits. EKG and physical exam were  as documented above. Labs were remarkable for Trope within normal limits x2, no leukocytosis and a mildly elevated creatinine at 1.4, a value which approximated the patient's most recent baseline. CT had revealed chronic changes, but no acute intracranial pathology was identified. CT of the cervical spine revealed chronic changes, but no acute fractures or dislocations. Incidentally noted was the patient's known right lung mass. That was also noted on the patient's chest x-ray. X-ray of the hips and pelvis was negative for acute fractures or dislocations. Changes associated with the patient's prior bilateral hip arthroplasties noted. Given the lack of findings necessitating further emergent intervention or hospitalization, the decision was made to discharge the patient with recommendations for follow up and symptomatic care. Reasons to return to the ER were discussed and all questions were answered. The patient was stable at the time of their disposition. Discharge Medication List as of 4/19/2022  1:30 PM          Diagnosis:  1. Fall from bed, initial encounter    2. Hip pain    3. Malignant neoplasm of upper lobe of right lung ([address]    4. Pleural effusion on right        Disposition:  Patient's disposition: Discharge to home. Patient's condition is stable. This patient was seen, examined and treated with Dr. Alison Cook. All pertinent aspects of the patient's care were discussed with the attending physician.        Cynthia Tinajero DO  Resident  04/19/22 1928      ATTENDING PROVIDER ATTESTATION:     Donnie Chandra presented to the emergency department for evaluation of Fall (Pt fell at [facility]. Pt admits to hitting head. Denies LOC and -thinners.) and Hip Pain (Left. )   and was initially evaluated by the Medical Resident. See Original ED Note for H&P and ED course above. I have reviewed and discussed the case, including pertinent history (medical, surgical, family and social) and exam findings with the Medical Resident assigned to Donnie Prateek. I have personally performed and/or participated in the history, exam, medical decision making, and procedures and agree with all pertinent clinical information. I have reviewed my findings and recommendations with the assigned Medical Resident, Donnie Chandra and members of family present at the time of disposition. My findings/plan: The primary encounter diagnosis was Fall from bed, initial encounter. Diagnoses of Hip pain, Malignant neoplasm of upper lobe of right lung ([address] and Pleural effusion on right were also pertinent to this visit.   Discharge Medication List as of 4/19/2022  1:30 PM        DO Shayne Wallace DO  05/19/22 0700

## 2022-04-19 NOTE — ED NOTES
Pt placed on NC upon arrival to ED. Pt has history of lung cancer and has recently started chemotherapy. Pt 87% on RA. Pt placed on 2L NC and stating 97%.       Aruna Adorno RN  04/19/22 8521

## 2022-04-19 NOTE — ED NOTES
Per Dr. Leon Cortes, pt is okay to return to assisted living without ambulating. Per pt she does not ambulate and is wheelchair bound at the facility.      Camorn Client, JERRIN  52/41/46 [id number]

## 2022-04-23 ENCOUNTER — HOSPITAL ENCOUNTER (INPATIENT)
Age: 87
LOS: 3 days | Discharge: NURSING, REHAB OR LONG-TERM CARE | DRG: 871 | End: 2022-04-26
Attending: EMERGENCY MEDICINE | Admitting: INTERNAL MEDICINE
Payer: MEDICARE

## 2022-04-23 ENCOUNTER — APPOINTMENT (OUTPATIENT)
Dept: GENERAL RADIOLOGY | Age: 87
DRG: 871 | End: 2022-04-23
Payer: MEDICARE

## 2022-04-23 ENCOUNTER — APPOINTMENT (OUTPATIENT)
Dept: CT IMAGING | Age: 87
DRG: 871 | End: 2022-04-23
Payer: MEDICARE

## 2022-04-23 DIAGNOSIS — I82.4Z9 ACUTE DEEP VEIN THROMBOSIS (DVT) OF DISTAL VEIN OF LOWER EXTREMITY, UNSPECIFIED LATERALITY (HCC): ICD-10-CM

## 2022-04-23 DIAGNOSIS — J95.811 POSTPROCEDURAL PNEUMOTHORAX: ICD-10-CM

## 2022-04-23 DIAGNOSIS — Z76.89 ENCOUNTER FOR ASSESSMENT FOR DEEP VEIN THROMBOSIS (DVT): Primary | ICD-10-CM

## 2022-04-23 DIAGNOSIS — I26.99 ACUTE PULMONARY EMBOLISM WITHOUT ACUTE COR PULMONALE, UNSPECIFIED PULMONARY EMBOLISM TYPE (HCC): ICD-10-CM

## 2022-04-23 LAB
ACANTHOCYTES: ABNORMAL
ALBUMIN SERPL-MCNC: 3.4 G/DL (ref 3.5–5.2)
ALP BLD-CCNC: 114 U/L (ref 35–104)
ALT SERPL-CCNC: 17 U/L (ref 0–32)
ANION GAP SERPL CALCULATED.3IONS-SCNC: 10 MMOL/L (ref 7–16)
ANISOCYTOSIS: ABNORMAL
APTT: 26.1 SEC (ref 24.5–35.1)
AST SERPL-CCNC: 22 U/L (ref 0–31)
BASOPHILS ABSOLUTE: 0.03 E9/L (ref 0–0.2)
BASOPHILS RELATIVE PERCENT: 0.9 % (ref 0–2)
BILIRUB SERPL-MCNC: 0.3 MG/DL (ref 0–1.2)
BUN BLDV-MCNC: 53 MG/DL (ref 6–23)
BURR CELLS: ABNORMAL
CALCIUM SERPL-MCNC: 10.1 MG/DL (ref 8.6–10.2)
CHLORIDE BLD-SCNC: 104 MMOL/L (ref 98–107)
CHP ED QC CHECK: YES
CO2: 22 MMOL/L (ref 22–29)
CREAT SERPL-MCNC: 1.3 MG/DL (ref 0.5–1)
D DIMER: >5250 NG/ML DDU
EOSINOPHILS ABSOLUTE: 0.17 E9/L (ref 0.05–0.5)
EOSINOPHILS RELATIVE PERCENT: 5.2 % (ref 0–6)
GFR AFRICAN AMERICAN: 47
GFR NON-AFRICAN AMERICAN: 39 ML/MIN/1.73
GLUCOSE BLD-MCNC: 149 MG/DL
GLUCOSE BLD-MCNC: 154 MG/DL (ref 74–99)
HCT VFR BLD CALC: 36.9 % (ref 34–48)
HEMOGLOBIN: 11 G/DL (ref 11.5–15.5)
INFLUENZA A BY PCR: NOT DETECTED
INFLUENZA B BY PCR: NOT DETECTED
LACTIC ACID, SEPSIS: 1.2 MMOL/L (ref 0.5–1.9)
LYMPHOCYTES ABSOLUTE: 0.58 E9/L (ref 1.5–4)
LYMPHOCYTES RELATIVE PERCENT: 18.3 % (ref 20–42)
MAGNESIUM: 2.1 MG/DL (ref 1.6–2.6)
MCH RBC QN AUTO: 24.2 PG (ref 26–35)
MCHC RBC AUTO-ENTMCNC: 29.8 % (ref 32–34.5)
MCV RBC AUTO: 81.3 FL (ref 80–99.9)
METER GLUCOSE: 149 MG/DL (ref 74–99)
MONOCYTES ABSOLUTE: 0 E9/L (ref 0.1–0.95)
MONOCYTES RELATIVE PERCENT: 4.4 % (ref 2–12)
NEUTROPHILS ABSOLUTE: 2.43 E9/L (ref 1.8–7.3)
NEUTROPHILS RELATIVE PERCENT: 75.7 % (ref 43–80)
OVALOCYTES: ABNORMAL
PDW BLD-RTO: 18.2 FL (ref 11.5–15)
PHOSPHORUS: 2.5 MG/DL (ref 2.5–4.5)
PLATELET # BLD: 41 E9/L (ref 130–450)
PLATELET CONFIRMATION: NORMAL
PMV BLD AUTO: 10.1 FL (ref 7–12)
POIKILOCYTES: ABNORMAL
POTASSIUM SERPL-SCNC: 5 MMOL/L (ref 3.5–5)
PRO-BNP: 1297 PG/ML (ref 0–450)
RBC # BLD: 4.54 E12/L (ref 3.5–5.5)
SARS-COV-2, NAAT: NOT DETECTED
SCHISTOCYTES: ABNORMAL
SODIUM BLD-SCNC: 136 MMOL/L (ref 132–146)
TOTAL PROTEIN: 7.4 G/DL (ref 6.4–8.3)
TROPONIN, HIGH SENSITIVITY: 43 NG/L (ref 0–9)
TROPONIN, HIGH SENSITIVITY: 44 NG/L (ref 0–9)
WBC # BLD: 3.2 E9/L (ref 4.5–11.5)

## 2022-04-23 PROCEDURE — 96365 THER/PROPH/DIAG IV INF INIT: CPT

## 2022-04-23 PROCEDURE — 85378 FIBRIN DEGRADE SEMIQUANT: CPT

## 2022-04-23 PROCEDURE — 82962 GLUCOSE BLOOD TEST: CPT

## 2022-04-23 PROCEDURE — 99285 EMERGENCY DEPT VISIT HI MDM: CPT

## 2022-04-23 PROCEDURE — 87635 SARS-COV-2 COVID-19 AMP PRB: CPT

## 2022-04-23 PROCEDURE — 84484 ASSAY OF TROPONIN QUANT: CPT

## 2022-04-23 PROCEDURE — 2500000003 HC RX 250 WO HCPCS: Performed by: STUDENT IN AN ORGANIZED HEALTH CARE EDUCATION/TRAINING PROGRAM

## 2022-04-23 PROCEDURE — 71275 CT ANGIOGRAPHY CHEST: CPT

## 2022-04-23 PROCEDURE — 87040 BLOOD CULTURE FOR BACTERIA: CPT

## 2022-04-23 PROCEDURE — 71045 X-RAY EXAM CHEST 1 VIEW: CPT

## 2022-04-23 PROCEDURE — 6360000002 HC RX W HCPCS: Performed by: STUDENT IN AN ORGANIZED HEALTH CARE EDUCATION/TRAINING PROGRAM

## 2022-04-23 PROCEDURE — 93005 ELECTROCARDIOGRAM TRACING: CPT | Performed by: STUDENT IN AN ORGANIZED HEALTH CARE EDUCATION/TRAINING PROGRAM

## 2022-04-23 PROCEDURE — 85025 COMPLETE CBC W/AUTO DIFF WBC: CPT

## 2022-04-23 PROCEDURE — 85730 THROMBOPLASTIN TIME PARTIAL: CPT

## 2022-04-23 PROCEDURE — 83605 ASSAY OF LACTIC ACID: CPT

## 2022-04-23 PROCEDURE — 96366 THER/PROPH/DIAG IV INF ADDON: CPT

## 2022-04-23 PROCEDURE — 2580000003 HC RX 258: Performed by: STUDENT IN AN ORGANIZED HEALTH CARE EDUCATION/TRAINING PROGRAM

## 2022-04-23 PROCEDURE — 6360000004 HC RX CONTRAST MEDICATION: Performed by: RADIOLOGY

## 2022-04-23 PROCEDURE — 80053 COMPREHEN METABOLIC PANEL: CPT

## 2022-04-23 PROCEDURE — 1200000000 HC SEMI PRIVATE

## 2022-04-23 PROCEDURE — 83735 ASSAY OF MAGNESIUM: CPT

## 2022-04-23 PROCEDURE — 84100 ASSAY OF PHOSPHORUS: CPT

## 2022-04-23 PROCEDURE — 96375 TX/PRO/DX INJ NEW DRUG ADDON: CPT

## 2022-04-23 PROCEDURE — 87502 INFLUENZA DNA AMP PROBE: CPT

## 2022-04-23 PROCEDURE — 2580000003 HC RX 258

## 2022-04-23 PROCEDURE — 83880 ASSAY OF NATRIURETIC PEPTIDE: CPT

## 2022-04-23 RX ORDER — SODIUM CHLORIDE 0.9 % (FLUSH) 0.9 %
5-40 SYRINGE (ML) INJECTION PRN
Status: DISCONTINUED | OUTPATIENT
Start: 2022-04-23 | End: 2022-04-26 | Stop reason: HOSPADM

## 2022-04-23 RX ORDER — SODIUM CHLORIDE 9 MG/ML
INJECTION, SOLUTION INTRAVENOUS
Status: COMPLETED
Start: 2022-04-23 | End: 2022-04-23

## 2022-04-23 RX ORDER — POLYETHYLENE GLYCOL 3350 17 G/17G
17 POWDER, FOR SOLUTION ORAL DAILY PRN
Status: DISCONTINUED | OUTPATIENT
Start: 2022-04-23 | End: 2022-04-26 | Stop reason: HOSPADM

## 2022-04-23 RX ORDER — ENOXAPARIN SODIUM 100 MG/ML
1 INJECTION SUBCUTANEOUS ONCE
Status: COMPLETED | OUTPATIENT
Start: 2022-04-23 | End: 2022-04-23

## 2022-04-23 RX ORDER — ACETAMINOPHEN 325 MG/1
650 TABLET ORAL EVERY 6 HOURS PRN
Status: DISCONTINUED | OUTPATIENT
Start: 2022-04-23 | End: 2022-04-26 | Stop reason: HOSPADM

## 2022-04-23 RX ORDER — DEXTROSE MONOHYDRATE 50 MG/ML
100 INJECTION, SOLUTION INTRAVENOUS PRN
Status: DISCONTINUED | OUTPATIENT
Start: 2022-04-23 | End: 2022-04-26 | Stop reason: HOSPADM

## 2022-04-23 RX ORDER — ONDANSETRON 2 MG/ML
4 INJECTION INTRAMUSCULAR; INTRAVENOUS EVERY 6 HOURS PRN
Status: DISCONTINUED | OUTPATIENT
Start: 2022-04-23 | End: 2022-04-26 | Stop reason: HOSPADM

## 2022-04-23 RX ORDER — SENNA AND DOCUSATE SODIUM 50; 8.6 MG/1; MG/1
1 TABLET, FILM COATED ORAL 2 TIMES DAILY
Status: DISCONTINUED | OUTPATIENT
Start: 2022-04-24 | End: 2022-04-26 | Stop reason: HOSPADM

## 2022-04-23 RX ORDER — SODIUM CHLORIDE 9 MG/ML
INJECTION, SOLUTION INTRAVENOUS PRN
Status: DISCONTINUED | OUTPATIENT
Start: 2022-04-23 | End: 2022-04-26 | Stop reason: HOSPADM

## 2022-04-23 RX ORDER — ACETAMINOPHEN 650 MG/1
650 SUPPOSITORY RECTAL EVERY 6 HOURS PRN
Status: DISCONTINUED | OUTPATIENT
Start: 2022-04-23 | End: 2022-04-26 | Stop reason: HOSPADM

## 2022-04-23 RX ORDER — CITALOPRAM 10 MG/1
20 TABLET ORAL DAILY
Status: DISCONTINUED | OUTPATIENT
Start: 2022-04-24 | End: 2022-04-26 | Stop reason: HOSPADM

## 2022-04-23 RX ORDER — DEXTROSE MONOHYDRATE 25 G/50ML
12.5 INJECTION, SOLUTION INTRAVENOUS PRN
Status: DISCONTINUED | OUTPATIENT
Start: 2022-04-23 | End: 2022-04-23 | Stop reason: CLARIF

## 2022-04-23 RX ORDER — ONDANSETRON 2 MG/ML
4 INJECTION INTRAMUSCULAR; INTRAVENOUS ONCE
Status: DISCONTINUED | OUTPATIENT
Start: 2022-04-23 | End: 2022-04-26 | Stop reason: HOSPADM

## 2022-04-23 RX ORDER — SODIUM CHLORIDE 0.9 % (FLUSH) 0.9 %
5-40 SYRINGE (ML) INJECTION EVERY 12 HOURS SCHEDULED
Status: DISCONTINUED | OUTPATIENT
Start: 2022-04-24 | End: 2022-04-26 | Stop reason: HOSPADM

## 2022-04-23 RX ORDER — BUSPIRONE HYDROCHLORIDE 10 MG/1
5 TABLET ORAL 2 TIMES DAILY
Status: DISCONTINUED | OUTPATIENT
Start: 2022-04-24 | End: 2022-04-26 | Stop reason: HOSPADM

## 2022-04-23 RX ORDER — PANTOPRAZOLE SODIUM 40 MG/1
40 TABLET, DELAYED RELEASE ORAL
Status: DISCONTINUED | OUTPATIENT
Start: 2022-04-24 | End: 2022-04-24

## 2022-04-23 RX ORDER — ONDANSETRON 4 MG/1
4 TABLET, ORALLY DISINTEGRATING ORAL EVERY 8 HOURS PRN
Status: DISCONTINUED | OUTPATIENT
Start: 2022-04-23 | End: 2022-04-26 | Stop reason: HOSPADM

## 2022-04-23 RX ORDER — INSULIN GLARGINE 100 [IU]/ML
20 INJECTION, SOLUTION SUBCUTANEOUS NIGHTLY
Status: DISCONTINUED | OUTPATIENT
Start: 2022-04-24 | End: 2022-04-26 | Stop reason: HOSPADM

## 2022-04-23 RX ORDER — 0.9 % SODIUM CHLORIDE 0.9 %
500 INTRAVENOUS SOLUTION INTRAVENOUS ONCE
Status: COMPLETED | OUTPATIENT
Start: 2022-04-23 | End: 2022-04-23

## 2022-04-23 RX ORDER — NICOTINE POLACRILEX 4 MG
15 LOZENGE BUCCAL PRN
Status: DISCONTINUED | OUTPATIENT
Start: 2022-04-23 | End: 2022-04-26 | Stop reason: HOSPADM

## 2022-04-23 RX ADMIN — ENOXAPARIN SODIUM 60 MG: 100 INJECTION SUBCUTANEOUS at 22:19

## 2022-04-23 RX ADMIN — WATER 1000 MG: 1 INJECTION INTRAMUSCULAR; INTRAVENOUS; SUBCUTANEOUS at 20:10

## 2022-04-23 RX ADMIN — IOPAMIDOL 75 ML: 755 INJECTION, SOLUTION INTRAVENOUS at 20:00

## 2022-04-23 RX ADMIN — DEXTROSE MONOHYDRATE 100 MG: 5 INJECTION INTRAVENOUS at 20:15

## 2022-04-23 RX ADMIN — Medication 500 ML: at 20:09

## 2022-04-23 RX ADMIN — SODIUM CHLORIDE 500 ML: 9 INJECTION, SOLUTION INTRAVENOUS at 20:09

## 2022-04-23 ASSESSMENT — PAIN - FUNCTIONAL ASSESSMENT: PAIN_FUNCTIONAL_ASSESSMENT: NONE - DENIES PAIN

## 2022-04-23 ASSESSMENT — ENCOUNTER SYMPTOMS: TACHYPNEA: 1

## 2022-04-23 ASSESSMENT — PAIN SCALES - GENERAL: PAINLEVEL_OUTOF10: 0

## 2022-04-23 NOTE — ED NOTES
Radiology Procedure Waiver   Name: Rubén Montano  : 1935  MRN: [id number]    Date:  22    Time: 7:03 PM EDT    Benefits of immediately proceeding with radiology exam(s) without pre-testing outweigh the risks or are not indicated as specified below and therefore the following is/are being waived:    [] Benefits of immediate radiology exam(s) outweigh any risk. OR    Pre-exam testing is not indicated for the following reason(s):  [] Pregnancy test   [] Patients LMP on-time and regular.   [] Patient had Tubal Ligation or has other Contraception Device. [] Patient  is Menopausal or Premenarcheal.    [] Patient had Full or Partial Hysterectomy. [] Protocol for CT contrast allegry   [] Patient has tolerated well previously   [] Patient does not have a true allergy    [] MRI Questionnaire     [x] BUN/Creatinine   [] Patient age w/no hx of renal dysfunction. [] Patient on Dialysis. [] Recent Normal Labs.   Electronically signed by Myles Santiago DO on 22 at 7:03 PM EDT               Myles Santiago DO  Resident  22 [phone]

## 2022-04-23 NOTE — ED PROVIDER NOTES
81-year-old female presents today to the emergency department via EMS from nursing home facility with complaints of shortness of breath as well as nausea. States that her symptoms started 3 days ago. She has a past medical history of lung cancer and is currently being treated with chemotherapy. Symptoms started after the chemotherapy. She describes her symptoms as moderate in severity, persistent in nature and progressively getting worse over time. Nothing seems to make her symptoms better or worse. Denies any associated cough, congestion, fevers or chills. She does not have any associated abdominal pain. She does not have any chest pain, lightheadedness or dizziness. Review of Systems   Constitutional: Negative for diaphoresis and fever. HENT: Negative for ear pain, hearing loss, rhinorrhea, sinus pain, sore throat and trouble swallowing. Eyes: Negative for pain. Respiratory: Positive for shortness of breath. Negative for cough and wheezing. Cardiovascular: Negative for chest pain and palpitations. Gastrointestinal: Positive for nausea. Negative for abdominal pain, diarrhea and vomiting. Endocrine: Negative for polyuria. Genitourinary: Negative for flank pain, frequency, hematuria and urgency. Musculoskeletal: Negative for back pain, neck pain and neck stiffness. Neurological: Negative for dizziness, speech difficulty, weakness, light-headedness and numbness. Psychiatric/Behavioral: Negative for confusion. The patient is not nervous/anxious. Physical Exam  Constitutional:       General: She is not in acute distress. Appearance: Normal appearance. She is not ill-appearing, toxic-appearing or diaphoretic. HENT:      Head: Normocephalic and atraumatic. Nose: No rhinorrhea. Eyes:      General: No scleral icterus. Extraocular Movements: Extraocular movements intact. Pupils: Pupils are equal, round, and reactive to light.    Cardiovascular: Heart sounds: Normal heart sounds. No murmur heard. No friction rub. No gallop. Pulmonary:      Breath sounds: Normal breath sounds. No wheezing, rhonchi or rales. Abdominal:      Palpations: Abdomen is soft. Tenderness: There is no abdominal tenderness. Musculoskeletal:         General: No swelling. Cervical back: Normal range of motion. No rigidity or tenderness. Right lower leg: No edema. Left lower leg: No edema. Lymphadenopathy:      Cervical: No cervical adenopathy. Skin:     General: Skin is warm and dry. Coloration: Skin is not jaundiced. Neurological:      General: No focal deficit present. Mental Status: She is alert and oriented to person, place, and time. Cranial Nerves: No cranial nerve deficit. Sensory: No sensory deficit. Motor: No weakness. Psychiatric:         Mood and Affect: Mood normal.         Behavior: Behavior normal.         Thought Content: Thought content normal.         Judgment: Judgment normal.          Procedures     MDM  Number of Diagnoses or Management Options  Diagnosis management comments: This is a 52-year-old female who presents today to the emergency department for evaluation of shortness of breath and nausea. Symptoms started on Thursday after chemotherapy. On arrival to the ED patient is nontoxic, no acute distress. Vital signs reviewed and initially she was tachycardic. This did improve. Otherwise normal vitals. On exam her lungs are clear to auscultation bilaterally. She does not have any lower extremity edema. Abdomen is soft, nontender, nondistended. Given patient's history of cancer and her complaint of shortness of breath a diagnosis of pulmonary embolism was considered. CTA was ordered in addition to the appropriate work-up. Patient's lab work shows a low platelet count that is chronic for this patient. She does have an elevated BUN and creatinine which are also chronic for her.   EKG showed normal signed by Rubén Mckeon DO on 4/23/22 at 4:55 PM EDT       [TG]   1737 EKG: This EKG is signed and interpreted by me. Rate: 83  Rhythm: Sinus  Interpretation: no acute changes and non-specific EKG  Comparison: Changes compared to most recent EKG of 4/19/2021. Question new anterior septal Q waves. We will repeat EKG. [TG]   1801 6:01 PM EDT  I have signed this patient out to the oncoming physician, Dr. Viola Mansfield. I have discussed the patient's initial exam, treatment and plan of care with the on coming physician. I have notified the patient / family of the change in treating physician and answered their questions to this point. [TG]   2036 Dr. Sanchez Rolling- nonocclusive thormbus both lungs in the pulmonary artery     Noted liver lesion on CT imaging  [NS]   2206 [id number]  [NS]      ED Course User Index  [NS] Myles Santiago DO  [TG] Rubén Mckeon DO     ED Course as of 04/25/22 0150   Sat Apr 23, 2022   1655 ATTENDING PROVIDER ATTESTATION:     I have personally performed and/or participated in the history, exam, medical decision making, and procedures and agree with all pertinent clinical information unless otherwise noted. I have also reviewed and agree with the past medical, family and social history unless otherwise noted. I have discussed this patient in detail with the resident and provided the instruction and education regarding the evidence-based evaluation and treatment of shortness of breath. History: Patient on chemotherapy secondary to diagnosis of right upper lobe lung cancer. She presents today from [facility] stating that she had 1 day of shortness of breath. Nonproductive cough. No sputum production. No fever. My findings: Rubén Montano is a 81 y.o. female whom is in no distress. Physical exam reveals she is alert and oriented. Heart is regular, lungs are coarse bilaterally with scattered rhonchi in the right upper lobe.   Extremities are intact with 1+ lower edema that is symmetrical.  No palpable venous cord nor pain to palpation of the lower extremities. Good distal pulse and capillary refill. Skin is warm and dry. My plan: Symptomatic and supportive care. X-ray, labs. Electronically signed by Radha Acuna DO on 4/23/22 at 4:55 PM EDT       [TG]   1739 EKG: This EKG is signed and interpreted by me. Rate: 83  Rhythm: Sinus  Interpretation: no acute changes and non-specific EKG  Comparison: Changes compared to most recent EKG of 4/19/2021. Question new anterior septal Q waves. We will repeat EKG. [TG]   1801 6:01 PM EDT  I have signed this patient out to the oncoming physician, Dr. Monique Alford. I have discussed the patient's initial exam, treatment and plan of care with the on coming physician. I have notified the patient / family of the change in treating physician and answered their questions to this point. [TG]   2036 Dr. Codi Downs- nonocclusive thormbus both lungs in the pulmonary artery     Noted liver lesion on CT imaging  [NS]   2206 [id number]  [NS]      ED Course User Index  [NS] Sadiq Valentine DO  [TG] Radha Acuna DO       --------------------------------------------- PAST HISTORY ---------------------------------------------  Past Medical History:  has a past medical history of CAD (coronary artery disease), Cancer ([facility] [address] COPD (chronic obstructive pulmonary disease) ([facility] [address] Diabetes mellitus ([facility] [address] Hyperlipidemia, and Hypertension. Past Surgical History:  has a past surgical history that includes Cardiac surgery; Hysterectomy; Coronary artery bypass graft (april 2013); Achilles tendon surgery (Left); Colonoscopy; eye surgery (Right); other surgical history (Right, 7 7 14); Upper gastrointestinal endoscopy (N/A, 8/30/2020); and Upper gastrointestinal endoscopy (8/30/2020). Social History:  reports that she quit smoking about 9 years ago. She quit after 30.00 years of use.  She has never used smokeless tobacco. She reports that she does not drink alcohol and does not use drugs. Family History: family history is not on file. The patients home medications have been reviewed.     Allergies: Naproxen, Nsaids, and Vicodin [hydrocodone-acetaminophen]    -------------------------------------------------- RESULTS -------------------------------------------------    LABS:  Results for orders placed or performed during the hospital encounter of 04/23/22   COVID-19, Rapid    Specimen: Nasopharyngeal Swab   Result Value Ref Range    SARS-CoV-2, NAAT Not Detected Not Detected   RAPID INFLUENZA A/B ANTIGENS    Specimen: Nasopharyngeal   Result Value Ref Range    Influenza A by PCR Not Detected Not Detected    Influenza B by PCR Not Detected Not Detected   CBC with Auto Differential   Result Value Ref Range    WBC 3.2 (L) 4.5 - 11.5 E9/L    RBC 4.54 3.50 - 5.50 E12/L    Hemoglobin 11.0 (L) 11.5 - 15.5 g/dL    Hematocrit 36.9 34.0 - 48.0 %    MCV 81.3 80.0 - 99.9 fL    MCH 24.2 (L) 26.0 - 35.0 pg    MCHC 29.8 (L) 32.0 - 34.5 %    RDW 18.2 (H) 11.5 - 15.0 fL    Platelets 41 (L) 736 - 450 E9/L    MPV 10.1 7.0 - 12.0 fL    Neutrophils % 75.7 43.0 - 80.0 %    Lymphocytes % 18.3 (L) 20.0 - 42.0 %    Monocytes % 4.4 2.0 - 12.0 %    Eosinophils % 5.2 0.0 - 6.0 %    Basophils % 0.9 0.0 - 2.0 %    Neutrophils Absolute 2.43 1.80 - 7.30 E9/L    Lymphocytes Absolute 0.58 (L) 1.50 - 4.00 E9/L    Monocytes Absolute 0.00 (L) 0.10 - 0.95 E9/L    Eosinophils Absolute 0.17 0.05 - 0.50 E9/L    Basophils Absolute 0.03 0.00 - 0.20 E9/L    Anisocytosis 1+     Poikilocytes 2+     Schistocytes 1+     Acanthocytes 1+     [place] Cells 1+     Ovalocytes 2+    Troponin   Result Value Ref Range    Troponin, High Sensitivity 43 (H) 0 - 9 ng/L   Brain Natriuretic Peptide   Result Value Ref Range    Pro-BNP 1,297 (H) 0 - 450 pg/mL   Magnesium   Result Value Ref Range    Magnesium 2.1 1.6 - 2.6 mg/dL   Phosphorus   Result Value Ref Range    Phosphorus 2.5 2.5 - 4.5 mg/dL   D-Dimer, Quantitative   Result Value Ref Range    D-Dimer, Quant >5250 ng/mL DDU   Comprehensive Metabolic Panel   Result Value Ref Range    Sodium 136 132 - 146 mmol/L    Potassium 5.0 3.5 - 5.0 mmol/L    Chloride 104 98 - 107 mmol/L    CO2 22 22 - 29 mmol/L    Anion Gap 10 7 - 16 mmol/L    Glucose 154 (H) 74 - 99 mg/dL    BUN 53 (H) 6 - 23 mg/dL    CREATININE 1.3 (H) 0.5 - 1.0 mg/dL    GFR Non-African American 39 >=60 mL/min/1.73    GFR African American 47     Calcium 10.1 8.6 - 10.2 mg/dL    Total Protein 7.4 6.4 - 8.3 g/dL    Albumin 3.4 (L) 3.5 - 5.2 g/dL    Total Bilirubin 0.3 0.0 - 1.2 mg/dL    Alkaline Phosphatase 114 (H) 35 - 104 U/L    ALT 17 0 - 32 U/L    AST 22 0 - 31 U/L   Platelet Confirmation   Result Value Ref Range    Platelet Confirmation CONFIRMED    Troponin   Result Value Ref Range    Troponin, High Sensitivity 44 (H) 0 - 9 ng/L   Lactate, Sepsis   Result Value Ref Range    Lactic Acid, Sepsis 1.2 0.5 - 1.9 mmol/L   APTT   Result Value Ref Range    aPTT 26.1 24.5 - 35.1 sec   CBC with Auto Differential   Result Value Ref Range    WBC 3.2 (L) 4.5 - 11.5 E9/L    RBC 3.63 3.50 - 5.50 E12/L    Hemoglobin 9.1 (L) 11.5 - 15.5 g/dL    Hematocrit 29.4 (L) 34.0 - 48.0 %    MCV 81.0 80.0 - 99.9 fL    MCH 25.1 (L) 26.0 - 35.0 pg    MCHC 31.0 (L) 32.0 - 34.5 %    RDW 18.1 (H) 11.5 - 15.0 fL    Platelets 31 (L) 872 - 450 E9/L    MPV 10.6 7.0 - 12.0 fL    Neutrophils % 78.9 43.0 - 80.0 %    Lymphocytes % 19.3 (L) 20.0 - 42.0 %    Monocytes % 1.8 (L) 2.0 - 12.0 %    Eosinophils % 1.5 0.0 - 6.0 %    Basophils % 0.6 0.0 - 2.0 %    Neutrophils Absolute 2.53 1.80 - 7.30 E9/L    Lymphocytes Absolute 0.61 (L) 1.50 - 4.00 E9/L    Monocytes Absolute 0.06 (L) 0.10 - 0.95 E9/L    Eosinophils Absolute 0.00 (L) 0.05 - 0.50 E9/L    Basophils Absolute 0.00 0.00 - 0.20 E9/L    Anisocytosis 1+     Poikilocytes 1+     Ovalocytes 1+    Phosphorus   Result Value Ref Range    Phosphorus 2.5 2.5 - 4.5 mg/dL   Magnesium Result Value Ref Range    Magnesium 2.0 1.6 - 2.6 mg/dL   TSH   Result Value Ref Range    TSH 2.120 0.270 - 4.200 uIU/mL   Procalcitonin   Result Value Ref Range    Procalcitonin 0.16 (H) 0.00 - 0.08 ng/mL   Urinalysis with Microscopic   Result Value Ref Range    Color, UA Yellow Straw/Yellow    Clarity, UA CLOUDY (A) Clear    Glucose, Ur >=1000 (A) Negative mg/dL    Bilirubin Urine Negative Negative    Ketones, Urine Negative Negative mg/dL    Specific Gravity, UA 1.010 1.005 - 1.030    Blood, Urine MODERATE (A) Negative    pH, UA 5.0 5.0 - 9.0    Protein, UA Negative Negative mg/dL    Urobilinogen, Urine 0.2 <2.0 E.U./dL    Nitrite, Urine Negative Negative    Leukocyte Esterase, Urine MODERATE (A) Negative    WBC, UA >20 (A) 0 - 5 /HPF    RBC, UA 2-5 0 - 2 /HPF    Epithelial Cells, UA RARE /HPF    Bacteria, UA RARE (A) None Seen /HPF    Yeast, UA Present (A) None Seen /HPF   Platelet Confirmation   Result Value Ref Range    Platelet Confirmation CONFIRMED    Platelet Count   Result Value Ref Range    Platelets 24 (L) 343 - 450 E9/L   Platelet Confirmation   Result Value Ref Range    Platelet Confirmation CONFIRMED    POCT Glucose   Result Value Ref Range    Glucose 149 mg/dL    QC OK?  yes    POCT Glucose   Result Value Ref Range    Meter Glucose 149 (H) 74 - 99 mg/dL   POCT Glucose   Result Value Ref Range    Meter Glucose 204 (H) 74 - 99 mg/dL   POCT Glucose   Result Value Ref Range    Meter Glucose 120 (H) 74 - 99 mg/dL   POCT Glucose   Result Value Ref Range    Meter Glucose 128 (H) 74 - 99 mg/dL   POCT Glucose   Result Value Ref Range    Meter Glucose 115 (H) 74 - 99 mg/dL   POCT Glucose   Result Value Ref Range    Meter Glucose 156 (H) 74 - 99 mg/dL   EKG 12 Lead   Result Value Ref Range    Ventricular Rate 83 BPM    Atrial Rate 83 BPM    P-R Interval 196 ms    QRS Duration 86 ms    Q-T Interval 362 ms    QTc Calculation (Bazett) 425 ms    P Axis 10 degrees    R Axis 58 degrees    T Axis -31 degrees   TYPE AND SCREEN   Result Value Ref Range    ABO/Rh O NEG     Antibody Screen NEG    PREPARE PLATELETS, 1 Product   Result Value Ref Range    Product Code Blood Bank [id number]     Description Blood Bank      Unit Number [id number]     Dispense Status Blood Bank selected     Product Code Blood Bank [id number]     Description Blood Bank      Unit Number [id number]     Dispense Status Blood Bank selected        RADIOLOGY:  US DUP LOWER EXTREMITIES BILATERAL VENOUS   Final Result   Bilateral extensive DVT with predominant occlusive thrombus as above   commented. .. RECOMMENDATIONS:   Unavailable         CTA PULMONARY W CONTRAST   Final Result   Nonocclusive thrombus involving pulmonary artery branch vessels to the left   upper lobe, distal left main pulmonary artery and pulmonary artery branch   vessels to the right lower lobe. 5.0 cm x 2.0 cm right upper lobe mass with probable adjacent lymphatic spread. Large right pleural effusion. 5.0 cm x 4.5 cm ill-defined mass involving the posterior right lobe of the   liver consistent with metastatic disease. The ascending aorta measures up to 3.9 cm in stable compared to prior study. Findings discussed on 04/23/2022 with Dr. Alirio Kee at 8:36 p.m. XR CHEST PORTABLE   Final Result   Redemonstration of opacities throughout the right lung suggesting pneumonia   or interstitial pulmonary edema. Opacities have increased in right lung base   which could indicate increasing right pleural effusion. IR Interventional Radiology Procedure Request    (Results Pending)       EKG: This EKG is signed and interpreted by me. Rate: 83  Rhythm: Sinus  Interpretation: Normal sinus rhythm. No ST elevations. No ST depressions. Normal intervals.   Comparison: stable as compared to patient's most recent EKG      ------------------------- NURSING NOTES AND VITALS REVIEWED ---------------------------  Date / Time Roomed:  4/23/2022  4:47 PM  ED Bed Assignment:  [id number]    The nursing notes within the ED encounter and vital signs as below have been reviewed. Patient Vitals for the past 24 hrs:   BP Temp Temp src Pulse Resp SpO2   04/24/22 2015 (!) 149/67 98.1 °F (36.7 °C) Oral 93 20 93 %   04/24/22 1834     25    04/24/22 1535     (!) 36    04/24/22 1015 (!) 105/56 97.9 °F (36.6 °C) Oral 87 28 95 %   04/24/22 0400 (!) 108/54 97.8 °F (36.6 °C) Oral 96 22 96 %       Oxygen Saturation Interpretation: Normal    ------------------------------------------ PROGRESS NOTES ------------------------------------------  Re-evaluation(s):  Time: 2200  Patients symptoms show no change  Repeat physical examination is not changed    Counseling:  I have spoken with the patient and discussed todays results, in addition to providing specific details for the plan of care and counseling regarding the diagnosis and prognosis. Their questions are answered at this time and they are agreeable with the plan of admission.    --------------------------------- ADDITIONAL PROVIDER NOTES ---------------------------------  Consultations:  Time: 2206. Spoke with Dr. Zhanna Ruelas. Discussed case. They will admit the patient. This patient's ED course included: a personal history and physicial examination, re-evaluation prior to disposition, multiple bedside re-evaluations, IV medications, cardiac monitoring, continuous pulse oximetry and complex medical decision making and emergency management    This patient has remained hemodynamically stable during their ED course. Diagnosis:  1. Encounter for assessment for deep vein thrombosis (DVT)        Disposition:  Patient's disposition: Admit to med/surg floor  Patient's condition is stable.            Lolis Stewart DO  Resident  04/25/22 1008

## 2022-04-24 ENCOUNTER — APPOINTMENT (OUTPATIENT)
Dept: ULTRASOUND IMAGING | Age: 87
DRG: 871 | End: 2022-04-24
Payer: MEDICARE

## 2022-04-24 LAB
ABO/RH: NORMAL
ANISOCYTOSIS: ABNORMAL
ANTIBODY SCREEN: NORMAL
BACTERIA: ABNORMAL /HPF
BASOPHILS ABSOLUTE: 0 E9/L (ref 0–0.2)
BASOPHILS RELATIVE PERCENT: 0.6 % (ref 0–2)
BILIRUBIN URINE: NEGATIVE
BLOOD, URINE: ABNORMAL
CLARITY: ABNORMAL
COLOR: YELLOW
EKG ATRIAL RATE: 83 BPM
EKG P AXIS: 10 DEGREES
EKG P-R INTERVAL: 196 MS
EKG Q-T INTERVAL: 362 MS
EKG QRS DURATION: 86 MS
EKG QTC CALCULATION (BAZETT): 425 MS
EKG R AXIS: 58 DEGREES
EKG T AXIS: -31 DEGREES
EKG VENTRICULAR RATE: 83 BPM
EOSINOPHILS ABSOLUTE: 0 E9/L (ref 0.05–0.5)
EOSINOPHILS RELATIVE PERCENT: 1.5 % (ref 0–6)
EPITHELIAL CELLS, UA: ABNORMAL /HPF
GLUCOSE URINE: >=1000 MG/DL
HCT VFR BLD CALC: 29.4 % (ref 34–48)
HEMOGLOBIN: 9.1 G/DL (ref 11.5–15.5)
KETONES, URINE: NEGATIVE MG/DL
LEUKOCYTE ESTERASE, URINE: ABNORMAL
LYMPHOCYTES ABSOLUTE: 0.61 E9/L (ref 1.5–4)
LYMPHOCYTES RELATIVE PERCENT: 19.3 % (ref 20–42)
MAGNESIUM: 2 MG/DL (ref 1.6–2.6)
MCH RBC QN AUTO: 25.1 PG (ref 26–35)
MCHC RBC AUTO-ENTMCNC: 31 % (ref 32–34.5)
MCV RBC AUTO: 81 FL (ref 80–99.9)
METER GLUCOSE: 115 MG/DL (ref 74–99)
METER GLUCOSE: 120 MG/DL (ref 74–99)
METER GLUCOSE: 128 MG/DL (ref 74–99)
METER GLUCOSE: 156 MG/DL (ref 74–99)
METER GLUCOSE: 204 MG/DL (ref 74–99)
MONOCYTES ABSOLUTE: 0.06 E9/L (ref 0.1–0.95)
MONOCYTES RELATIVE PERCENT: 1.8 % (ref 2–12)
NEUTROPHILS ABSOLUTE: 2.53 E9/L (ref 1.8–7.3)
NEUTROPHILS RELATIVE PERCENT: 78.9 % (ref 43–80)
NITRITE, URINE: NEGATIVE
OVALOCYTES: ABNORMAL
PDW BLD-RTO: 18.1 FL (ref 11.5–15)
PH UA: 5 (ref 5–9)
PHOSPHORUS: 2.5 MG/DL (ref 2.5–4.5)
PLATELET # BLD: 31 E9/L (ref 130–450)
PLATELET CONFIRMATION: NORMAL
PMV BLD AUTO: 10.6 FL (ref 7–12)
POIKILOCYTES: ABNORMAL
PROCALCITONIN: 0.16 NG/ML (ref 0–0.08)
PROTEIN UA: NEGATIVE MG/DL
RBC # BLD: 3.63 E12/L (ref 3.5–5.5)
RBC UA: ABNORMAL /HPF (ref 0–2)
SPECIFIC GRAVITY UA: 1.01 (ref 1–1.03)
TSH SERPL DL<=0.05 MIU/L-ACNC: 2.12 UIU/ML (ref 0.27–4.2)
UROBILINOGEN, URINE: 0.2 E.U./DL
WBC # BLD: 3.2 E9/L (ref 4.5–11.5)
WBC UA: >20 /HPF (ref 0–5)
YEAST: PRESENT /HPF

## 2022-04-24 PROCEDURE — 86850 RBC ANTIBODY SCREEN: CPT

## 2022-04-24 PROCEDURE — 36415 COLL VENOUS BLD VENIPUNCTURE: CPT

## 2022-04-24 PROCEDURE — 81001 URINALYSIS AUTO W/SCOPE: CPT

## 2022-04-24 PROCEDURE — 84443 ASSAY THYROID STIM HORMONE: CPT

## 2022-04-24 PROCEDURE — 84145 PROCALCITONIN (PCT): CPT

## 2022-04-24 PROCEDURE — 0W993ZZ DRAINAGE OF RIGHT PLEURAL CAVITY, PERCUTANEOUS APPROACH: ICD-10-PCS | Performed by: INTERNAL MEDICINE

## 2022-04-24 PROCEDURE — 82962 GLUCOSE BLOOD TEST: CPT

## 2022-04-24 PROCEDURE — 85049 AUTOMATED PLATELET COUNT: CPT

## 2022-04-24 PROCEDURE — 2580000003 HC RX 258: Performed by: INTERNAL MEDICINE

## 2022-04-24 PROCEDURE — 6360000002 HC RX W HCPCS

## 2022-04-24 PROCEDURE — 86900 BLOOD TYPING SEROLOGIC ABO: CPT

## 2022-04-24 PROCEDURE — 93970 EXTREMITY STUDY: CPT

## 2022-04-24 PROCEDURE — 83735 ASSAY OF MAGNESIUM: CPT

## 2022-04-24 PROCEDURE — 6370000000 HC RX 637 (ALT 250 FOR IP): Performed by: INTERNAL MEDICINE

## 2022-04-24 PROCEDURE — 1200000000 HC SEMI PRIVATE

## 2022-04-24 PROCEDURE — 99222 1ST HOSP IP/OBS MODERATE 55: CPT

## 2022-04-24 PROCEDURE — 86901 BLOOD TYPING SEROLOGIC RH(D): CPT

## 2022-04-24 PROCEDURE — C9113 INJ PANTOPRAZOLE SODIUM, VIA: HCPCS | Performed by: INTERNAL MEDICINE

## 2022-04-24 PROCEDURE — 87088 URINE BACTERIA CULTURE: CPT

## 2022-04-24 PROCEDURE — 6360000002 HC RX W HCPCS: Performed by: INTERNAL MEDICINE

## 2022-04-24 PROCEDURE — P9047 ALBUMIN (HUMAN), 25%, 50ML: HCPCS | Performed by: INTERNAL MEDICINE

## 2022-04-24 PROCEDURE — 84100 ASSAY OF PHOSPHORUS: CPT

## 2022-04-24 PROCEDURE — 85025 COMPLETE CBC W/AUTO DIFF WBC: CPT

## 2022-04-24 RX ORDER — GLYCOPYRROLATE 0.2 MG/ML
0.2 INJECTION INTRAMUSCULAR; INTRAVENOUS EVERY 4 HOURS PRN
Status: DISCONTINUED | OUTPATIENT
Start: 2022-04-24 | End: 2022-04-26 | Stop reason: HOSPADM

## 2022-04-24 RX ORDER — ENOXAPARIN SODIUM 100 MG/ML
1 INJECTION SUBCUTANEOUS DAILY
Status: DISCONTINUED | OUTPATIENT
Start: 2022-04-24 | End: 2022-04-24 | Stop reason: SDUPTHER

## 2022-04-24 RX ORDER — ENOXAPARIN SODIUM 100 MG/ML
1 INJECTION SUBCUTANEOUS NIGHTLY
Status: DISCONTINUED | OUTPATIENT
Start: 2022-04-24 | End: 2022-04-26 | Stop reason: HOSPADM

## 2022-04-24 RX ORDER — LORAZEPAM 1 MG/1
1 TABLET ORAL EVERY 6 HOURS PRN
Status: DISCONTINUED | OUTPATIENT
Start: 2022-04-24 | End: 2022-04-26 | Stop reason: HOSPADM

## 2022-04-24 RX ORDER — MORPHINE SULFATE 2 MG/ML
1 INJECTION, SOLUTION INTRAMUSCULAR; INTRAVENOUS
Status: DISCONTINUED | OUTPATIENT
Start: 2022-04-24 | End: 2022-04-25

## 2022-04-24 RX ORDER — MORPHINE SULFATE 2 MG/ML
2 INJECTION, SOLUTION INTRAMUSCULAR; INTRAVENOUS EVERY 4 HOURS PRN
Status: DISCONTINUED | OUTPATIENT
Start: 2022-04-24 | End: 2022-04-24

## 2022-04-24 RX ORDER — ACETAMINOPHEN 325 MG/1
650 TABLET ORAL SEE ADMIN INSTRUCTIONS
Status: DISCONTINUED | OUTPATIENT
Start: 2022-04-24 | End: 2022-04-24

## 2022-04-24 RX ORDER — INSULIN LISPRO 100 [IU]/ML
0-6 INJECTION, SOLUTION INTRAVENOUS; SUBCUTANEOUS
Status: DISCONTINUED | OUTPATIENT
Start: 2022-04-24 | End: 2022-04-26 | Stop reason: HOSPADM

## 2022-04-24 RX ORDER — SODIUM CHLORIDE 9 MG/ML
INJECTION, SOLUTION INTRAVENOUS PRN
Status: DISCONTINUED | OUTPATIENT
Start: 2022-04-24 | End: 2022-04-24

## 2022-04-24 RX ORDER — MORPHINE SULFATE 2 MG/ML
2 INJECTION, SOLUTION INTRAMUSCULAR; INTRAVENOUS
Status: DISCONTINUED | OUTPATIENT
Start: 2022-04-24 | End: 2022-04-25

## 2022-04-24 RX ORDER — ALBUMIN (HUMAN) 12.5 G/50ML
25 SOLUTION INTRAVENOUS ONCE
Status: COMPLETED | OUTPATIENT
Start: 2022-04-24 | End: 2022-04-24

## 2022-04-24 RX ORDER — INSULIN LISPRO 100 [IU]/ML
0-3 INJECTION, SOLUTION INTRAVENOUS; SUBCUTANEOUS NIGHTLY
Status: DISCONTINUED | OUTPATIENT
Start: 2022-04-24 | End: 2022-04-26 | Stop reason: HOSPADM

## 2022-04-24 RX ORDER — PANTOPRAZOLE SODIUM 40 MG/10ML
40 INJECTION, POWDER, LYOPHILIZED, FOR SOLUTION INTRAVENOUS 2 TIMES DAILY
Status: DISCONTINUED | OUTPATIENT
Start: 2022-04-24 | End: 2022-04-26 | Stop reason: HOSPADM

## 2022-04-24 RX ADMIN — PANTOPRAZOLE SODIUM 40 MG: 40 INJECTION, POWDER, FOR SOLUTION INTRAVENOUS at 20:29

## 2022-04-24 RX ADMIN — BUSPIRONE HYDROCHLORIDE 5 MG: 10 TABLET ORAL at 00:57

## 2022-04-24 RX ADMIN — SENNOSIDES AND DOCUSATE SODIUM 1 TABLET: 50; 8.6 TABLET ORAL at 20:25

## 2022-04-24 RX ADMIN — METOPROLOL TARTRATE 25 MG: 25 TABLET, FILM COATED ORAL at 10:28

## 2022-04-24 RX ADMIN — PANTOPRAZOLE SODIUM 40 MG: 40 INJECTION, POWDER, FOR SOLUTION INTRAVENOUS at 02:50

## 2022-04-24 RX ADMIN — METOPROLOL TARTRATE 25 MG: 25 TABLET, FILM COATED ORAL at 20:24

## 2022-04-24 RX ADMIN — ENOXAPARIN SODIUM 60 MG: 100 INJECTION SUBCUTANEOUS at 20:28

## 2022-04-24 RX ADMIN — BUSPIRONE HYDROCHLORIDE 5 MG: 10 TABLET ORAL at 20:25

## 2022-04-24 RX ADMIN — INSULIN LISPRO 1 UNITS: 100 INJECTION, SOLUTION INTRAVENOUS; SUBCUTANEOUS at 20:29

## 2022-04-24 RX ADMIN — SODIUM CHLORIDE, PRESERVATIVE FREE 10 ML: 5 INJECTION INTRAVENOUS at 10:29

## 2022-04-24 RX ADMIN — SENNOSIDES AND DOCUSATE SODIUM 1 TABLET: 50; 8.6 TABLET ORAL at 01:41

## 2022-04-24 RX ADMIN — MORPHINE SULFATE 1 MG: 2 INJECTION, SOLUTION INTRAMUSCULAR; INTRAVENOUS at 15:35

## 2022-04-24 RX ADMIN — INSULIN GLARGINE 20 UNITS: 100 INJECTION, SOLUTION SUBCUTANEOUS at 20:31

## 2022-04-24 RX ADMIN — ALBUMIN (HUMAN) 25 G: 0.25 INJECTION, SOLUTION INTRAVENOUS at 02:49

## 2022-04-24 RX ADMIN — MORPHINE SULFATE 1 MG: 2 INJECTION, SOLUTION INTRAMUSCULAR; INTRAVENOUS at 20:49

## 2022-04-24 RX ADMIN — CEFTRIAXONE SODIUM 1000 MG: 1 INJECTION, POWDER, FOR SOLUTION INTRAMUSCULAR; INTRAVENOUS at 20:29

## 2022-04-24 RX ADMIN — CITALOPRAM HYDROBROMIDE 20 MG: 10 TABLET ORAL at 10:28

## 2022-04-24 RX ADMIN — SENNOSIDES AND DOCUSATE SODIUM 1 TABLET: 50; 8.6 TABLET ORAL at 10:28

## 2022-04-24 RX ADMIN — BUSPIRONE HYDROCHLORIDE 5 MG: 10 TABLET ORAL at 10:29

## 2022-04-24 RX ADMIN — INSULIN GLARGINE 20 UNITS: 100 INJECTION, SOLUTION SUBCUTANEOUS at 01:01

## 2022-04-24 RX ADMIN — MORPHINE SULFATE 1 MG: 2 INJECTION, SOLUTION INTRAMUSCULAR; INTRAVENOUS at 18:34

## 2022-04-24 RX ADMIN — PANTOPRAZOLE SODIUM 40 MG: 40 INJECTION, POWDER, FOR SOLUTION INTRAVENOUS at 10:28

## 2022-04-24 RX ADMIN — SODIUM CHLORIDE, PRESERVATIVE FREE 10 ML: 5 INJECTION INTRAVENOUS at 20:43

## 2022-04-24 ASSESSMENT — PAIN DESCRIPTION - DESCRIPTORS
DESCRIPTORS: DISCOMFORT;SORE
DESCRIPTORS: DISCOMFORT;ACHING;SORE

## 2022-04-24 ASSESSMENT — PAIN SCALES - GENERAL
PAINLEVEL_OUTOF10: 6
PAINLEVEL_OUTOF10: 4
PAINLEVEL_OUTOF10: 0
PAINLEVEL_OUTOF10: 6

## 2022-04-24 ASSESSMENT — PAIN DESCRIPTION - ONSET
ONSET: ON-GOING
ONSET: ON-GOING

## 2022-04-24 ASSESSMENT — PAIN DESCRIPTION - LOCATION
LOCATION: GENERALIZED
LOCATION: GENERALIZED

## 2022-04-24 ASSESSMENT — PAIN - FUNCTIONAL ASSESSMENT
PAIN_FUNCTIONAL_ASSESSMENT: PREVENTS OR INTERFERES SOME ACTIVE ACTIVITIES AND ADLS
PAIN_FUNCTIONAL_ASSESSMENT: PREVENTS OR INTERFERES SOME ACTIVE ACTIVITIES AND ADLS

## 2022-04-24 ASSESSMENT — PAIN DESCRIPTION - FREQUENCY
FREQUENCY: CONTINUOUS
FREQUENCY: CONTINUOUS

## 2022-04-24 NOTE — H&P
Department of Internal Medicine  History and Physical    PCP: NILTON Womack CNP  Admitting Physician: Dr. Antonia Arellano  Consultants:   Date of Service: 4/23/2022    CHIEF COMPLAINT: Shortness of breath    HISTORY OF PRESENT ILLNESS:    Patient is 81-year-old female who presented to the ED from [address] [place] [address] due to shortness of breath. Patient has been short of breath for the last 2 to 3 days she has mostly nonproductive cough. As such she was sent to the ED for further evaluation. Patient does complain of nausea. Denies any abdominal pain or emesis. She does have bilateral lower extremity edema which has been present for the last month. She denies any previous history of DVT or PE. She denies any fever or chills. She denies any chest pain. Patient recently diagnosed with lung cancer a few months ago. She had last had chemotherapy on Friday. PAST MEDICAL Hx:  Past Medical History:   Diagnosis Date    CAD (coronary artery disease)     Cancer ([facility] [address] [facility]     Lung    COPD (chronic obstructive pulmonary disease) ([facility] [address]     early stage    Diabetes mellitus ([facility]     Hyperlipidemia     Hypertension        PAST SURGICAL Hx:   Past Surgical History:   Procedure Laterality Date    ACHILLES TENDON SURGERY Left     CARDIAC SURGERY      COLONOSCOPY      CORONARY ARTERY BYPASS GRAFT  april 2013    EYE SURGERY Right     cataract    HYSTERECTOMY      OTHER SURGICAL HISTORY Right 7 7 14    DeQuervains tendonitis thumb    UPPER GASTROINTESTINAL ENDOSCOPY N/A 8/30/2020    EGD CONTROL HEMORRHAGE performed by Ebony Barrera DO at [address] [facility]  8/30/2020    EGD ESOPHAGOGASTRODUODENOSCOPY SCLEROTHERAPY performed by Ebony Barrera DO at [address] [facility] Hx:  History reviewed. No pertinent family history. HOME MEDICATIONS:  Prior to Admission medications    Medication Sig Start Date End Date Taking?  Authorizing Provider   metoprolol tartrate (LOPRESSOR) 25 MG tablet Take 1 tablet by mouth 2 times daily 9/3/20   Tien [facility], DO   pantoprazole (PROTONIX) 40 MG tablet Take 1 tablet by mouth 2 times daily (before meals) 9/3/20   [facility], DO   busPIRone (BUSPAR) 5 MG tablet Take 5 mg by mouth 2 times daily    Historical Provider, MD   Heparin Sodium, Porcine, (HEPARIN, PORCINE,) 76822 UNIT/ML injection Inject 0.5 mLs into the skin every 8 hours 20   NILTON Yung CNP   insulin lispro (HUMALOG) 100 UNIT/ML injection vial Inject 0-12 Units into the skin 3 times daily (with meals) 20   NILTON Yung CNP   insulin lispro (HUMALOG) 100 UNIT/ML injection vial Inject 0-6 Units into the skin nightly 20   NILTON Yung CNP   sennosides-docusate sodium (SENOKOT-S) 8.6-50 MG tablet Take 1 tablet by mouth 2 times daily 20   NILTON Yung CNP   acetaminophen (TYLENOL) 325 MG tablet Take 650 mg by mouth every 6 hours as needed for Pain    Historical Provider, MD   insulin glargine (LANTUS) 100 UNIT/ML injection vial Inject 20 Units into the skin nightly. 1/20/15   Maggie Khan MD   citalopram (CELEXA) 20 MG tablet Take 20 mg by mouth daily. Historical Provider, MD       ALLERGIES:  Naproxen, Nsaids, and Vicodin [hydrocodone-acetaminophen]    SOCIAL Hx:  Social History     Socioeconomic History    Marital status:       Spouse name: Not on file    Number of children: Not on file    Years of education: Not on file    Highest education level: Not on file   Occupational History    Not on file   Tobacco Use    Smoking status: Former Smoker     Years: 30.00     Quit date: 4/3/2013     Years since quittin.0    Smokeless tobacco: Never Used   Vaping Use    Vaping Use: Never used   Substance and Sexual Activity    Alcohol use: No    Drug use: Never    Sexual activity: Not Currently   Other Topics Concern    Not on file   Social History Narrative    Not on file     Social Determinants of Health Financial Resource Strain:     Difficulty of Paying Living Expenses: Not on file   Food Insecurity:     Worried About Running Out of Food in the Last Year: Not on file    Venus of Food in the Last Year: Not on file   Transportation Needs:     Lack of Transportation (Medical): Not on file    Lack of Transportation (Non-Medical): Not on file   Physical Activity:     Days of Exercise per Week: Not on file    Minutes of Exercise per Session: Not on file   Stress:     Feeling of Stress : Not on file   Social Connections:     Frequency of Communication with Friends and Family: Not on file    Frequency of Social Gatherings with Friends and Family: Not on file    Attends Bahai Services: Not on file    Active Member of [address] [place] [address] or Organizations: Not on file    Attends Club or Organization Meetings: Not on file    Marital Status: Not on file   Intimate Partner Violence:     Fear of Current or Ex-Partner: Not on file    Emotionally Abused: Not on file    Physically Abused: Not on file    Sexually Abused: Not on file   Housing Stability:     Unable to Pay for Housing in the Last Year: Not on file    Number of [place] in the Last Year: Not on file    Unstable Housing in the Last Year: Not on file       ROS: Positive in bold  General:   Denies chills, fatigue, fever, malaise, night sweats or weight loss    Psychological:   Denies anxiety, disorientation or hallucinations    ENT:    Denies epistaxis, headaches, vertigo or visual changes    Cardiovascular:   Denies any chest pain, irregular heartbeats, or palpitations. No paroxysmal nocturnal dyspnea. Respiratory:   Denies shortness of breath, coughing, sputum production, hemoptysis, or wheezing. No orthopnea. Gastrointestinal:   Denies nausea, vomiting, diarrhea, or constipation. Denies any abdominal pain. Denies change in bowel habits or stools. Genito-Urinary:    Denies any urgency, frequency, hematuria.   Voiding without difficulty. Musculoskeletal:   Denies joint pain, joint stiffness, joint swelling or muscle pain    Neurology:    Denies any headache or focal neurological deficits. No weakness or paresthesia. Derm:    Denies any rashes, ulcers, or excoriations. Denies bruising. Extremities:   Denies any lower extremity swelling or edema. PHYSICAL EXAM: Abnormal findings noted  VITALS:  Vitals:    04/23/22 1917   BP: 106/60   Pulse: 101   Resp: 24   Temp: 99.9 °F (37.7 °C)   SpO2: 94%         CONSTITUTIONAL:    Awake, alert, cooperative, no apparent distress, and appears stated age    EYES:     EOMI, sclera clear, conjunctiva normal    ENT:    Normocephalic, atraumatic,External ears without lesions. NECK:    Supple, symmetrical, trachea midline, , no JVD    HEMATOLOGIC/LYMPHATICS:    No cervical lymphadenopathy and no supraclavicular lymphadenopathy    LUNGS:    Symmetric. No increased work of breathing, good air exchange, clear to auscultation bilaterally, no wheezes, rhonchi, or rales,   Patient has diminished breath sounds bilaterally    CARDIOVASCULAR:    Normal apical impulse, regular rate and rhythm, normal S1 and S2, no S3 or S4, and no murmur noted    ABDOMEN:    soft, non-distended, non-tender    MUSCULOSKELETAL:    There is no redness, warmth, or swelling of the joints. NEUROLOGIC:    Awake, alert, oriented to name, place and time. SKIN:    No bruising or bleeding. No redness, warmth, or swelling    EXTREMITIES:    Peripheral pulses present. No edema, cyanosis, or swelling.   Patient has bilateral lower extremity edema    LINES/CATHETERS   Gonzalez catheter in place    LABORATORY DATA:  CBC with Differential:    Lab Results   Component Value Date    WBC 3.2 04/23/2022    RBC 4.54 04/23/2022    HGB 11.0 04/23/2022    HCT 36.9 04/23/2022    PLT 41 04/23/2022    MCV 81.3 04/23/2022    MCH 24.2 04/23/2022    MCHC 29.8 04/23/2022    RDW 18.2 04/23/2022    LYMPHOPCT 18.3 04/23/2022    MONOPCT 4.4 04/23/2022    BASOPCT 0.9 04/23/2022    MONOSABS 0.00 04/23/2022    LYMPHSABS 0.58 04/23/2022    EOSABS 0.17 04/23/2022    BASOSABS 0.03 04/23/2022     CMP:    Lab Results   Component Value Date     04/23/2022    K 5.0 04/23/2022    K 4.8 04/19/2022     04/23/2022    CO2 22 04/23/2022    BUN 53 04/23/2022    CREATININE 1.3 04/23/2022    GFRAA 47 04/23/2022    LABGLOM 39 04/23/2022    GLUCOSE 149 04/23/2022    PROT 7.4 04/23/2022    LABALBU 3.4 04/23/2022    CALCIUM 10.1 04/23/2022    BILITOT 0.3 04/23/2022    ALKPHOS 114 04/23/2022    AST 22 04/23/2022    ALT 17 04/23/2022       ASSESSMENT/PLAN:  1. Bilateral PE  2. Right pleural effusion  3. Right hepatic lobe lesion consistent with metastasis   4. Elevated troponin  5. Leukopenia and thrombocytopenia  6. Lung cancer currently undergoing chemotherapy  7. Chronic normocytic anemia   8. Chronic kidney disease  9. Coronary artery disease status post CABG  10. COPD  6. Insulin-dependent diabetes mellitus  12. Hyperlipidemia  13. Hypertension  14. History of tobacco abuse    Patient presented with nausea and shortness of breath. CTA chest revealed bilateral PE. It also showed large right-sided pleural effusion. There was also concern for pneumonia. As such he was treated with antibiotics in the ED. Patient apparently was on heparin at the nursing facility but this was not able to be confirmed. Patient started on Lovenox. Will need to consider reduced dosing, ivc filter, or platelet transfusion. Patient will have right-sided pleural effusion drained likely secondary to lung cancer. Hematology oncology will be consulted in the setting of metastatic lung cancer and PE. Bilateral lower extremity ultrasound will be obtained to evaluate for DVT. Echocardiogram ordered to evaluate for pulmonary hypertension and right heart strain.     Corbin José  10:27 PM  4/23/2022    Electronically signed by Farhan Muniz DO on 4/23/22 at 10:27 PM EDT

## 2022-04-24 NOTE — CONSULTS
Palliative Care Department  [phone]  Palliative Care Initial Consult  Provider NILTON Hanna CNP      PATIENT: Mahendra Hdez  : 1935  MRN: [id number]  ADMISSION DATE: 2022  4:47 PM  Referring Provider: NILTON Alston CNP    Palliative Medicine was consulted on hospital day 1 for assistance with Goals of care, Code Status Discussion, Family support    HPI:     Clinical Summary:Chiquita Carlson is a 81 y.o. y/o female with a history of lung cancer with metastases to bone and liver who [facility] to [facility] on 2022 with nausea and increased shortness of breath. Patient has expressed wishes of not wanting to continue with chemotherapy, has spoken with oncology, CODE STATUS was already addressed to DNR CC. Palliative medicine consulted to discuss goal of care, CODE STATUS discussion, the family support. ASSESSMENT/PLAN:     Pertinent Hospital Diagnoses      Bilateral pulmonary embolism   Shortness of breath    Palliative Care Encounter / Counseling Regarding Goals of Care  Please see detailed goals of care discussion as below   At this time, Mahendra Hdez, Does have capacity for medical decision-making. Capacity is time limited and situation/question specific   During encounter Chiquita and  was surrogate medical decision-maker   Outcome of goals of care meeting:  o Patient wishes to pursue comfort care focus  o Hospice consult placed  o Comfort medications placed   Code status DNR-CC   Advanced Directives: no POA or living will in epic   Surrogate/Legal NOK:  Jayde Meraz ([phone]    Spiritual assessment: no spiritual distress identified  Bereavement and grief: to be determined  Referrals to: Hospice    Thank you for the opportunity to participate in the care of Mahendra Hdez.      NILTON Hinojosa CNP  Palliative Medicine     SUBJECTIVE:     Details of Conversation:    Met with patient and granddaughters at the bedside. Introduced palliative medicine. We discussed Patient's progressive medical decline. Patient had decided to not pursue aggressive treatment, such as chemotherapy, today she refused platelets for platelet levels of 65,558. She \" I am ready to see my  and my dog in he[place], I just want to be kept comfortable until then\". CODE STATUS was changed to DNR CC this morning. Patient lived in an assisted living, she is going to be placed in a nursing home, she wishes for hospice to follow her there. Hospice consult placed, for case management to arrange choice hospice, once pre-CERT to a facility has been established. Comfort medicationsplaced. Answer all the question. We will continue to follow. Prognosis: Poor    OBJECTIVE:     BP (!) 105/56   Pulse 87   Temp 97.9 °F (36.6 °C) (Oral)   Resp 24   Ht 5' 6\" (1.676 m)   Wt 132 lb (59.9 kg)   SpO2 95%   BMI 21.31 kg/m²     Physical Examination:  Gen: elderly, thin, NAD, awake, alert   HEENT: normocephalic, atraumatic, PERRL, EOMI,   Neck: trachea midline, no JVD  Lungs: Tachypneic, complaining of shortness of breath  Heart: regular rate and rhythm, distant heart tones,   Abdomen: normoactive bowel sounds, soft, non-tender  Extremities: edema  Skin: warm, , bruising  Neuro: awake, alert, oriented x 3, follows commands, no gross neurologic deficit    Objective data reviewed: labs, images, records, medication use, vitals and chart    Time/Communication  Greater than 50% of time spent, total 50 minutes in counseling and coordination of care at the bedside regarding CODE STATUS discussion, family support and goals of care. Thank you for allowing Palliative Medicine to participate in the care of Dina Cuellar. Note: This report was completed using computerHackerEarth voiced recognition software. Every effort has been made to ensure accuracy; however, inadvertent computerized transcription errors may be present.

## 2022-04-24 NOTE — CARE COORDINATION
4-24- note: ( covid neg 4-23) met with pt at the bedside pt is alert and oriented, she is here from [address] [place] [address], [address] living, she doesn't feel like she will be strong enough to return [place] , pt asked for a referral to [address] [place] [address], however they are not in network with her Mando Carballo, CM left a list of facilities in network, she will look over them and get back with CM/SS for decision. will need PT/OT evaluations , Precert for SNF required.  Electronically signed by Juanita Linares RN on 4/24/2022 at 11:24 AM

## 2022-04-24 NOTE — PROGRESS NOTES
S: Patient states has been SOB for awhile and unchanged, denies bleeding or nausea or pain. SHe states she does not want any further chemotherapy for her lung cancer. O: Patient alone, no family at bedside  Temp 97.8 P-96 R-22 Bp108/54  Gen- alert, sitting up in bed, mild dyspnea at rest, wearing oxygen per NC sat is 96%   HEENT-mmm  Lungs- decreased lower right lung  Cardiac- irregular  Abd- soft/nt/n d+BS  Ext- warm and well perfused    Labs:  Wbc=3.2 hb=9.1 plt=31,000 with 78% segs creatinine 1.3  4-15-22 (pre-chem) hb=12    CTA chest 4-23-22  Nonocclusive thrombus involving pulmonary artery branch vessels to the left   upper lobe, distal left main pulmonary artery and pulmonary artery branch   vessels to the right lower lobe.       5.0 cm x 2.0 cm right upper lobe mass with probable adjacent lymphatic spread.       Large right pleural effusion.       5.0 cm x 4.5 cm ill-defined mass involving the posterior right lobe of the   liver consistent with metastatic disease. A/P: 81year old female known to Dr. 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 She had her first cycle of carbo-alimta-keytruda 7-33-94 complicated by pancytopenia and admitted with bilateral PE and known prior right leg DVT. 1. PE/ right legDVT- she is s/p IVC filter in February. She was not anticoagulated at that time due to GI bleed with Hb of 5.8 and with EGD/colonoscopy not identifying source. She now has thrombocytopenia from chemotherapy so lovenox has only been ordered once a day. I will give platelet transfusion today with goal of platelets 05,442 or greater. 2. Stage IV lung cancer- patient does not want any further chemotherapy. She does not want intubated/cardioverted/etc and agrees to Analisa. I recommended patient speak with hospice.  She currently lives in [place] [address] and will not be able to remain there and care for herself. She mentioned that she may want to try for placement at [facility]. 3. Pancytopenia due to chemotherapy will improve with time. CHeck platelet count later today after transfusion and if less than 50,000 then should order 1 more unit of platelets if going to remain on lovenox. 4. Past hx GI bleed Feb 2022 without source found on EGD/colonoscopy.  Hb was 12 last week, watch for any signs of GI bleeding    Mercy Nguyen MD

## 2022-04-24 NOTE — PROGRESS NOTES
focal neurological deficits, generally weak and deconditioned without focal component  Psch:   Denies being anxious or depressed. Musculoskeletal:    Denies  myalgias, joint complaints or back pain. Integumentary:   Denies any rashes, ulcers, or excoriations. Denies bruising. Hematologic/Lymphatic:  Denies bruising or bleeding. Physical Exam:  I/O this shift:  In: 213.3 [P.O.:120; IV Piggyback:93.3]  Out: 800 [Urine:800]    Intake/Output Summary (Last 24 hours) at 4/24/2022 0637  Last data filed at 4/24/2022 0400  Gross per 24 hour   Intake 213.33 ml   Output 800 ml   Net -586.67 ml   No intake/output data recorded. Patient Vitals for the past 96 hrs (Last 3 readings):   Weight   04/23/22 2345 132 lb (59.9 kg)   04/23/22 2200 130 lb (59 kg)     Vital Signs:   Blood pressure (!) 108/54, pulse 96, temperature 97.8 °F (36.6 °C), temperature source Oral, resp. rate 22, height 5' 6\" (1.676 m), weight 132 lb (59.9 kg), SpO2 96 %. General appearance:  Alert, responsive, oriented to person, place, and time. Acute on chronic ill appearance without distress  Head:  Normocephalic. No masses, lesions or tenderness. Eyes:  PERRLA. EOMI. Sclera clear. Buccal mucosa moist.  ENT:  Ears normal. Mucosa normal.  Neck:    Supple. Trachea midline. No thyromegaly. No JVD. No bruits. Heart:    Rhythm regular. Rate controlled. S1 and S2. Systolic murmur. Lungs:    Symmetrical.  Pattern is mildly tachypneic with some accessory muscle usage. Diminished right greater than the left. No wheezing rales or rhonchi appreciated  Abdomen:   Soft. Non-tender. Non-distended. Bowel sounds positive. No organomegaly or masses. No pain on palpation. Extremities:    Peripheral pulses present. No significant peripheral edema. No ulcers. No cyanosis. No clubbing. Neurologic:    Alert x 3. Generally weak and deconditioned without focal deficit. Cranial nerves grossly intact. No focal weakness.   Psych:   Behavior is normal. Mood appears normal. Speech is not rapid and/or pressured. Musculoskeletal:   No unilateral edema, erythema or warmth. Gait not assessed. Integumentary:  No rashes  Skin normal color and texture. Genitalia/Breast:  Deferred    Medication:  Scheduled Meds:   pantoprazole  40 mg IntraVENous BID    insulin lispro  0-6 Units SubCUTAneous TID WC    insulin lispro  0-3 Units SubCUTAneous Nightly    cefTRIAXone (ROCEPHIN) IV  1,000 mg IntraVENous Q24H    enoxaparin  1 mg/kg SubCUTAneous Daily    ondansetron  4 mg IntraVENous Once    sodium chloride flush  5-40 mL IntraVENous 2 times per day    busPIRone  5 mg Oral BID    citalopram  20 mg Oral Daily    insulin glargine  20 Units SubCUTAneous Nightly    metoprolol tartrate  25 mg Oral BID    sennosides-docusate sodium  1 tablet Oral BID     Continuous Infusions:   dextrose      sodium chloride         Objective Data:  CBC with Differential:    Lab Results   Component Value Date    WBC 3.2 04/24/2022    RBC 3.63 04/24/2022    HGB 9.1 04/24/2022    HCT 29.4 04/24/2022    PLT 31 04/24/2022    MCV 81.0 04/24/2022    MCH 25.1 04/24/2022    MCHC 31.0 04/24/2022    RDW 18.1 04/24/2022    LYMPHOPCT 19.3 04/24/2022    MONOPCT 1.8 04/24/2022    BASOPCT 0.6 04/24/2022    MONOSABS 0.06 04/24/2022    LYMPHSABS 0.61 04/24/2022    EOSABS 0.00 04/24/2022    BASOSABS 0.00 04/24/2022     BMP:    Lab Results   Component Value Date     04/23/2022    K 5.0 04/23/2022    K 4.8 04/19/2022     04/23/2022    CO2 22 04/23/2022    BUN 53 04/23/2022    LABALBU 3.4 04/23/2022    CREATININE 1.3 04/23/2022    CALCIUM 10.1 04/23/2022    GFRAA 47 04/23/2022    LABGLOM 39 04/23/2022    GLUCOSE 149 04/23/2022     Recent Labs     04/23/22  1938   TROPHS 44*         Wound Documentation:   Wound 04/24/22 Buttocks Left;Lateral (Active)   Dressing Status New dressing applied;Clean; Intact;Dry 04/24/22 0438   Wound Cleansed Irrigated with saline 04/24/22 0438   Dressing/Treatment Foam 04/24/22 0438   Wound Length (cm) 1 cm 04/24/22 0438   Wound Width (cm) 0.5 cm 04/24/22 0438   Wound Depth (cm) 0.2 cm 04/24/22 0438   Wound Surface Area (cm^2) 0.5 cm^2 04/24/22 0438   Wound Volume (cm^3) 0.1 cm^3 04/24/22 0438   Drainage Amount None 04/24/22 0438   Number of days: 0       Wound 04/24/22 Buttocks Left;Medial (Active)   Dressing Status New dressing applied 04/24/22 0438   Wound Cleansed Cleansed with saline 04/24/22 0438   Dressing/Treatment Foam 04/24/22 0438   Wound Length (cm) 0.8 cm 04/24/22 0438   Wound Width (cm) 0.4 cm 04/24/22 0438   Wound Depth (cm) 0.1 cm 04/24/22 0438   Wound Surface Area (cm^2) 0.32 cm^2 04/24/22 0438   Wound Volume (cm^3) 0.032 cm^3 04/24/22 0438   Number of days: 0       Assessment:  1. Acute bilateral PE, likely due in part to underlying diffusely malignant process  2. Sepsis (POA) secondary to urinary tract infection   3. Lung cancer with metastasis to the right hepatic lobe and to the sacral spine with what is likely a malignant large right pleural effusion pending formal fluid analysis  4. Elevated troponin, type I vs type II, underlying coronary artery disease and prior coronary artery bypass grafting   5. Pancytopenia, likely due to chemotherapy  6. Chronic kidney disease stage IIIb  7. COPD without excerbation  8. Insulin-dependent diabetes mellitus  9. Hyperlipidemia  10. Hypertension        Plan:   Vlad Howard is an 60-year-old female patient who presented to [address] [place] [address] emergency department shortness of breath and nausea. Extensive work-up was undertaken and multiple issues were placed. She was found to have acute bilateral PE with known underlying lung cancer and diffusely metastatic process. She was started on Lovenox. Echocardiogram will be obtained to assess for right ventricular strain.   Injectable blood thinner medications will be utilized until all invasive procedures have been completed as we anticipate the need for thoracentesis of what is likely a malignant pleural effusion on the right side. Full fluid analysis will be undertaken and, when clinically appropriate, the patient be transition to oral blood thinning medications. She admits that she is no longer interested in receiving chemotherapy for her lung cancer we will ask the oncology/hematology team to follow for their input and further discussion with the patient. Palliative care will also be consulted as well as the patient may be more appropriate for comfort measures. Cardiac enzymes are elevated with known underlying coronary artery disease without any significant change. Echocardiogram will assess for any new regional wall motion abnormalities and medication therapy will be optimized. Chronic morbidities laboratory values and vital signs are being monitored and addressed accordingly. Continue current therapy. See orders for further plan of care. More than 50% of my  time was spent at the bedside counseling/coordinating care with the patient and/or family with face to face contact. This time was spent reviewing notes and laboratory data as well as instructing and counseling the patient. Time I spent with the family or surrogate(s) is included only if the patient was incapable of providing the necessary information or participating in medical decisions. I also discussed the differential diagnosis and all of the proposed management plans with the patient and individuals accompanying the patient. Chiquita requires this high level of physician care and nursing on the IMC/Telemetry unit due the complexity of decision management and chance of rapid decline or death. Continued cardiac monitoring and higher level of nursing are required. I am readily available for any further decision-making and intervention.      Luna Machado, NILTON - CNP  4/24/2022  6:37 AM

## 2022-04-25 ENCOUNTER — APPOINTMENT (OUTPATIENT)
Dept: INTERVENTIONAL RADIOLOGY/VASCULAR | Age: 87
DRG: 871 | End: 2022-04-25
Payer: MEDICARE

## 2022-04-25 ENCOUNTER — APPOINTMENT (OUTPATIENT)
Dept: GENERAL RADIOLOGY | Age: 87
DRG: 871 | End: 2022-04-25
Payer: MEDICARE

## 2022-04-25 LAB
ALBUMIN SERPL-MCNC: 3.2 G/DL (ref 3.5–5.2)
ALP BLD-CCNC: 96 U/L (ref 35–104)
ALT SERPL-CCNC: 16 U/L (ref 0–32)
ANION GAP SERPL CALCULATED.3IONS-SCNC: 9 MMOL/L (ref 7–16)
AST SERPL-CCNC: 21 U/L (ref 0–31)
BASOPHILS ABSOLUTE: 0.01 E9/L (ref 0–0.2)
BASOPHILS RELATIVE PERCENT: 0.3 % (ref 0–2)
BILIRUB SERPL-MCNC: 0.4 MG/DL (ref 0–1.2)
BLOOD BANK DISPENSE STATUS: NORMAL
BLOOD BANK DISPENSE STATUS: NORMAL
BLOOD BANK PRODUCT CODE: NORMAL
BLOOD BANK PRODUCT CODE: NORMAL
BPU ID: NORMAL
BPU ID: NORMAL
BUN BLDV-MCNC: 30 MG/DL (ref 6–23)
CALCIUM SERPL-MCNC: 9.7 MG/DL (ref 8.6–10.2)
CHLORIDE BLD-SCNC: 107 MMOL/L (ref 98–107)
CO2: 23 MMOL/L (ref 22–29)
CREAT SERPL-MCNC: 1 MG/DL (ref 0.5–1)
DESCRIPTION BLOOD BANK: NORMAL
DESCRIPTION BLOOD BANK: NORMAL
EOSINOPHILS ABSOLUTE: 0.02 E9/L (ref 0.05–0.5)
EOSINOPHILS RELATIVE PERCENT: 0.7 % (ref 0–6)
FLUID TYPE: NORMAL
GFR AFRICAN AMERICAN: >60
GFR NON-AFRICAN AMERICAN: 53 ML/MIN/1.73
GLUCOSE BLD-MCNC: 71 MG/DL (ref 74–99)
HCT VFR BLD CALC: 29 % (ref 34–48)
HEMOGLOBIN: 8.8 G/DL (ref 11.5–15.5)
IMMATURE GRANULOCYTES #: 0.02 E9/L
IMMATURE GRANULOCYTES %: 0.7 % (ref 0–5)
LACTATE DEHYDROGENASE: 219 U/L (ref 135–214)
LD, FLUID: 1685 U/L
LV EF: 80 %
LVEF MODALITY: NORMAL
LYMPHOCYTES ABSOLUTE: 0.73 E9/L (ref 1.5–4)
LYMPHOCYTES RELATIVE PERCENT: 24.4 % (ref 20–42)
MCH RBC QN AUTO: 24.6 PG (ref 26–35)
MCHC RBC AUTO-ENTMCNC: 30.3 % (ref 32–34.5)
MCV RBC AUTO: 81 FL (ref 80–99.9)
METER GLUCOSE: 107 MG/DL (ref 74–99)
METER GLUCOSE: 115 MG/DL (ref 74–99)
METER GLUCOSE: 125 MG/DL (ref 74–99)
METER GLUCOSE: 177 MG/DL (ref 74–99)
METER GLUCOSE: 67 MG/DL (ref 74–99)
METER GLUCOSE: 85 MG/DL (ref 74–99)
MONOCYTES ABSOLUTE: 0.16 E9/L (ref 0.1–0.95)
MONOCYTES RELATIVE PERCENT: 5.4 % (ref 2–12)
NEUTROPHILS ABSOLUTE: 2.05 E9/L (ref 1.8–7.3)
NEUTROPHILS RELATIVE PERCENT: 68.5 % (ref 43–80)
PDW BLD-RTO: 17.8 FL (ref 11.5–15)
PLATELET # BLD: 24 E9/L (ref 130–450)
PLATELET # BLD: 24 E9/L (ref 130–450)
PLATELET CONFIRMATION: NORMAL
PLATELET CONFIRMATION: NORMAL
PMV BLD AUTO: 9.7 FL (ref 7–12)
POTASSIUM SERPL-SCNC: 4.1 MMOL/L (ref 3.5–5)
PROTEIN FLUID: 5.1 G/DL
RBC # BLD: 3.58 E12/L (ref 3.5–5.5)
SODIUM BLD-SCNC: 139 MMOL/L (ref 132–146)
TOTAL PROTEIN: 6.2 G/DL (ref 6.4–8.3)
WBC # BLD: 3 E9/L (ref 4.5–11.5)

## 2022-04-25 PROCEDURE — 6360000002 HC RX W HCPCS: Performed by: NURSE PRACTITIONER

## 2022-04-25 PROCEDURE — 6370000000 HC RX 637 (ALT 250 FOR IP): Performed by: INTERNAL MEDICINE

## 2022-04-25 PROCEDURE — 88305 TISSUE EXAM BY PATHOLOGIST: CPT

## 2022-04-25 PROCEDURE — 87205 SMEAR GRAM STAIN: CPT

## 2022-04-25 PROCEDURE — 87070 CULTURE OTHR SPECIMN AEROBIC: CPT

## 2022-04-25 PROCEDURE — 83615 LACTATE (LD) (LDH) ENZYME: CPT

## 2022-04-25 PROCEDURE — 2580000003 HC RX 258: Performed by: INTERNAL MEDICINE

## 2022-04-25 PROCEDURE — C9113 INJ PANTOPRAZOLE SODIUM, VIA: HCPCS | Performed by: INTERNAL MEDICINE

## 2022-04-25 PROCEDURE — 80053 COMPREHEN METABOLIC PANEL: CPT

## 2022-04-25 PROCEDURE — 88342 IMHCHEM/IMCYTCHM 1ST ANTB: CPT

## 2022-04-25 PROCEDURE — 88112 CYTOPATH CELL ENHANCE TECH: CPT

## 2022-04-25 PROCEDURE — 82962 GLUCOSE BLOOD TEST: CPT

## 2022-04-25 PROCEDURE — 6360000002 HC RX W HCPCS: Performed by: INTERNAL MEDICINE

## 2022-04-25 PROCEDURE — 93306 TTE W/DOPPLER COMPLETE: CPT

## 2022-04-25 PROCEDURE — 85025 COMPLETE CBC W/AUTO DIFF WBC: CPT

## 2022-04-25 PROCEDURE — 88341 IMHCHEM/IMCYTCHM EA ADD ANTB: CPT

## 2022-04-25 PROCEDURE — 84157 ASSAY OF PROTEIN OTHER: CPT

## 2022-04-25 PROCEDURE — C1729 CATH, DRAINAGE: HCPCS

## 2022-04-25 PROCEDURE — 89051 BODY FLUID CELL COUNT: CPT

## 2022-04-25 PROCEDURE — 71046 X-RAY EXAM CHEST 2 VIEWS: CPT

## 2022-04-25 PROCEDURE — 32555 ASPIRATE PLEURA W/ IMAGING: CPT

## 2022-04-25 PROCEDURE — 1200000000 HC SEMI PRIVATE

## 2022-04-25 PROCEDURE — 36415 COLL VENOUS BLD VENIPUNCTURE: CPT

## 2022-04-25 RX ORDER — MORPHINE SULFATE 2 MG/ML
2 INJECTION, SOLUTION INTRAMUSCULAR; INTRAVENOUS
Status: DISCONTINUED | OUTPATIENT
Start: 2022-04-25 | End: 2022-04-26 | Stop reason: HOSPADM

## 2022-04-25 RX ORDER — MORPHINE SULFATE 2 MG/ML
1 INJECTION, SOLUTION INTRAMUSCULAR; INTRAVENOUS
Status: DISCONTINUED | OUTPATIENT
Start: 2022-04-25 | End: 2022-04-26 | Stop reason: HOSPADM

## 2022-04-25 RX ADMIN — INSULIN LISPRO 1 UNITS: 100 INJECTION, SOLUTION INTRAVENOUS; SUBCUTANEOUS at 22:08

## 2022-04-25 RX ADMIN — SODIUM CHLORIDE, PRESERVATIVE FREE 10 ML: 5 INJECTION INTRAVENOUS at 11:02

## 2022-04-25 RX ADMIN — CITALOPRAM HYDROBROMIDE 20 MG: 10 TABLET ORAL at 11:01

## 2022-04-25 RX ADMIN — BUSPIRONE HYDROCHLORIDE 5 MG: 10 TABLET ORAL at 22:02

## 2022-04-25 RX ADMIN — SODIUM CHLORIDE, PRESERVATIVE FREE 10 ML: 5 INJECTION INTRAVENOUS at 22:05

## 2022-04-25 RX ADMIN — BUSPIRONE HYDROCHLORIDE 5 MG: 10 TABLET ORAL at 11:01

## 2022-04-25 RX ADMIN — SENNOSIDES AND DOCUSATE SODIUM 1 TABLET: 50; 8.6 TABLET ORAL at 11:01

## 2022-04-25 RX ADMIN — SENNOSIDES AND DOCUSATE SODIUM 1 TABLET: 50; 8.6 TABLET ORAL at 22:02

## 2022-04-25 RX ADMIN — PANTOPRAZOLE SODIUM 40 MG: 40 INJECTION, POWDER, FOR SOLUTION INTRAVENOUS at 22:04

## 2022-04-25 RX ADMIN — PANTOPRAZOLE SODIUM 40 MG: 40 INJECTION, POWDER, FOR SOLUTION INTRAVENOUS at 11:02

## 2022-04-25 RX ADMIN — CEFTRIAXONE SODIUM 1000 MG: 1 INJECTION, POWDER, FOR SOLUTION INTRAMUSCULAR; INTRAVENOUS at 22:05

## 2022-04-25 RX ADMIN — MORPHINE SULFATE 1 MG: 2 INJECTION, SOLUTION INTRAMUSCULAR; INTRAVENOUS at 11:02

## 2022-04-25 RX ADMIN — INSULIN GLARGINE 20 UNITS: 100 INJECTION, SOLUTION SUBCUTANEOUS at 22:09

## 2022-04-25 RX ADMIN — MORPHINE SULFATE 2 MG: 2 INJECTION, SOLUTION INTRAMUSCULAR; INTRAVENOUS at 13:01

## 2022-04-25 RX ADMIN — METOPROLOL TARTRATE 25 MG: 25 TABLET, FILM COATED ORAL at 11:01

## 2022-04-25 ASSESSMENT — ENCOUNTER SYMPTOMS
SHORTNESS OF BREATH: 1
WHEEZING: 0
SORE THROAT: 0
ABDOMINAL PAIN: 0
VOMITING: 0
NAUSEA: 1
TROUBLE SWALLOWING: 0
SINUS PAIN: 0
EYE PAIN: 0
COUGH: 0
BACK PAIN: 0
DIARRHEA: 0
RHINORRHEA: 0

## 2022-04-25 NOTE — CARE COORDINATION
Ss note:4/25/2022.12:49 PM Neg covid on 4-23. Consult noted for Hospice. Sw met with pt; her son Dax Alba present. Nurse present and pt is requesting pain medication. SW updated pt that per Patricia at [facility] pt CAN return to facility with hospice services. Pt states she prefers to return to [facility] at discharge and she gave permission for sw to contact her granddtr Spencer Magana [phone] whom is her POA and whom called in earlier today- for discharge planning. Sw spoke with Delmy Alvares, relayed pt CAN return to [facility] with Hospice services, choices offered. Sw relayed to Delmy Alvares that [facility] has used [facility] and family prefers this hospice agency. Hospice order noted, referral called and faxed to [facility] with [facility]. Will await liaison to follow up with pt/dtr Delmy Alvares and this sw to co ordinate services for pt to return to [facility].  CHIKA Godinez

## 2022-04-25 NOTE — PROGRESS NOTES
Chart reviewed. Discussed with case management/ and patient will be discharged with hospice services. She has been provided comfort medication and recommendations to undergo thoracentesis remain for symptomatic relief.      Sarthak Cano PA-C

## 2022-04-25 NOTE — CARE COORDINATION
Ss note:4/25/2022.3:36 PM [facility] was consulted. Tamika Carlson met with pt and spoke to [facility]. After some discussion and a call from Pratibha at [facility], the granddaughter relays the plan is for skilled nursing facility. Spoke with  misha Cevallos who relays she and the family are interested in [facility] or [facility] at [place]. Referral made to tamika [facility] who will make contact with the granddtr.  SW will follow up on referral. Sofía Fuller, CHIKA

## 2022-04-25 NOTE — PROGRESS NOTES
Comprehensive Nutrition Assessment    Type and Reason for Visit:  Initial,Positive Nutrition Screen    Nutrition Recommendations/Plan: Glucerna TID     Malnutrition Assessment:  Malnutrition Status: At risk for malnutrition (Comment) (04/25/22 1525)    Context:  Chronic Illness     Findings of the 6 clinical characteristics of malnutrition:  Energy Intake:  75% or less estimated energy requirements for 1 month or longer  Weight Loss:  Unable to assess (no wt hx)     Body Fat Loss:  No significant body fat loss     Muscle Mass Loss:  No significant muscle mass loss    Fluid Accumulation:  No significant fluid accumulation     Strength:  Not Performed    Nutrition Assessment:    Pt admits w/ Bilateral Pulmonary Embolism, noted UTI w/ PMH of CA lung w/ hepatic lobe mets, COPD, DM. Pt at nutritional risk 2/2 poor po intakes, presence of wound. Will start ONS TID. Hospice services is consulted. Nutrition Related Findings:    A/ox4, abd flat/soft, +BS, Bun(H), -I/O -1.5L, BLE +2 edema Wound Type: Open Wounds,Wound Consult Pending ((L) buttocks wound Medial/Lateral)       Current Nutrition Intake & Therapies:    Average Meal Intake: 0%     ADULT DIET; Dysphagia - Soft and Bite Sized; 4 carb choices (60 gm/meal)  ADULT ORAL NUTRITION SUPPLEMENT; Breakfast, Lunch, Dinner; Diabetic Oral Supplement    Anthropometric Measures:  Height: 5' 6\" (167.6 cm)  Ideal Body Weight (IBW): 130 lbs (59 kg)    Current Body Weight: 132 lb (59.9 kg) (4/25 bed), 101.5 % IBW.     Current BMI (kg/m2): 21.3  Usual Body Weight:  (no wt hx)  BMI Categories: Underweight (BMI less than 22) age over 72    Estimated Daily Nutrient Needs:  Energy Requirements Based On: Formula  Weight Used for Energy Requirements: Current  Energy (kcal/day): 5681-0781  Weight Used for Protein Requirements: Current  Protein (g/day): 80-90  Method Used for Fluid Requirements: 1 ml/kcal  Fluid (ml/day): 3722-2936    Nutrition Diagnosis:   · Inadequate oral intake related to impaired respiratory function as evidenced by intake 0-25%,poor intake prior to admission,wounds,BMI,other (comment) (Pt requesting Hospice Services on discharge)      Nutrition Interventions:   Food and/or Nutrient Delivery: Continue Current Diet,Start Oral Nutrition Supplement  Nutrition Education/Counseling: No recommendation at this time  Coordination of Nutrition Care: Continue to monitor while inpatient    Goals:  Goals: other (specify)  Specify Other Goals: Consume >25% meals/ONS    Nutrition Monitoring and Evaluation:   Behavioral-Environmental Outcomes: None Identified  Food/Nutrient Intake Outcomes: Food and Nutrient Intake,Supplement Intake  Physical Signs/Symptoms Outcomes: Biochemical Data,Chewing or Swallowing,GI Status,Fluid Status or Edema,Nutrition Focused Physical Findings,Skin,Weight    Discharge Planning:     Too soon to determine     Niru XANDER Bradshaw, LD  Contact: [phone]

## 2022-04-25 NOTE — PROGRESS NOTES
bruising. Hematologic/Lymphatic:  Denies bruising or bleeding. Physical Exam:  No intake/output data recorded. Intake/Output Summary (Last 24 hours) at 4/25/2022 0748  Last data filed at 4/25/2022 0508  Gross per 24 hour   Intake 10 ml   Output 950 ml   Net -940 ml   I/O last 3 completed shifts: In: 223.3 [P.O.:120; I.V.:10; IV Piggyback:93.3]  Out: 1750 [Urine:1750]  Patient Vitals for the past 96 hrs (Last 3 readings):   Weight   04/23/22 2345 132 lb (59.9 kg)   04/23/22 2200 130 lb (59 kg)     Vital Signs:   Blood pressure 138/66, pulse 82, temperature 98.1 °F (36.7 °C), temperature source Oral, resp. rate 17, height 5' 6\" (1.676 m), weight 132 lb (59.9 kg), SpO2 95 %. General appearance:  Alert, responsive, oriented to person, place, and time. Acute on chronic ill appearance without distress  Head:  Normocephalic. No masses, lesions or tenderness. Eyes:  PERRLA. EOMI. Sclera clear. Buccal mucosa moist.  ENT:  Ears normal. Mucosa normal.  Neck:    Supple. Trachea midline. No thyromegaly. No JVD. No bruits. Heart:    Rhythm regular. Rate controlled. S1 and S2. Systolic murmur. Lungs:    Symmetrical.  Pattern is mildly tachypneic with some accessory muscle usage. Diminished right greater than the left. No wheezing rales or rhonchi appreciated  Abdomen:   Soft. Non-tender. Non-distended. Bowel sounds positive. No organomegaly or masses. No pain on palpation. Extremities:    Peripheral pulses present. No significant peripheral edema. No ulcers. No cyanosis. No clubbing. Neurologic:    Alert x 3. Generally weak and deconditioned without focal deficit. Cranial nerves grossly intact. No focal weakness. Psych:   Behavior is normal. Mood appears normal. Speech is not rapid and/or pressured. Musculoskeletal:   No unilateral edema, erythema or warmth. Gait not assessed. Integumentary:  No rashes  Skin normal color and texture.   Genitalia/Breast:  Deferred    Medication:  Scheduled Meds:   pantoprazole  40 mg IntraVENous BID    insulin lispro  0-6 Units SubCUTAneous TID     insulin lispro  0-3 Units SubCUTAneous Nightly    cefTRIAXone (ROCEPHIN) IV  1,000 mg IntraVENous Q24H    enoxaparin  1 mg/kg SubCUTAneous Nightly    ondansetron  4 mg IntraVENous Once    sodium chloride flush  5-40 mL IntraVENous 2 times per day    busPIRone  5 mg Oral BID    citalopram  20 mg Oral Daily    insulin glargine  20 Units SubCUTAneous Nightly    metoprolol tartrate  25 mg Oral BID    sennosides-docusate sodium  1 tablet Oral BID     Continuous Infusions:   dextrose      sodium chloride         Objective Data:  CBC with Differential:    Lab Results   Component Value Date    WBC 3.2 04/24/2022    RBC 3.63 04/24/2022    HGB 9.1 04/24/2022    HCT 29.4 04/24/2022    PLT 24 04/24/2022    MCV 81.0 04/24/2022    MCH 25.1 04/24/2022    MCHC 31.0 04/24/2022    RDW 18.1 04/24/2022    LYMPHOPCT 19.3 04/24/2022    MONOPCT 1.8 04/24/2022    BASOPCT 0.6 04/24/2022    MONOSABS 0.06 04/24/2022    LYMPHSABS 0.61 04/24/2022    EOSABS 0.00 04/24/2022    BASOSABS 0.00 04/24/2022     BMP:    Lab Results   Component Value Date     04/23/2022    K 5.0 04/23/2022    K 4.8 04/19/2022     04/23/2022    CO2 22 04/23/2022    BUN 53 04/23/2022    LABALBU 3.4 04/23/2022    CREATININE 1.3 04/23/2022    CALCIUM 10.1 04/23/2022    GFRAA 47 04/23/2022    LABGLOM 39 04/23/2022    GLUCOSE 149 04/23/2022     Recent Labs     04/23/22  1938   TROPHS 44*         Wound Documentation:   Wound 04/24/22 Buttocks Left;Lateral (Active)   Dressing Status New dressing applied;Clean; Intact;Dry 04/24/22 0438   Wound Cleansed Irrigated with saline 04/24/22 0438   Dressing/Treatment Foam 04/24/22 0438   Wound Length (cm) 1 cm 04/24/22 0438   Wound Width (cm) 0.5 cm 04/24/22 0438   Wound Depth (cm) 0.2 cm 04/24/22 0438   Wound Surface Area (cm^2) 0.5 cm^2 04/24/22 0438   Wound Volume (cm^3) 0.1 cm^3 04/24/22 0438   Drainage Amount None 04/24/22 8134   Number of days: 0       Wound 04/24/22 Buttocks Left;Medial (Active)   Dressing Status New dressing applied 04/24/22 0438   Wound Cleansed Cleansed with saline 04/24/22 0438   Dressing/Treatment Foam 04/24/22 0438   Wound Length (cm) 0.8 cm 04/24/22 0438   Wound Width (cm) 0.4 cm 04/24/22 0438   Wound Depth (cm) 0.1 cm 04/24/22 0438   Wound Surface Area (cm^2) 0.32 cm^2 04/24/22 0438   Wound Volume (cm^3) 0.032 cm^3 04/24/22 0438   Number of days: 0     Assessment:  1. Acute bilateral PE, likely due in part to underlying diffusely malignant process  2. Sepsis (POA) secondary to urinary tract infection   3. Lung cancer with metastasis to the right hepatic lobe and to the sacral spine with what is likely a malignant large right pleural effusion pending formal fluid analysis  4. Elevated troponin, type I vs type II, underlying coronary artery disease and prior coronary artery bypass grafting   5. Pancytopenia, likely due to chemotherapy  6. Chronic kidney disease stage IIIb  7. COPD without excerbation  8. Insulin-dependent diabetes mellitus  9. Hyperlipidemia  10. Hypertension        Plan:   Suha Henley is uncomfortable. After extensive discussion with our service as well as oncology/hematology and palliative care, she seems to be leaning towards hospice. Hospice will provide consultation today. We will add morphine for pain and air hunger/dyspnea symptoms. She is declining essentially all aggressive treatment. Labs from today are pending to evaluate the degree of thrombocytopenia as we may need to make a decision regarding anticoagulation if she remains with significant thrombocytopenia. Still should move forward with thoracentesis if patient will allow for symptomatic relief. Cardiac enzymes are elevated with known underlying coronary artery disease without any significant change. Echocardiogram will assess for any new regional wall motion abnormalities and medication therapy will be optimized.   Chronic morbidities laboratory values and vital signs are being monitored and addressed accordingly. Continue current therapy. See orders for further plan of care. Discharge planning is underway with likely disposition to be nursing home under the care of hospice. More than 50% of my  time was spent at the bedside counseling/coordinating care with the patient and/or family with face to face contact. This time was spent reviewing notes and laboratory data as well as instructing and counseling the patient. Time I spent with the family or surrogate(s) is included only if the patient was incapable of providing the necessary information or participating in medical decisions. I also discussed the differential diagnosis and all of the proposed management plans with the patient and individuals accompanying the patient. Chiquita requires this high level of physician care and nursing on the IMC/Telemetry unit due the complexity of decision management and chance of rapid decline or death. Continued cardiac monitoring and higher level of nursing are required. I am readily available for any further decision-making and intervention.      NILTON Maradiaga - CNP  4/25/2022  7:48 AM

## 2022-04-25 NOTE — PROGRESS NOTES
Patient came down to Special Procedures for ultrasound guided right thoracentesis. Procedure was explained, questions were answered. 1623 Starting procedure /57 104 24 98% on 3 liters nasal canula, sitting on side of bed    1633 Ending procedure /71 95 20 98% on 3 liters nasal canula, sitting on side of bed    1300cc of bloody color pleural fluid drained from patient, folded 4 x 4 and tegaderm applied to right back. Patient tolerated procedure    Post procedure chest xray taken    Specimen sent to lab with printed orders from ordering physician    Nurse to nurse called spoke with Harley Barton RN    Patient sent back to floor.

## 2022-04-26 VITALS
HEART RATE: 76 BPM | HEIGHT: 66 IN | DIASTOLIC BLOOD PRESSURE: 58 MMHG | OXYGEN SATURATION: 98 % | SYSTOLIC BLOOD PRESSURE: 124 MMHG | BODY MASS INDEX: 21.21 KG/M2 | RESPIRATION RATE: 20 BRPM | WEIGHT: 132 LBS | TEMPERATURE: 97.7 F

## 2022-04-26 LAB
ALBUMIN SERPL-MCNC: 2.9 G/DL (ref 3.5–5.2)
ALP BLD-CCNC: 94 U/L (ref 35–104)
ALT SERPL-CCNC: 21 U/L (ref 0–32)
ANION GAP SERPL CALCULATED.3IONS-SCNC: 7 MMOL/L (ref 7–16)
ANISOCYTOSIS: ABNORMAL
APPEARANCE FLUID: NORMAL
AST SERPL-CCNC: 27 U/L (ref 0–31)
BASOPHILS ABSOLUTE: 0.01 E9/L (ref 0–0.2)
BASOPHILS RELATIVE PERCENT: 0.5 % (ref 0–2)
BILIRUB SERPL-MCNC: 0.3 MG/DL (ref 0–1.2)
BUN BLDV-MCNC: 23 MG/DL (ref 6–23)
CALCIUM SERPL-MCNC: 9.5 MG/DL (ref 8.6–10.2)
CELL COUNT FLUID TYPE: NORMAL
CHLORIDE BLD-SCNC: 106 MMOL/L (ref 98–107)
CO2: 25 MMOL/L (ref 22–29)
COLOR FLUID: NORMAL
CREAT SERPL-MCNC: 1 MG/DL (ref 0.5–1)
EOSINOPHILS ABSOLUTE: 0.01 E9/L (ref 0.05–0.5)
EOSINOPHILS RELATIVE PERCENT: 0.5 % (ref 0–6)
GFR AFRICAN AMERICAN: >60
GFR NON-AFRICAN AMERICAN: 53 ML/MIN/1.73
GLUCOSE BLD-MCNC: 85 MG/DL (ref 74–99)
HCT VFR BLD CALC: 26.6 % (ref 34–48)
HEMOGLOBIN: 8.1 G/DL (ref 11.5–15.5)
IMMATURE GRANULOCYTES #: 0.01 E9/L
IMMATURE GRANULOCYTES %: 0.5 % (ref 0–5)
LYMPHOCYTES ABSOLUTE: 0.67 E9/L (ref 1.5–4)
LYMPHOCYTES RELATIVE PERCENT: 31.3 % (ref 20–42)
MCH RBC QN AUTO: 24.7 PG (ref 26–35)
MCHC RBC AUTO-ENTMCNC: 30.5 % (ref 32–34.5)
MCV RBC AUTO: 81.1 FL (ref 80–99.9)
METER GLUCOSE: 103 MG/DL (ref 74–99)
METER GLUCOSE: 106 MG/DL (ref 74–99)
METER GLUCOSE: 143 MG/DL (ref 74–99)
METER GLUCOSE: 55 MG/DL (ref 74–99)
METER GLUCOSE: 72 MG/DL (ref 74–99)
METER GLUCOSE: 93 MG/DL (ref 74–99)
MONOCYTE, FLUID: 50 %
MONOCYTES ABSOLUTE: 0.18 E9/L (ref 0.1–0.95)
MONOCYTES RELATIVE PERCENT: 8.4 % (ref 2–12)
NEUTROPHIL, FLUID: 50 %
NEUTROPHILS ABSOLUTE: 1.26 E9/L (ref 1.8–7.3)
NEUTROPHILS RELATIVE PERCENT: 58.8 % (ref 43–80)
NUCLEATED CELLS FLUID: 799 /UL
ORGANISM: ABNORMAL
OVALOCYTES: ABNORMAL
PDW BLD-RTO: 17.6 FL (ref 11.5–15)
PLATELET # BLD: 16 E9/L (ref 130–450)
PLATELET CONFIRMATION: NORMAL
PMV BLD AUTO: ABNORMAL FL (ref 7–12)
POIKILOCYTES: ABNORMAL
POTASSIUM SERPL-SCNC: 4.2 MMOL/L (ref 3.5–5)
RBC # BLD: 3.28 E12/L (ref 3.5–5.5)
RBC FLUID: NORMAL /UL
SARS-COV-2, NAAT: NOT DETECTED
SODIUM BLD-SCNC: 138 MMOL/L (ref 132–146)
TOTAL PROTEIN: 6.1 G/DL (ref 6.4–8.3)
URINE CULTURE, ROUTINE: ABNORMAL
WBC # BLD: 2.1 E9/L (ref 4.5–11.5)

## 2022-04-26 PROCEDURE — 87635 SARS-COV-2 COVID-19 AMP PRB: CPT

## 2022-04-26 PROCEDURE — 80053 COMPREHEN METABOLIC PANEL: CPT

## 2022-04-26 PROCEDURE — 82962 GLUCOSE BLOOD TEST: CPT

## 2022-04-26 PROCEDURE — 85025 COMPLETE CBC W/AUTO DIFF WBC: CPT

## 2022-04-26 PROCEDURE — 36415 COLL VENOUS BLD VENIPUNCTURE: CPT

## 2022-04-26 RX ORDER — CEFDINIR 300 MG/1
300 CAPSULE ORAL 2 TIMES DAILY
Qty: 10 CAPSULE | Refills: 0 | DISCHARGE
Start: 2022-04-26 | End: 2022-05-01

## 2022-04-26 RX ORDER — LORAZEPAM 1 MG/1
1 TABLET ORAL EVERY 6 HOURS PRN
Qty: 20 TABLET | Refills: 0 | Status: SHIPPED | OUTPATIENT
Start: 2022-04-26 | End: 2022-05-01

## 2022-04-26 RX ORDER — OXYCODONE HYDROCHLORIDE 5 MG/1
2.5-5 TABLET ORAL EVERY 6 HOURS PRN
Qty: 20 TABLET | Refills: 0 | Status: SHIPPED | OUTPATIENT
Start: 2022-04-26 | End: 2022-05-01

## 2022-04-26 RX ORDER — POLYETHYLENE GLYCOL 3350 17 G/17G
17 POWDER, FOR SOLUTION ORAL DAILY PRN
Qty: 527 G | Refills: 1 | DISCHARGE
Start: 2022-04-26 | End: 2022-05-26

## 2022-04-26 RX ORDER — ONDANSETRON 4 MG/1
4 TABLET, ORALLY DISINTEGRATING ORAL EVERY 8 HOURS PRN
DISCHARGE
Start: 2022-04-26

## 2022-04-26 NOTE — PROGRESS NOTES
Physician Progress Note      Ilia Aviles  CSN #:                  [id number]  :                       1935  ADMIT DATE:       2022 4:47 PM  [address] [place] DATE:  RESPONDING  PROVIDER #:        Roel Daniel DO          QUERY TEXT:    Dear Attending Provider,    Patient admitted with Acute bilateral PE and DVT, likely due in part to   underlying diffusely malignant process. If possible, please document in the   progress notes and discharge summary if you are evaluating and/or treating any   of the following: The medical record reflects the following:  Risk Factors: Lung cancer with metastasis to the right hepatic lobe and to the   sacral spine, CKD 3b, COPD, DM, HTN, HLD, CAD  Clinical Indicators: per pn  and d/c summary: \"elevated troponin, type I   vs type II, underlying coronary artery disease and prior coronary artery   bypass grafting. Cardiac enzymes are elevated with known underlying coronary   artery disease without any significant change \"; troponin 43, 44  Treatment: telemetry monitoring,  lovenox    Thank You,  Peggi Heimlich RN, BSN, CDS  Clinical Documentation Improvement Specialist  [phone]  Options provided:  -- NSTEMI  -- Type 2 MI  -- Demand Ischemia with MI  -- Demand Ischemia only, no MI  -- Other - I will add my own diagnosis  -- Disagree - Not applicable / Not valid  -- Disagree - Clinically unable to determine / Unknown  -- Refer to Clinical Documentation Reviewer    PROVIDER RESPONSE TEXT:    This patient has an NSTEMI.     Query created by: Greta Hidalgo on 2022 12:45 PM      Electronically signed by:  Roel Daniel DO 2022 12:47 PM

## 2022-04-26 NOTE — ADT AUTH CERT
Utilization Reviews         Pulmonary Embolism - Care Day 3 (4/25/2022) by Santo Thomas RN       Review Status Review Entered   Completed 4/26/2022 10:14      Criteria Review      Care Day: 3 Care Date: 4/25/2022 Level of Care: Telemetry    Guideline Day 2    Level Of Care    (X) ICU or floor    4/26/2022 10:14 AM EDT by Gerard Markham      Telemetry    Clinical Status    (X) * Hemodynamic stability    4/26/2022 10:14 AM EDT by Gerard Markham      BP: 98/42,115/59,120/64  TN: 10,86,86    ( ) * Oxygenation stable or improved    4/26/2022 10:14 AM EDT by Gerard Markham      on 3L NC    ( ) * Dyspnea absent or improved    4/26/2022 10:14 AM EDT by Gerard Markham      + dyspnea    ( ) * Tachypnea absent or improved    4/26/2022 10:14 AM EDT by Gerard Markham      RR: 32,22,21 (tachypnea present)    ( ) * PTT in therapeutic range or not indicated    Activity    (X) * Limited ambulation    4/26/2022 10:14 AM EDT by Gerard Markham      limited ambulation    Routes    (X) Oral hydration    4/26/2022 10:14 AM EDT by Gerard Markham      po hydration    (X) Oral medications    4/26/2022 10:14 AM EDT by Gerard Markham      Buspar 5mg po bid  Celexa 20mg po qd  Lopressor 25mg po bid  Senokot 1 tab po bid    (X) Usual diet    4/26/2022 10:14 AM EDT by [facility], [address] [place] [address]; Breakfast, Lunch, Dinner; Diabetic Oral Supplement    Interventions    (X) Platelet count    2/12/0802 10:14 AM EDT by Gerard Markham      24( very low)    (X) Pulse oximetry    4/26/2022 10:14 AM EDT by Gerard Markham      continuous monitoring    (X) Possible cardiac monitoring    4/26/2022 10:14 AM EDT by Gerard Markham      continuous monitoring    * Milestone   Additional Notes   DATE: 4/25/22         Persistent Update:    ++ dyspnea and cough. Generally weak. Pt.would like to transition to the nursing home with hospice if possible.          Vitals:   BP: 98/42   TN: 92   RR: 32   T: 98.1   SpO2: 92% on 3L NC         Abnl/Pertinent Labs/Radiology/Diagnostic Studies:   BUN,BUNPL: 30   GLUCOSE, FASTING,GF: 71   Total Protein: 6.2   Meter Glucose: 155,177   Albumin: 3.2   WBC: 3.0   H/H: 8.8/29.0   Platelet Count: 24      XR CHEST INSPIRATION AND EXPIRATION:   No pneumothorax status post right thoracentesis. IR GUIDED THORACENTESIS PLEURAL:   Successful ultrasound guided right thoracentesis   -A total of 1300 mL of sanguinous fluid was removed         Physical Exam:   Lungs:     Symmetrical.  Pattern is mildly tachypneic with some accessory muscle usage.  Diminished right greater than the left.  No wheezing rales or rhonchi appreciated         MD Consults/Assessments & Plans:   IM MD:   1. Acute bilateral PE, likely due in part to underlying diffusely malignant process   2. Sepsis (POA) secondary to urinary tract infection    3. Lung cancer with metastasis to the right hepatic lobe and to the sacral spine with what is likely a malignant large right pleural effusion pending formal fluid analysis   4. Elevated troponin, type I vs type II, underlying coronary artery disease and prior coronary artery bypass grafting    5. Pancytopenia, likely due to chemotherapy   6. Chronic kidney disease stage IIIb   7. COPD without excerbation   8. Insulin-dependent diabetes mellitus   9. Hyperlipidemia   10. Hypertension       Plan:    Alexandra Rash is uncomfortable.  After extensive discussion with our service as well as oncology/hematology and palliative care, she seems to be leaning towards hospice. [facility] will provide consultation today. Parker Shepherd will add morphine for pain and air hunger/dyspnea symptoms. Dm Posey is declining essentially all aggressive treatment.  Labs from today are pending to evaluate the degree of thrombocytopenia as we may need to make a decision regarding anticoagulation if she remains with significant thrombocytopenia.  Still should move forward with thoracentesis if patient will allow for symptomatic relief.  Cardiac enzymes are elevated with known underlying coronary artery disease without any significant change.  Echocardiogram will assess for any new regional wall motion abnormalities and medication therapy will be optimized.  Chronic morbidities laboratory values and vital signs are being monitored and addressed accordingly.  Continue current therapy.  See orders for further plan of care.  Discharge planning is underway with likely disposition to be nursing home under the care of hospice.              Medications:   cefTRIAXone (ROCEPHIN) 1,000 mg in sterile water 10 mL IV syringe   Dose: 1,000 mg   Freq: EVERY 24 HOURS Route: IV      insulin glargine (LANTUS) injection vial 20 Units   Dose: 20 Units   Freq: NIGHTLY Route: SC      insulin lispro (HUMALOG) injection vial 0-3 Units   Dose: 0-3 Units   Freq: NIGHTLY Route: SC      pantoprazole (PROTONIX) injection 40 mg   Dose: 40 mg   Freq: 2 TIMES DAILY Route: IV      morphine (PF) injection 1 mg   Dose: 1 mg   Freq: EVERY 2 HOURS PRN Route: IV x1      morphine (PF) injection 2 mg   Dose: 2 mg   Freq: EVERY 2 HOURS PRN Route: IV x1         Orders:   Cytology, Non-Gyn,continue above meds,POCT Glucose,Cell Count with Differential, Body Fluid,Lactate Dehydrogenase, Body Fluid,Protein, Body Fluid,Culture, Fungus,Gram Stain,XR CHEST INSPIRATION AND EXPIRATION,IR GUIDED THORACENTESIS PLEURAL,ADULT ORAL NUTRITION SUPPLEMENT; Breakfast, Lunch, Dinner; Diabetic Oral Supplement,Platelet Confirmation                 Pulmonary Embolism - Care Day 2 (4/24/2022) by Jacob Fitzgerald RN       Review Status Review Entered   Completed 4/26/2022 09:18      Criteria Review      Care Day: 2 Care Date: 4/24/2022 Level of Care: Telemetry    Guideline Day 2    Level Of Care    (X) ICU or floor    Clinical Status    (X) * Hemodynamic stability    (X) * Oxygenation stable or improved    (X) * Dyspnea absent or improved    ( ) * Tachypnea absent or improved    4/26/2022 9:18 AM EDT by Anibal Jung      resp rate 22, 28, 36, 25, 20 (X) * PTT in therapeutic range or not indicated    Activity    (X) * Limited ambulation    Routes    (X) Oral hydration    (X) Oral medications    (X) Usual diet    Interventions    (X) Platelet count    8/04/3301 9:18 AM EDT by Radha Comer      31    (X) Pulse oximetry    4/26/2022 9:18 AM EDT by Radha Comer      93% 2l/nc    (X) Possible cardiac monitoring    4/26/2022 9:18 AM EDT by Radha Comer      tele    Medications    (X) Anticoagulants    4/26/2022 9:18 AM EDT by Radha Comer      lovenox    * Milestone   Additional Notes   DATE:    4/24      Pertinent Updates:   She is identified as having PE.  She was also noted to have a right-sided pleural effusion and possible underlying pneumonia.  This is further complicated by her lung cancer with metastasis to the bone and liver.  We have discussed this with her at bedside and the plan of care moving forward.  She admits that she no longer wants to receive chemotherapy and she is unsure of how aggressive she would like to be moving forward.          Vitals:   97.9 (36.6) 28 87 105/56 - - - 2 95 Nasal cannula          Abnl/Pertinent Labs/Radiology/Diagnostic Studies:   Wbc 3.2, hgb 9.1, hct 29.4, plt 31, glucose 120, procalcitonin 0.16,       Urinalysis with Microscopic [[id number]] (Abnormal) Collected: 04/24/22 0300     Specimen: Urine, clean catch Updated: 04/24/22 0318     Color, UA Yellow     Clarity, UA CLOUDY     Glucose, Ur >=1000 mg/dL      Bilirubin Urine Negative     Ketones, Urine Negative mg/dL      Specific Gravity, UA 1.010     Blood, Urine MODERATE     pH, UA 5.0     Protein, UA Negative mg/dL      Urobilinogen, Urine 0.2 E.U./dL      Nitrite, Urine Negative     Leukocyte Esterase, Urine MODERATE     WBC, UA >20 /HPF      RBC, UA 2-5 /HPF      Epithelial Cells, UA RARE /HPF      Bacteria, UA RARE /HPF      Yeast, UA Present /HPF          BLE venous doppler   Impression:        Bilateral extensive DVT with predominant occlusive thrombus as above    commented. .. Physical Exam:   Heart:     Rhythm regular. Rate controlled.  S1 and S2.  Systolic murmur. Lungs:     Symmetrical.  Pattern is mildly tachypneic with some accessory muscle usage.  Diminished right greater than the left.  No wheezing rales or rhonchi appreciated   Extremities:     Peripheral pulses present.  No significant peripheral edema.  No ulcers. No cyanosis. No clubbing. MD Consults/Assessments & Plans:   IM note   Assessment:   1. Acute bilateral PE, likely due in part to underlying diffusely malignant process   2. Sepsis (POA) secondary to urinary tract infection    3. Lung cancer with metastasis to the right hepatic lobe and to the sacral spine with what is likely a malignant large right pleural effusion pending formal fluid analysis   4. Elevated troponin, type I vs type II, underlying coronary artery disease and prior coronary artery bypass grafting    5. Pancytopenia, likely due to chemotherapy   6. Chronic kidney disease stage IIIb   7. COPD without excerbation   8. Insulin-dependent diabetes mellitus   9. Hyperlipidemia   10.  Hypertension               Plan:    Jb Rubio is an 50-year-old female patient who presented to [address] [place] [address] emergency department shortness of breath and nausea.  Extensive work-up was undertaken and multiple issues were placed.  She was found to have acute bilateral PE with known underlying lung cancer and diffusely metastatic process. Erlinda Washburn was started on Lovenox.  Echocardiogram will be obtained to assess for right ventricular strain.  Injectable blood thinner medications will be utilized until all invasive procedures have been completed as we anticipate the need for thoracentesis of what is likely a malignant pleural effusion on the right side.  Full fluid analysis will be undertaken and, when clinically appropriate, the patient be transition to oral blood thinning medications.  She admits that she is no longer interested in receiving chemotherapy for her lung cancer we will ask the oncology/hematology team to follow for their input and further discussion with the patient.  Palliative care will also be consulted as well as the patient may be more appropriate for comfort measures.  Cardiac enzymes are elevated with known underlying coronary artery disease without any significant change.  Echocardiogram will assess for any new regional wall motion abnormalities and medication therapy will be optimized.  Chronic morbidities laboratory values and vital signs are being monitored and addressed accordingly. Continue current therapy.  See orders for further plan of care. Oncology note   A/P: 81year old female known to Dr. 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 She had her first cycle of carbo-alimta-keytruda 0-69-37 complicated by pancytopenia and admitted with bilateral PE and known prior right leg DVT.       1. PE/ right legDVT- she is s/p IVC filter in February. She was not anticoagulated at that time due to GI bleed with Hb of 5.8 and with EGD/colonoscopy not identifying source. She now has thrombocytopenia from chemotherapy so lovenox has only been ordered once a day. I will give platelet transfusion today with goal of platelets 31,257 or greater. 2. Stage IV lung cancer- patient does not want any further chemotherapy. She does not want intubated/cardioverted/etc and agrees to Analisa. I recommended patient speak with hospice. She currently lives in [address] [place] [address] and will not be able to remain there and care for herself. She mentioned that she may want to try for placement at [facility]. 3. Pancytopenia due to chemotherapy will improve with time. CHeck platelet count later today after transfusion and if less than 50,000 then should order 1 more unit of platelets if going to remain on lovenox.    4. Past hx GI bleed Feb 2022 without source found on EGD/colonoscopy. Hb was 12 last week, watch for any signs of GI bleeding             Medications:   Scheduled Meds:pantoprazole, 40 mg, IntraVENous, BID   insulin lispro, 0-6 Units, SubCUTAneous, TID WC   insulin lispro, 0-3 Units, SubCUTAneous, Nightly   cefTRIAXone (ROCEPHIN) IV, 1,000 mg, IntraVENous, Q24H   enoxaparin, 1 mg/kg, SubCUTAneous, Nightly   ondansetron, 4 mg, IntraVENous, Once   sodium chloride flush, 5-40 mL, IntraVENous, 2 times per day   busPIRone, 5 mg, Oral, BID   citalopram, 20 mg, Oral, Daily   insulin glargine, 20 Units, SubCUTAneous, Nightly   metoprolol tartrate, 25 mg, Oral, BID   sennosides-docusate sodium, 1 tablet, Oral, BID   Albumin 25g iv x1      PRN Meds given: morphine       Orders:maintain hob, up with assist, tele, O2, dysphagia diet         PT/OT/SLP/CM Assessments or Notes:   CM note   -24-Cm note: ( covid neg 4-23) met with pt at the bedside pt is alert and oriented, she is here from [address] [place] [address], [address] [place] [address], she doesn't feel like she will be strong enough to return at [facility] , pt asked for a referral to [address] [place] [address], however they are not in network with her Clayton Aguilar, CM left a list of facilities in network, she will look over them and get back with CM/SS for decision.  will need PT/OT evaluations , Precert for SNF required

## 2022-04-26 NOTE — FLOWSHEET NOTE
Inpatient Wound Care    Admit Date: 4/23/2022  4:47 PM    Reason for consult:  Left buttocks    Significant history:    Past Medical History:   Diagnosis Date    CAD (coronary artery disease)     Cancer ([facility]     Lung    COPD (chronic obstructive pulmonary disease) ([facility]     early stage    Diabetes mellitus ([facility]     Hyperlipidemia     Hypertension        Wound history:      Findings:       04/26/22 1041   Wound 04/24/22 Buttocks Left;Lateral   Date First Assessed/Time First Assessed: 04/24/22 0145   Present on Hospital Admission: Yes  Location: Buttocks  Wound Location Orientation: Left;Lateral   Wound Etiology Pressure Stage  2   Dressing/Treatment Foam   Wound Length (cm) 1.4 cm   Wound Width (cm) 1.4 cm   Wound Depth (cm) 0.2 cm   Wound Surface Area (cm^2) 1.96 cm^2   Change in Wound Size % (l*w) -292   Wound Volume (cm^3) 0.392 cm^3   Wound Healing % -292   Wound Assessment Pink/red   Drainage Amount None   Naty-wound Assessment Intact   Wound 04/24/22 Buttocks Left;Medial   Date First Assessed/Time First Assessed: 04/24/22 0145   Present on Hospital Admission: Yes  Location: Buttocks  Wound Location Orientation: Left;Medial   Dressing/Treatment Foam       Impression:  Stage 2 pressure injury    Interventions in place:  Sacral mepilex    Plan:  Cont mepilex to sacral area      Melanie Quintana RN 4/26/2022 10:43 AM

## 2022-04-26 NOTE — CARE COORDINATION
Ss note:4/26/2022.10:14 AM Neg covid result today. Discharge order noted. Pt accepted at [facility] and bed is available today. Pt to transfer to facility today at 2:00 pm via [facility] and begin hospice services with [facility]. Facility liaison, nursing, [facility] and pts granddtr Letty aware of discharge destination and time.  CHIKA Talley

## 2022-04-27 LAB — GRAM STAIN ORDERABLE: NORMAL

## 2022-04-28 LAB
BODY FLUID CULTURE, STERILE: NORMAL
GRAM STAIN RESULT: NORMAL

## 2022-04-29 LAB
BLOOD CULTURE, ROUTINE: NORMAL
CULTURE, BLOOD 2: NORMAL

## 2023-01-01 NOTE — PLAN OF CARE
Problem: Nutrition Deficit:  Goal: Optimize nutritional status  Outcome: Progressing
Problem: Pain  Goal: Verbalizes/displays adequate comfort level or baseline comfort level  4/24/2022 1902 by Taylor Magana RN  Outcome: Not Progressing  4/24/2022 0510 by Willy Mueller RN  Outcome: Progressing     Problem: Safety - Adult  Goal: Free from fall injury  4/24/2022 1902 by Taylor Magana RN  Outcome: Not Progressing  4/24/2022 0510 by Willy Mueller RN  Outcome: Progressing     Problem: ABCDS Injury Assessment  Goal: Absence of physical injury  4/24/2022 1902 by Taylor Magana RN  Outcome: Not Progressing  4/24/2022 0510 by Willy Mueller RN  Outcome: Progressing     Problem: Skin/Tissue Integrity  Goal: Absence of new skin breakdown  Description: 1. Monitor for areas of redness and/or skin breakdown  2. Assess vascular access sites hourly  3. Every 4-6 hours minimum:  Change oxygen saturation probe site  4. Every 4-6 hours:  If on nasal continuous positive airway pressure, respiratory therapy assess nares and determine need for appliance change or resting period.   4/24/2022 1902 by Taylor Magana RN  Outcome: Not Progressing  4/24/2022 0510 by Willy Mueller RN  Outcome: Progressing
Problem: Pain  Goal: Verbalizes/displays adequate comfort level or baseline comfort level  Outcome: Progressing     Problem: Safety - Adult  Goal: Free from fall injury  Outcome: Progressing     Problem: ABCDS Injury Assessment  Goal: Absence of physical injury  Outcome: Progressing     Problem: Skin/Tissue Integrity  Goal: Absence of new skin breakdown  Description: 1. Monitor for areas of redness and/or skin breakdown  2. Assess vascular access sites hourly  3. Every 4-6 hours minimum:  Change oxygen saturation probe site  4. Every 4-6 hours:  If on nasal continuous positive airway pressure, respiratory therapy assess nares and determine need for appliance change or resting period.   Outcome: Progressing
3.395

## 2023-04-20 NOTE — PROGRESS NOTES
Assessment and Plan  Patient is a 81 y.o. female with the following medical Problems:   1. Upper GI bleeding secondary to duodenal ulcer and esophagitis  2. Hypovolemic shock (resolved)  3. Metabolic encephalopathy (improving)  4. Acute kidney injury (improving)  5. Recent cervical fracture  6. Infrarenal aneurysm  7. Type 2 diabetes  8. Delirium  9. New onset atrial fibrillation. Plan of care:  1. Transfuse to keep hemoglobin above 7  2. Patient had an EGD which showed a duodenal ulcer that was treated with epinephrine  3.  UA and urine culture. UA is suggestive of UTI but urine culture is pending. 4.  We will discontinue the Gonzalez catheter and the central line and transfer the patient to a telemetry floor  5. Lovenox DVT prophylaxis. GI team is okay with DVT prophylaxis. 6.  Speech therapy evaluation and advance diet accordingly  7. Patient was started on low-dose Seroquel for agitation. 8.  Patient is started on metoprolol 25 mg twice daily for atrial fibrillation with RVR. Patient is a candidate for long-term anticoagulation however, patient had a recent upper GI bleeding in the duodenal ulcer. We will hold off full anticoagulation until patient is cleared by the GI team.    History of Present Illness:   Patient is a 52-year-old woman who was admitted on August 29, 2020 with melena and 2 g drop in hemoglobin. She was admitted to the ICU and underwent an EGD yesterday which showed duodenal bulb ulcer that was treated with epinephrine. Patient has been hemodynamically stable since then. She continues to be on and off confused. 09/01/2020  Patient had an episode of atrial fibrillation with RVR. She remained hemodynamically stable. She has no fever, chills, or rigors. She continues to be confused and agitated at times. She was started on Seroquel for agitation and metoprolol for atrial fibrillation with RVR this morning.     Past Medical History:  Past Medical History:   Diagnosis Get these labs prior to annual with PCP    Date    CAD (coronary artery disease)     COPD (chronic obstructive pulmonary disease) ([facility] [address]     early stage    [facility]     Hyperlipidemia     Hypertension         Family History:   History reviewed. No pertinent family history. Allergies:         Naproxen; Nsaids; and Vicodin [hydrocodone-acetaminophen]    Social history:  Social History     Socioeconomic History    Marital status:       Spouse name: Not on file    Number of children: Not on file    Years of education: Not on file    Highest education level: Not on file   Occupational History    Not on file   Social Needs    Financial resource strain: Not on file    Food insecurity     Worry: Not on file     Inability: Not on file    Transportation needs     Medical: Not on file     Non-medical: Not on file   Tobacco Use    Smoking status: Former Smoker     Years: 30.00     Last attempt to quit: 4/3/2013     Years since quittin.4    Smokeless tobacco: Never Used   Substance and Sexual Activity    Alcohol use: No    Drug use: Never    Sexual activity: Not Currently   Lifestyle    Physical activity     Days per week: Not on file     Minutes per session: Not on file    Stress: Not on file   Relationships    Social connections     Talks on phone: Not on file     Gets together: Not on file     Attends Yarsanism service: Not on file     Active member of club or organization: Not on file     Attends meetings of clubs or organizations: Not on file     Relationship status: Not on file    Intimate partner violence     Fear of current or ex partner: Not on file     Emotionally abused: Not on file     Physically abused: Not on file     Forced sexual activity: Not on file   Other Topics Concern    Not on file   Social History Narrative    Not on file       Current Medications:     Current Facility-Administered Medications:     [START ON 2020] pantoprazole (PROTONIX) tablet 40 mg, 40 mg, Oral, QAM AC, [place] Lien, DO    lactated ringers infusion, , Intravenous, Continuous, Krystin Ashley MD, Last Rate: 75 mL/hr at 09/01/20 1021    metoprolol tartrate (LOPRESSOR) tablet 25 mg, 25 mg, Oral, BID, Krystin Ashley MD, 25 mg at 09/01/20 1021    QUEtiapine (SEROQUEL) tablet 12.5 mg, 12.5 mg, Oral, BID, Krystin Ashley MD, 12.5 mg at 09/01/20 1021    enoxaparin (LOVENOX) injection 40 mg, 40 mg, Subcutaneous, Daily, Krystin Ashley MD, 40 mg at 09/01/20 1021    0.9 % sodium chloride bolus, 20 mL, Intravenous, Once, Quinton Pereira,     acetaminophen (TYLENOL) tablet 650 mg, 650 mg, Oral, Q6H PRN, Gonzalez Severino DO, 650 mg at 08/29/20 1055    glucose (GLUTOSE) 40 % oral gel 15 g, 15 g, Oral, PRN, NILTON Camarillo CNP    dextrose 50 % IV solution, 12.5 g, Intravenous, PRN, NILTON Camarillo CNP    glucagon (rDNA) injection 1 mg, 1 mg, Intramuscular, PRN, NILTON Camarillo CNP    dextrose 5 % solution, 100 mL/hr, Intravenous, PRN, NILTON Camarillo - CNP    insulin lispro (HUMALOG) injection vial 0-12 Units, 0-12 Units, Subcutaneous, TID WC, NILTON Camarillo CNP, 6 Units at 08/31/20 1223    insulin lispro (HUMALOG) injection vial 0-6 Units, 0-6 Units, Subcutaneous, Nightly, NILTON Camarillo CNP, 3 Units at 08/30/20 2011    magnesium sulfate 1 g in dextrose 5% 100 mL IVPB, 1 g, Intravenous, PRN, NILTON Camarillo CNP, Stopped at 08/31/20 1209    sodium phosphate 10.89 mmol in dextrose 5 % 250 mL IVPB, 0.16 mmol/kg, Intravenous, PRN, Stopped at 08/31/20 1209 **OR** sodium phosphate 21.75 mmol in dextrose 5 % 500 mL IVPB, 0.32 mmol/kg, Intravenous, PRN, NILTON Camarillo - CNP    potassium chloride 10 mEq/100 mL IVPB (Peripheral Line), 10 mEq, Intravenous, PRN, NILTON Camarillo CNP    insulin glargine (LANTUS) injection vial 20 Units, 20 Units, Subcutaneous, Nightly, NILTON Camarillo CNP, 20 Units at 08/31/20 4671    perflutren lipid microspheres (DEFINITY) injection 1.65 mg, 1.5 mL, Intravenous, ONCE PRN, Cruzito Reynoso, APRN - CNP    sodium chloride flush 0.9 % injection 10 mL, 10 mL, Intravenous, PRN, Memo Ragland DO    heparin flush 100 UNIT/ML injection 300 Units, 3 mL, Intravenous, 2 times per day, Memo Ragland DO, 300 Units at 08/31/20 2141    heparin flush 100 UNIT/ML injection 300 Units, 3 mL, Intracatheter, PRN, Memo Ragland DO    fentaNYL (SUBLIMAZE) injection 25 mcg, 25 mcg, Intravenous, Q3H PRN, Memo Ragland DO, 25 mcg at 09/01/20 0745    Review of Systems:   General: denies weight gain, denies loss of appetite, fever, chills, night sweats. HEENT: denies headaches, dizziness, head trauma, visual changes, eye pain, tinnitus, nosebleeds, hoarseness or throat pain    Respiratory: denies chest pain, dyspnea, cough and hemoptysis  Cardiovascular: denies orthopnea, paroxysmal nocturnal dyspnea, leg swelling, and previous heart attack. Gastrointestinal: denies pain, nausea vomiting, diarrhea, constipation, melena or bleeding. Genitourinary: denies hematuria, frequency, urgency or dysuria  Neurology: denies syncope, seizures, paralysis, paraesthesia   Endocrine: denies polyuria, polydipsia, skin or hair changes, and heat or cold intolerance  Musculoskeletal: denies joint pain, swelling, arthritis or myalgia  Hematologic: denies bleeding, adenopathy and easy bruising  Skin: denies rashes and skin discoloration  Psychiatry: denies depression    Physical Exam:   Vital Signs:  /73   Pulse 105   Temp 98.3 °F (36.8 °C) (Oral)   Resp 18   Ht 5' 6\" (1.676 m)   Wt 149 lb 14.4 oz (68 kg)   SpO2 90%   BMI 24.19 kg/m²     Input/Output:   In: 1610.2 [I.V.:1160.2]  Out: -     Oxygen requirements: 3 NC    Ventilator Information:  Vent Information  SpO2: 90 %    General appearance: not in pain or distress, in no respiratory distress    HEENT: Atraumatic/normocephalic, EOMI, SARAH, pharynx clear, moist mucosa, redness of the uvula appreciated, Has a collar  Neck: Supple, no jugular venous distension, lymphadenopathy, thyromegaly or carotid bruits  Chest: Equal normal breath sounds, no wheezing, no crackles and no tenderness over ribs   Cardiovascular: Normal S1 , S2, regular rate and rhythm, no murmur, rub or gallop  Abdomen: Normal sounds present, soft, lax with no tenderness, no hepatosplenomegaly, and no masses  Extremities: No edema. Pulses are equally present. Skin: intact, no rashes   Neurologic: Alert and oriented x 3, No focal deficit     Investigations:  Labs, radiological imaging and cardiac work up were reviewed        ICU STAFF PHYSICIAN NOTE OF PERSONAL INVOLVEMENT IN CARE  As the attending physician, I certify that I personally reviewed the patient's history and personally examined the patient to confirm the physical findings described above, and that I reviewed the relevant imaging studies and available reports. I also discussed the differential diagnosis and all of the proposed management plans with the patient and individuals accompanying the patient to this visit. They had the opportunity to ask questions about the proposed management plans and to have those questions answered. This patient has a high probability of sudden, clinically significant deterioration, which requires the highest level of physician preparedness to intervene urgently. I managed/supervised life or organ supporting interventions that required frequent physician assessment. I devoted my full attention to the direct care of this patient for the amount of time indicated below. Time I spent with family or surrogate(s) is included only if the patient was incapable of providing the necessary information or participating in medical decision-making. Time devoted to teaching and to any procedures I billed separately is not included.   Critical Care Time: 35 minutes      Electronically signed by Parris Vanessa MD on 9/1/2020 at 1:52 PM

## 2023-10-24 NOTE — DISCHARGE SUMMARY
Internal Medicine Progress Note     [facility]     Olena Dominguez. Christopher Garces, RAMONITAOShannonIShannon Bhatti D.O., RAMONITAOShannonIShannon Lucio D.O. Zena Lopes, MSN, APRN, NP-C  Thea De Jesus. Cris Wasserman, MSN, APRN-CNP       Internal Medicine  Discharge Summary    NAME: Lorna Nielsen  :  1935  MRN:  [id number]  Minnie Ramana APRMILTON - CNP  ADMITTED: 2022      DISCHARGED: 22    ADMITTING PHYSICIAN: Olena Severino DO    CONSULTANT(S):   IP CONSULT TO SOCIAL WORK  IP CONSULT TO HEM/ONC  IP CONSULT TO PALLIATIVE CARE  IP CONSULT TO HOSPICE     ADMITTING DIAGNOSIS:   Bilateral pulmonary embolism ([facility] [address] [I26.99]     DISCHARGE DIAGNOSES:   1. Transition to comfort measures and hospice at the skilled nursing facility   2. Acute bilateral PE and DVT, likely due in part to underlying diffusely malignant process  3. Sepsis (POA) secondary to urinary tract infection   4. Lung cancer with metastasis to the right hepatic lobe and to the sacral spine  5. Large right pleural effusion that is exudative by lights criteria status post thoracentesis and removal of 1300 cc of fluid 2022  6. elevated troponin, type I vs type II, underlying coronary artery disease and prior coronary artery bypass grafting   7. Pancytopenia, likely due to chemotherapy  8. Chronic kidney disease stage IIIb  9. COPD without excerbation  10. Insulin-dependent diabetes mellitus  11. Hyperlipidemia  12. Hypertension      BRIEF HISTORY OF PRESENT ILLNESS:   Patient is 46-year-old female who presented to the ED from [address] [place] [address] due to shortness of breath. Patient has been short of breath for the last 2 to 3 days she has mostly nonproductive cough. As such she was sent to the ED for further evaluation. Patient does complain of nausea. Denies any abdominal pain or emesis. She does have bilateral lower extremity edema which has been present for the last month.   She denies any previous history of DVT or PE. She denies any fever or chills. She denies any chest pain. Patient recently diagnosed with lung cancer a few months ago. She had last had chemotherapy on Friday. LABS[de-identified]  Lab Results   Component Value Date    WBC 2.1 (L) 04/26/2022    HGB 8.1 (L) 04/26/2022    HCT 26.6 (L) 04/26/2022    PLT 16 (LL) 04/26/2022     04/26/2022    K 4.2 04/26/2022     04/26/2022    CREATININE 1.0 04/26/2022    BUN 23 04/26/2022    CO2 25 04/26/2022    GLUCOSE 85 04/26/2022    ALT 21 04/26/2022    AST 27 04/26/2022    INR 1.0 04/19/2022     Lab Results   Component Value Date    INR 1.0 04/19/2022    INR 1.1 08/30/2020    INR 1.0 01/17/2015    PROTIME 11.8 04/19/2022    PROTIME 12.1 08/30/2020    PROTIME 11.4 01/17/2015      Lab Results   Component Value Date    TSH 2.120 04/24/2022     Lab Results   Component Value Date    TRIG 263 (H) 08/30/2020    TRIG 223 (H) 05/09/2019    TRIG 360 (H) 01/17/2015     Lab Results   Component Value Date    HDL 18 08/30/2020    HDL 37 05/09/2019    HDL 31 01/17/2015     Lab Results   Component Value Date    LDLCALC 36 08/30/2020    LDLCALC 86 05/09/2019    LDLCALC 138 (H) 01/17/2015     Lab Results   Component Value Date    LABA1C 5.9 (H) 08/30/2020       IMAGING:  XR CHEST INSPIRATION AND EXPIRATION    Result Date: 4/25/2022  EXAMINATION: ONE XRAY VIEW OF THE CHEST IN INSPIRATION 4/25/2022 2:37 pm COMPARISON: Chest x-ray 04/23/2022 HISTORY: ORDERING SYSTEM PROVIDED HISTORY: Postprocedural pneumothorax TECHNOLOGIST PROVIDED HISTORY: Reason for exam:->right sided thoracentesis FINDINGS: No pneumothorax is identified status post right thoracentesis. There is significant residual right pleural fluid. The left lung and pleural space appear clear. Heart is normal in size. Left chest wall port is noted with the tip in the SVC. No pneumothorax status post right thoracentesis.      XR CHEST (2 VW)    Result Date: 4/19/2022  EXAMINATION: TWO XRAY VIEWS OF THE CHEST 4/19/2022 9:13 am COMPARISON: 3 September 2020 HISTORY: ORDERING SYSTEM PROVIDED HISTORY: fall TECHNOLOGIST PROVIDED HISTORY: Reason for exam:->fall FINDINGS: New right pleural effusion. Enlarging soft tissue lesion at the right paratracheal mediastinum which may relate to adenopathy. There is airspace disease on the right. Implanted central venous catheter on the left is present which terminates in the SVC. New right pleural effusion with airspace disease. Suspected developing right paratracheal adenopathy. XR HIP BILATERAL W AP PELVIS (2 VIEWS)    Result Date: 4/19/2022  EXAMINATION: ONE XRAY VIEW OF THE PELVIS AND TWO XRAY VIEWS OF EACH OF THE BILATERAL HIPS 4/19/2022 9:13 am COMPARISON: Pelvic CT 29 August 2020 HISTORY: ORDERING SYSTEM PROVIDED HISTORY: fall TECHNOLOGIST PROVIDED HISTORY: Reason for exam:->fall FINDINGS: Intact compression screw at the right femur. Intact gamma nail at the left femur. No evident acute fracture or dislocation at either hip or involving the pelvis. Normal soft tissues. Postsurgical changes at both femora with intact hardware. No acute fracture or dislocation at either hip or involving the pelvis. CT Head WO Contrast    Result Date: 4/19/2022  EXAMINATION: CT OF THE HEAD WITHOUT CONTRAST  4/19/2022 7:26 am TECHNIQUE: CT of the head was performed without the administration of intravenous contrast. Dose modulation, iterative reconstruction, and/or weight based adjustment of the mA/kV was utilized to reduce the radiation dose to as low as reasonably achievable. COMPARISON: 08/23/2020 HISTORY: ORDERING SYSTEM PROVIDED HISTORY: fall TECHNOLOGIST PROVIDED HISTORY: Reason for exam:->fall Has a \"code stroke\" or \"stroke alert\" been called? ->No Decision Support Exception - unselect if not a suspected or confirmed emergency medical condition->Emergency Medical Condition (MA) FINDINGS: BRAIN/VENTRICLES: There is no acute intracranial hemorrhage, mass effect or midline shift. No abnormal extra-axial fluid collection. The gray-white differentiation is maintained without evidence of an acute infarct. There is no evidence of hydrocephalus. The ventricles, cisterns and sulci are prominent consistent with atrophy. There is decreased attenuation within the periventricular white matter consistent with periventricular leukomalacia. ORBITS: The visualized portion of the orbits demonstrate no acute abnormality. SINUSES: The visualized paranasal sinuses and mastoid air cells demonstrate no acute abnormality. There is dense mucosal thickening seen within the right sphenoid sinus which is chronic. There is no adjacent bony erosion. SOFT TISSUES/SKULL:  No acute abnormality of the visualized skull or soft tissues. 1. There is no acute intracranial abnormality. Specifically, there is no intracranial hemorrhage. 2. Atrophy and periventricular leukomalacia, 3. Chronic dense mucosal thickening seen within the right sphenoid sinus. CT Cervical Spine WO Contrast    Result Date: 4/19/2022  EXAMINATION: CT OF THE CERVICAL SPINE WITHOUT CONTRAST 4/19/2022 7:26 am TECHNIQUE: CT of the cervical spine was performed without the administration of intravenous contrast. Multiplanar reformatted images are provided for review. Dose modulation, iterative reconstruction, and/or weight based adjustment of the mA/kV was utilized to reduce the radiation dose to as low as reasonably achievable. COMPARISON: None. HISTORY: ORDERING SYSTEM PROVIDED HISTORY: fall TECHNOLOGIST PROVIDED HISTORY: Reason for exam:->fall Decision Support Exception - unselect if not a suspected or confirmed emergency medical condition->Emergency Medical Condition (MA) FINDINGS: The ring of C1 is intact as is the dense. There is no compression fracture of the cervical spine. No jumped or perched facet is noted. Multilevel degenerative disc and degenerative joint disease is noted.  The prevertebral soft tissues are unremarkable. The airway is widely patent. Images through the lung apices demonstrate a chronic mass within the right upper lobe. There is a moderate size right pleural effusion. There is thickening of the perivascular bundles and I cannot exclude lymphangitic carcinomatosis. 1. There is no acute compression fracture or subluxation of the cervical spine. 2. Multilevel degenerative disc and degenerative joint disease. 3. Large irregular mass seen within the right lung apex measuring 3.5 by 2.4 cm. 4. Moderate size right pleural effusion 5. Thickening of the septa and vascular bundles worrisome for lymphangitic carcinomatosis. The appearance of the right upper lobe is not significant changed compared to prior PET scan of 03/21/2022. XR CHEST PORTABLE    Result Date: 4/23/2022  EXAMINATION: ONE XRAY VIEW OF THE CHEST 4/23/2022 5:24 pm COMPARISON: April 19, 2022 HISTORY: ORDERING SYSTEM PROVIDED HISTORY: shortnesss of breath TECHNOLOGIST PROVIDED HISTORY: Reason for exam:->shortnesss of breath FINDINGS: Left-sided MediPort is present. Opacities are present throughout the right lung. Opacities have increased in right lung base now silhouetting right heart border and right hemidiaphragm. No evidence of pneumothorax. Redemonstration of opacities throughout the right lung suggesting pneumonia or interstitial pulmonary edema. Opacities have increased in right lung base which could indicate increasing right pleural effusion. CTA PULMONARY W CONTRAST    Result Date: 4/23/2022  EXAMINATION: CTA OF THE CHEST 4/23/2022 7:29 pm TECHNIQUE: CTA of the chest was performed after the administration of intravenous contrast.  Multiplanar reformatted images are provided for review. MIP images are provided for review. Dose modulation, iterative reconstruction, and/or weight based adjustment of the mA/kV was utilized to reduce the radiation dose to as low as reasonably achievable.  COMPARISON: 08/23/2020 HISTORY: ORDERING SYSTEM PROVIDED HISTORY: r/out PE TECHNOLOGIST PROVIDED HISTORY: Reason for exam:->r/out PE Decision Support Exception - unselect if not a suspected or confirmed emergency medical condition->Emergency Medical Condition (MA) FINDINGS: Pulmonary Arteries: Pulmonary arteries are adequately opacified for evaluation. Nonocclusive thrombus involving pulmonary branch vessels to the left upper lobe, distal left main pulmonary artery, and pulmonary artery branch vessels to the right lower lobe. Mediastinum: No evidence of mediastinal lymphadenopathy. The heart and pericardium demonstrate no acute abnormality. The ascending aorta measures up to 3.9 cm just within the upper limits of normal. Lungs/pleura: The lungs are without acute process. No focal consolidation or pulmonary edema. Large right pleural effusion. No pneumothorax. 5.0 cm x 2.0 cm right upper lobe mass with probable adjacent lymphatic spread. Upper Abdomen: 5.0 cm x 4.5 cm lesion involving the posterior right lobe of the liver. Soft Tissues/Bones: No acute bone or soft tissue abnormality. Degenerative changes visualized thoracic spine. Nonocclusive thrombus involving pulmonary artery branch vessels to the left upper lobe, distal left main pulmonary artery and pulmonary artery branch vessels to the right lower lobe. 5.0 cm x 2.0 cm right upper lobe mass with probable adjacent lymphatic spread. Large right pleural effusion. 5.0 cm x 4.5 cm ill-defined mass involving the posterior right lobe of the liver consistent with metastatic disease. The ascending aorta measures up to 3.9 cm in stable compared to prior study. Findings discussed on 04/23/2022 with Dr. Garth Ackerman at 8:36 p.m.      IR GUIDED THORACENTESIS PLEURAL    Result Date: 4/25/2022  PROCEDURE: Malissa Ashish RIGHT THORACENTESIS 4/25/2022 HISTORY: ORDERING SYSTEM PROVIDED HISTORY: IR guided thoracentesis TECHNOLOGIST PROVIDED HISTORY: Reason for exam:->IR guided thoracentesis Which side should the procedure be performed?->Right TECHNIQUE: Informed consent was obtained after a detailed explanation of the procedure including risks, benefits, and alternatives. Universal protocol was performed. The right chest was prepped and draped in sterile fashion and local anesthesia was achieved with lidocaine. A 5 Chadian needle sheath was advanced under ultrasound guidance into pleural effusion and thoracentesis was performed. The patient tolerated the procedure well. FINDINGS: A total of 1300 mL of sanguinous fluid was removed. Successful ultrasound guided right thoracentesis. US DUP LOWER EXTREMITIES BILATERAL VENOUS    Result Date: 4/24/2022  EXAMINATION: DUPLEX VENOUS ULTRASOUND OF THE BILATERAL LOWER EXTREMITIES4/24/2022 7:45 am TECHNIQUE: Duplex ultrasound using B-mode/gray scaled imaging, Doppler spectral analysis and color flow Doppler was obtained of the deep venous structures of the lower bilateral extremities. COMPARISON: None. HISTORY: ORDERING SYSTEM PROVIDED HISTORY: Encounter for assessment for deep vein thrombosis (DVT) TECHNOLOGIST PROVIDED HISTORY: Reason for exam:->dvt evaluation What reading provider will be dictating this exam?->CRC FINDINGS: RIGHT LOWER EXTREMITY: Presence of occlusive DVT affecting the mid and distal segments of the right superficial femoral vein. There is no occlusive DVT affecting the proximal segment of the right superficial femoral vein. There is patency and compressibility without the vein thrombosis of the right popliteal vein, and of the deep veins in the right calf area. No evidence for thrombosis in the right external iliac vein. The there is patency of the junction between the great saphenous vein with the right common femoral vein. LEFT LOWER EXTREMITY: Presence of occlusive DVT affecting the superficial femoral vein from proximal to the distal segments into the popliteal vein and into the peroneal vein.   There is lack of compressibility and flow in these vessels. There is patency of the left common femoral vein and of the left external iliac vein without evidence for thrombus. Junction between the left great saphenous vein and the left common femoral vein are patent, without thrombus. In the left calf vein the left the posterior tibial vein has no thrombus. Bilateral extensive DVT with predominant occlusive thrombus as above commented. .. RECOMMENDATIONS: Unavailable         HOSPITAL COURSE:   Bard Viramontes is an 78-year-old female who was admitted the evening of April 23 secondary to increased shortness of breath. She was identified as having bilateral pulmonary embolism as well as large right pleural effusion. The embolism was felt to be secondary to the underlying malignant process of the lung with metastasis. He was also identified as having DVT the hematology/oncology team provided consultation the patient was started on Lovenox. Echo did not show any significant pulm hypertension or RV dilation or strain. Based on her hematologic profile, she was offered platelet transfusion and close observation while therapeutically anticoagulated versus transition to comfort measures and hospice care based upon her overall poor prognosis and the patient's refusal to continue any type of chemotherapy or treatment for the underlying cancer process. After extensive discussion as well as the risk versus benefit discussion by our service as well as hematology/oncology, the patient is opted for transition to comfort measures and hospice. She had therapeutic/diagnostic thoracentesis that was exudative by lights criteria and significantly improved respiratory symptoms. She was started on medication for pain and anxiety. Chronic comorbidities were well managed. She is acceptable for discharge to the skilled nursing facility today under the care of hospice.      BRIEF PHYSICAL EXAMINATION AND LABORATORIES ON DAY OF DISCHARGE:  VITALS:  BP (!) 124/58   Pulse 76   Temp 97.7 °F (36.5 °C) (Oral)   Resp 20   Ht 5' 6\" (1.676 m)   Wt 132 lb (59.9 kg)   SpO2 98%   BMI 21.31 kg/m²     HEENT:  PERRLA. EOMI. Sclera clear. Buccal mucosa moist.    Neck:  Supple. Trachea midline. No thyromegaly. No JVD. No bruits. Heart:  Rhythm regular, rate controlled. S1 and S2. Systolic murmur. Lungs:  Symmetrical.  Significant improved air exchange of the right lung fields. Otherwise, lungs are generally diminished but clear to auscultation bilaterally. Abdomen: Soft. Non-tender. Non-distended. Bowel sounds positive. No organomegaly or masses. No pain on palpation    Extremities:  Peripheral pulses present. No significant pitting peripheral edema. Compartments are soft nontender with intact distal neurovascular examination. No ulcers. Neurologic:  Alert x 3. Generally weak and deconditioned with focal deficit. Somewhat hard of hearing, otherwise cranial nerves grossly intact. Skin:  No petechia. No hemorrhage. No wounds. DISPOSITION:  The patient's condition is fair. At this time the patient is without objective evidence of an acute process requiring continuing hospitalization or inpatient management. They are stable for discharge with outpatient follow-up. I have spoken with the patient and discussed the results of the current hospitalization, in addition to providing specific details for the plan of care and counseling regarding the diagnosis and prognosis. The plan has been discussed in detail and they are aware of the specific conditions for emergent return, as well as the importance of follow-up. Their questions are answered at this time and they are agreeable with the plan for discharge to SNF with Hospice    DISCHARGE MEDICATIONS:   Current Discharge Medication List           Details   LORazepam (ATIVAN) 1 MG tablet Take 1 tablet by mouth every 6 hours as needed for Anxiety for up to 5 days.   Qty: 20 tablet, Refills: 0    Associated Diagnoses: Anxiety oxyCODONE (ROXICODONE) 5 MG immediate release tablet Take 0.5-1 tablets by mouth every 6 hours as needed for Pain for up to 5 days. Intended supply: 7 days. Take lowest dose possible to manage pain  Qty: 20 tablet, Refills: 0    Comments: Reduce doses taken as pain becomes manageable  Associated Diagnoses: Cancer-related pain      ondansetron (ZOFRAN-ODT) 4 MG disintegrating tablet Take 1 tablet by mouth every 8 hours as needed for Nausea or Vomiting      polyethylene glycol (GLYCOLAX) 17 g packet Take 17 g by mouth daily as needed for Constipation  Qty: 527 g, Refills: 1      cefdinir (OMNICEF) 300 MG capsule Take 1 capsule by mouth 2 times daily for 5 days  Qty: 10 capsule, Refills: 0              Details   metoprolol tartrate (LOPRESSOR) 25 MG tablet Take 1 tablet by mouth 2 times daily  Qty: 60 tablet, Refills: 3      pantoprazole (PROTONIX) 40 MG tablet Take 1 tablet by mouth 2 times daily (before meals)  Qty: 30 tablet, Refills: 3      busPIRone (BUSPAR) 5 MG tablet Take 5 mg by mouth 2 times daily      !! insulin lispro (HUMALOG) 100 UNIT/ML injection vial Inject 0-12 Units into the skin 3 times daily (with meals)  Qty: 1 vial, Refills: 3      !! insulin lispro (HUMALOG) 100 UNIT/ML injection vial Inject 0-6 Units into the skin nightly  Qty: 1 vial, Refills: 3      sennosides-docusate sodium (SENOKOT-S) 8.6-50 MG tablet Take 1 tablet by mouth 2 times daily  Qty:        acetaminophen (TYLENOL) 325 MG tablet Take 650 mg by mouth every 6 hours as needed for Pain      insulin glargine (LANTUS) 100 UNIT/ML injection vial Inject 20 Units into the skin nightly. Qty: 1 vial, Refills: 3      citalopram (CELEXA) 20 MG tablet Take 20 mg by mouth daily. !! - Potential duplicate medications found. Please discuss with provider. FOLLOW UP/INSTRUCTIONS:  · This patient is instructed to follow-up with her primary care physician.   · Patient is instructed to follow-up with the consults listed above as directed by them. · she is instructed to resume home medications and take new medications as indicated in the list above. · If the patient has a recurrence of symptoms, she is instructed to go to the ED. Preparing for this patient's discharge, including paperwork, orders, instructions, and meeting with patient did require > 40 minutes.     NILTON Palma CNP     4/26/2022  8:52 AM occasional use

## (undated) DEVICE — SPONGE GZ 4IN 4IN 4 PLY N WVN AVANT

## (undated) DEVICE — KIT BEDSIDE REVITAL OX 500ML

## (undated) DEVICE — BLOCK BITE 60FR CAREGUARD

## (undated) DEVICE — LUBRICANT SURG JELLY ST BACTER TUBE 4.25OZ

## (undated) DEVICE — MASK,FACE,MAXFLUIDPROTECT,SHIELD/ERLPS: Brand: MEDLINE

## (undated) DEVICE — Device: Brand: DEFENDO VALVE AND CONNECTOR KIT

## (undated) DEVICE — KENDALL 450 SERIES MONITORING FOAM ELECTRODE - RECTANGULAR SHAPE ( 3/PK): Brand: KENDALL

## (undated) DEVICE — NEEDLE SCLERO 25GA L240CM OD0.51MM ID0.24MM EXTN L4MM SHTH

## (undated) DEVICE — CONTAINER SPEC COLL 960ML POLYPR TRIANG GRAD INTAKE/OUTPUT

## (undated) DEVICE — TUBING, SUCTION, 1/4" X 10', STRAIGHT: Brand: MEDLINE

## (undated) DEVICE — YANKAUER,BULB TIP,W/O VENT,RIGID,STERILE: Brand: MEDLINE

## (undated) DEVICE — GOWN ISOLATN REG YEL M WT MULTIPLY SIDETIE LEV 2